# Patient Record
Sex: FEMALE | Race: WHITE | NOT HISPANIC OR LATINO | Employment: OTHER | URBAN - METROPOLITAN AREA
[De-identification: names, ages, dates, MRNs, and addresses within clinical notes are randomized per-mention and may not be internally consistent; named-entity substitution may affect disease eponyms.]

---

## 2017-05-18 ENCOUNTER — HISTORICAL (OUTPATIENT)
Dept: ADMINISTRATIVE | Facility: HOSPITAL | Age: 57
End: 2017-05-18

## 2017-05-25 ENCOUNTER — HISTORICAL (OUTPATIENT)
Dept: ADMINISTRATIVE | Facility: HOSPITAL | Age: 57
End: 2017-05-25

## 2017-05-25 LAB
CHOLEST SERPL-MCNC: 290 MG/DL (ref 0–200)
CHOLEST/HDLC SERPL: 5.9 {RATIO} (ref 0–4)
HDLC SERPL-MCNC: 49 MG/DL (ref 35–60)
LDLC SERPL CALC-MCNC: 190 MG/DL (ref 0–129)
TRIGL SERPL-MCNC: 256 MG/DL (ref 30–150)
TSH SERPL-ACNC: 6.12 MIU/ML (ref 0.36–3.74)
VLDLC SERPL CALC-MCNC: 51 MG/DL

## 2017-06-12 ENCOUNTER — HISTORICAL (OUTPATIENT)
Dept: RADIOLOGY | Facility: HOSPITAL | Age: 57
End: 2017-06-12

## 2017-06-19 ENCOUNTER — HISTORICAL (OUTPATIENT)
Dept: RADIOLOGY | Facility: HOSPITAL | Age: 57
End: 2017-06-19

## 2017-07-10 ENCOUNTER — HISTORICAL (OUTPATIENT)
Dept: ADMINISTRATIVE | Facility: HOSPITAL | Age: 57
End: 2017-07-10

## 2017-07-10 LAB — TSH SERPL-ACNC: 1.04 MIU/ML (ref 0.36–3.74)

## 2017-12-06 ENCOUNTER — HISTORICAL (OUTPATIENT)
Dept: ADMINISTRATIVE | Facility: HOSPITAL | Age: 57
End: 2017-12-06

## 2017-12-06 LAB
ABS NEUT (OLG): 1.07 X10(3)/MCL (ref 2.1–9.2)
ALBUMIN SERPL-MCNC: 4.1 GM/DL (ref 3.4–5)
ALBUMIN/GLOB SERPL: 1.5 {RATIO}
ALP SERPL-CCNC: 65 UNIT/L (ref 38–126)
ALT SERPL-CCNC: 39 UNIT/L (ref 12–78)
ANISOCYTOSIS BLD QL SMEAR: 1
APPEARANCE, UA: CLEAR
AST SERPL-CCNC: 16 UNIT/L (ref 15–37)
BACTERIA SPEC CULT: ABNORMAL /HPF
BILIRUB SERPL-MCNC: 1.1 MG/DL (ref 0.2–1)
BILIRUB UR QL STRIP: NEGATIVE
BILIRUBIN DIRECT+TOT PNL SERPL-MCNC: 0.2 MG/DL (ref 0–0.2)
BILIRUBIN DIRECT+TOT PNL SERPL-MCNC: 0.9 MG/DL (ref 0–0.8)
BUN SERPL-MCNC: 19 MG/DL (ref 7–18)
CALCIUM SERPL-MCNC: 8.8 MG/DL (ref 8.5–10.1)
CHLORIDE SERPL-SCNC: 106 MMOL/L (ref 98–107)
CHOLEST SERPL-MCNC: 201 MG/DL (ref 0–200)
CHOLEST/HDLC SERPL: 4.4 {RATIO} (ref 0–4)
CO2 SERPL-SCNC: 27 MMOL/L (ref 21–32)
COLOR UR: YELLOW
CREAT SERPL-MCNC: 0.68 MG/DL (ref 0.55–1.02)
DEPRECATED CALCIDIOL+CALCIFEROL SERPL-MC: 35.2 NG/ML (ref 30–80)
EOSINOPHIL NFR BLD MANUAL: 6 % (ref 0–8)
ERYTHROCYTE [DISTWIDTH] IN BLOOD BY AUTOMATED COUNT: 12.9 % (ref 11.5–17)
EST. AVERAGE GLUCOSE BLD GHB EST-MCNC: 105 MG/DL
GLOBULIN SER-MCNC: 2.8 GM/DL (ref 2.4–3.5)
GLUCOSE (UA): NEGATIVE
GLUCOSE SERPL-MCNC: 94 MG/DL (ref 74–106)
HBA1C MFR BLD: 5.3 % (ref 4.2–6.3)
HCT VFR BLD AUTO: 41.4 % (ref 37–47)
HDLC SERPL-MCNC: 46 MG/DL (ref 35–60)
HGB BLD-MCNC: 14.2 GM/DL (ref 12–16)
HGB UR QL STRIP: NEGATIVE
KETONES UR QL STRIP: ABNORMAL
LDLC SERPL CALC-MCNC: 136 MG/DL (ref 0–129)
LEUKOCYTE ESTERASE UR QL STRIP: ABNORMAL
LYMPHOCYTES NFR BLD MANUAL: 41 % (ref 13–40)
LYMPHOCYTES NFR BLD MANUAL: 6 %
MCH RBC QN AUTO: 32.1 PG (ref 27–31)
MCHC RBC AUTO-ENTMCNC: 34.3 GM/DL (ref 33–36)
MCV RBC AUTO: 93.7 FL (ref 80–94)
MICROCYTES BLD QL SMEAR: 1
MONOCYTES NFR BLD MANUAL: 9 % (ref 2–11)
NEUTROPHILS NFR BLD MANUAL: 38 % (ref 47–80)
NITRITE UR QL STRIP: NEGATIVE
PH UR STRIP: 6 [PH] (ref 5–9)
PLATELET # BLD AUTO: 144 X10(3)/MCL (ref 130–400)
PLATELET # BLD EST: NORMAL 10*3/UL
PMV BLD AUTO: 10.3 FL (ref 7.4–10.4)
POTASSIUM SERPL-SCNC: 3.8 MMOL/L (ref 3.5–5.1)
PROT SERPL-MCNC: 6.9 GM/DL (ref 6.4–8.2)
PROT UR QL STRIP: NEGATIVE
RBC # BLD AUTO: 4.42 X10(6)/MCL (ref 4.2–5.4)
RBC #/AREA URNS HPF: ABNORMAL /[HPF]
SODIUM SERPL-SCNC: 142 MMOL/L (ref 136–145)
SP GR UR STRIP: 1.02 (ref 1–1.03)
SQUAMOUS EPITHELIAL, UA: 6 /HPF (ref 0–4)
TRIGL SERPL-MCNC: 93 MG/DL (ref 30–150)
TSH SERPL-ACNC: 2.41 MIU/ML (ref 0.36–3.74)
UROBILINOGEN UR STRIP-ACNC: 1
VLDLC SERPL CALC-MCNC: 19 MG/DL
WBC # SPEC AUTO: 2.6 X10(3)/MCL (ref 4.5–11.5)
WBC #/AREA URNS HPF: ABNORMAL /[HPF]

## 2018-02-28 ENCOUNTER — HISTORICAL (OUTPATIENT)
Dept: ADMINISTRATIVE | Facility: HOSPITAL | Age: 58
End: 2018-02-28

## 2018-02-28 LAB
ABS NEUT (OLG): 1.66 X10(3)/MCL (ref 2.1–9.2)
ANISOCYTOSIS BLD QL SMEAR: 1
APPEARANCE, UA: ABNORMAL
BACTERIA SPEC CULT: ABNORMAL /HPF
BASOPHILS NFR BLD MANUAL: 1 % (ref 0–2)
BILIRUB UR QL STRIP: NEGATIVE
COLOR UR: YELLOW
EOSINOPHIL NFR BLD MANUAL: 1 % (ref 0–8)
ERYTHROCYTE [DISTWIDTH] IN BLOOD BY AUTOMATED COUNT: 13.4 % (ref 11.5–17)
GLUCOSE (UA): NEGATIVE
HCT VFR BLD AUTO: 39.7 % (ref 37–47)
HGB BLD-MCNC: 13.4 GM/DL (ref 12–16)
HGB UR QL STRIP: NEGATIVE
KETONES UR QL STRIP: ABNORMAL
LEUKOCYTE ESTERASE UR QL STRIP: ABNORMAL
LYMPHOCYTES NFR BLD MANUAL: 46 % (ref 13–40)
MCH RBC QN AUTO: 31.9 PG (ref 27–31)
MCHC RBC AUTO-ENTMCNC: 33.8 GM/DL (ref 33–36)
MCV RBC AUTO: 94.5 FL (ref 80–94)
MICROCYTES BLD QL SMEAR: 1
MONOCYTES NFR BLD MANUAL: 4 % (ref 2–11)
NEUTROPHILS NFR BLD MANUAL: 48 % (ref 47–80)
NITRITE UR QL STRIP: NEGATIVE
PH UR STRIP: 5.5 [PH] (ref 5–9)
PLATELET # BLD AUTO: 143 X10(3)/MCL (ref 130–400)
PLATELET # BLD EST: NORMAL 10*3/UL
PMV BLD AUTO: 10.6 FL (ref 7.4–10.4)
PROT UR QL STRIP: NEGATIVE
RBC # BLD AUTO: 4.2 X10(6)/MCL (ref 4.2–5.4)
RBC #/AREA URNS HPF: ABNORMAL /[HPF]
SP GR UR STRIP: 1.01 (ref 1–1.03)
SQUAMOUS EPITHELIAL, UA: ABNORMAL
UA WBC MAN: ABNORMAL /HPF
UROBILINOGEN UR STRIP-ACNC: 1
WBC # SPEC AUTO: 3.9 X10(3)/MCL (ref 4.5–11.5)

## 2018-03-08 ENCOUNTER — HISTORICAL (OUTPATIENT)
Dept: ADMINISTRATIVE | Facility: HOSPITAL | Age: 58
End: 2018-03-08

## 2018-03-08 LAB — VIT B12 SERPL-MCNC: 455 PG/ML (ref 193–986)

## 2018-05-30 ENCOUNTER — HISTORICAL (OUTPATIENT)
Dept: ADMINISTRATIVE | Facility: HOSPITAL | Age: 58
End: 2018-05-30

## 2018-05-30 LAB
ABS NEUT (OLG): 3.73 X10(3)/MCL (ref 2.1–9.2)
BASOPHILS # BLD AUTO: 0 X10(3)/MCL (ref 0–0.2)
BASOPHILS NFR BLD AUTO: 0 %
EOSINOPHIL # BLD AUTO: 0.1 X10(3)/MCL (ref 0–0.9)
EOSINOPHIL NFR BLD AUTO: 2 %
ERYTHROCYTE [DISTWIDTH] IN BLOOD BY AUTOMATED COUNT: 12.5 % (ref 11.5–17)
HCT VFR BLD AUTO: 38.9 % (ref 37–47)
HGB BLD-MCNC: 12.7 GM/DL (ref 12–16)
LYMPHOCYTES # BLD AUTO: 2 X10(3)/MCL (ref 0.6–4.6)
LYMPHOCYTES NFR BLD AUTO: 33 %
MCH RBC QN AUTO: 31.8 PG (ref 27–31)
MCHC RBC AUTO-ENTMCNC: 32.6 GM/DL (ref 33–36)
MCV RBC AUTO: 97.3 FL (ref 80–94)
MONOCYTES # BLD AUTO: 0.3 X10(3)/MCL (ref 0.1–1.3)
MONOCYTES NFR BLD AUTO: 5 %
NEUTROPHILS # BLD AUTO: 3.73 X10(3)/MCL (ref 2.1–9.2)
NEUTROPHILS NFR BLD AUTO: 60 %
PLATELET # BLD AUTO: 175 X10(3)/MCL (ref 130–400)
PMV BLD AUTO: 10.4 FL (ref 9.4–12.4)
RBC # BLD AUTO: 4 X10(6)/MCL (ref 4.2–5.4)
WBC # SPEC AUTO: 6.2 X10(3)/MCL (ref 4.5–11.5)

## 2018-06-06 ENCOUNTER — HISTORICAL (OUTPATIENT)
Dept: ADMINISTRATIVE | Facility: HOSPITAL | Age: 58
End: 2018-06-06

## 2018-06-06 LAB
ALBUMIN SERPL-MCNC: 3.5 GM/DL (ref 3.4–5)
ALBUMIN/GLOB SERPL: 1.2 RATIO (ref 1.1–2)
ALP SERPL-CCNC: 68 UNIT/L (ref 38–126)
ALT SERPL-CCNC: 35 UNIT/L (ref 12–78)
AST SERPL-CCNC: 18 UNIT/L (ref 15–37)
BILIRUB SERPL-MCNC: 0.6 MG/DL (ref 0.2–1)
BILIRUBIN DIRECT+TOT PNL SERPL-MCNC: 0.1 MG/DL (ref 0–0.5)
BILIRUBIN DIRECT+TOT PNL SERPL-MCNC: 0.5 MG/DL (ref 0–0.8)
BUN SERPL-MCNC: 20 MG/DL (ref 7–18)
CALCIUM SERPL-MCNC: 8.6 MG/DL (ref 8.5–10.1)
CHLORIDE SERPL-SCNC: 109 MMOL/L (ref 98–107)
CHOLEST SERPL-MCNC: 203 MG/DL (ref 0–200)
CHOLEST/HDLC SERPL: 3.9 {RATIO} (ref 0–4)
CO2 SERPL-SCNC: 28 MMOL/L (ref 21–32)
CREAT SERPL-MCNC: 0.67 MG/DL (ref 0.55–1.02)
DEPRECATED CALCIDIOL+CALCIFEROL SERPL-MC: 22 NG/ML (ref 30–80)
EST. AVERAGE GLUCOSE BLD GHB EST-MCNC: 105 MG/DL
GLOBULIN SER-MCNC: 2.8 GM/DL (ref 2.4–3.5)
GLUCOSE SERPL-MCNC: 103 MG/DL (ref 74–106)
HBA1C MFR BLD: 5.3 % (ref 4.2–6.3)
HDLC SERPL-MCNC: 52 MG/DL (ref 35–60)
LDLC SERPL CALC-MCNC: 133 MG/DL (ref 0–129)
POTASSIUM SERPL-SCNC: 4.6 MMOL/L (ref 3.5–5.1)
PROT SERPL-MCNC: 6.3 GM/DL (ref 6.4–8.2)
SODIUM SERPL-SCNC: 142 MMOL/L (ref 136–145)
TRIGL SERPL-MCNC: 88 MG/DL (ref 30–150)
TSH SERPL-ACNC: 2.28 MIU/L (ref 0.36–3.74)
VLDLC SERPL CALC-MCNC: 18 MG/DL

## 2018-12-03 ENCOUNTER — HISTORICAL (OUTPATIENT)
Dept: ADMINISTRATIVE | Facility: HOSPITAL | Age: 58
End: 2018-12-03

## 2019-01-25 ENCOUNTER — HISTORICAL (OUTPATIENT)
Dept: ADMINISTRATIVE | Facility: HOSPITAL | Age: 59
End: 2019-01-25

## 2019-01-25 LAB
ABS NEUT (OLG): 1.8 X10(3)/MCL (ref 2.1–9.2)
BASOPHILS # BLD AUTO: 0 X10(3)/MCL (ref 0–0.2)
BASOPHILS NFR BLD AUTO: 0 %
BUN SERPL-MCNC: 21 MG/DL (ref 7–18)
CALCIUM SERPL-MCNC: 9.4 MG/DL (ref 8.5–10.1)
CHLORIDE SERPL-SCNC: 108 MMOL/L (ref 98–107)
CHOLEST SERPL-MCNC: 251 MG/DL (ref 0–200)
CHOLEST/HDLC SERPL: 3.9 {RATIO} (ref 0–4)
CO2 SERPL-SCNC: 30 MMOL/L (ref 21–32)
CREAT SERPL-MCNC: 0.8 MG/DL (ref 0.55–1.02)
CREAT/UREA NIT SERPL: 26.2
EOSINOPHIL # BLD AUTO: 0.1 X10(3)/MCL (ref 0–0.9)
EOSINOPHIL NFR BLD AUTO: 2 %
ERYTHROCYTE [DISTWIDTH] IN BLOOD BY AUTOMATED COUNT: 12.8 % (ref 11.5–17)
GLUCOSE SERPL-MCNC: 106 MG/DL (ref 74–106)
HCT VFR BLD AUTO: 41.7 % (ref 37–47)
HDLC SERPL-MCNC: 64 MG/DL (ref 35–60)
HGB BLD-MCNC: 13.6 GM/DL (ref 12–16)
LDLC SERPL CALC-MCNC: 166 MG/DL (ref 0–129)
LYMPHOCYTES # BLD AUTO: 1.4 X10(3)/MCL (ref 0.6–4.6)
LYMPHOCYTES NFR BLD AUTO: 40 %
MCH RBC QN AUTO: 31.9 PG (ref 27–31)
MCHC RBC AUTO-ENTMCNC: 32.6 GM/DL (ref 33–36)
MCV RBC AUTO: 97.9 FL (ref 80–94)
MONOCYTES # BLD AUTO: 0.2 X10(3)/MCL (ref 0.1–1.3)
MONOCYTES NFR BLD AUTO: 7 %
NEUTROPHILS # BLD AUTO: 1.8 X10(3)/MCL (ref 2.1–9.2)
NEUTROPHILS NFR BLD AUTO: 50 %
PLATELET # BLD AUTO: 151 X10(3)/MCL (ref 130–400)
PMV BLD AUTO: 10.3 FL (ref 9.4–12.4)
POTASSIUM SERPL-SCNC: 4.5 MMOL/L (ref 3.5–5.1)
RBC # BLD AUTO: 4.26 X10(6)/MCL (ref 4.2–5.4)
SODIUM SERPL-SCNC: 143 MMOL/L (ref 136–145)
TRIGL SERPL-MCNC: 103 MG/DL (ref 30–150)
TSH SERPL-ACNC: 2.93 MIU/L (ref 0.36–3.74)
VLDLC SERPL CALC-MCNC: 21 MG/DL
WBC # SPEC AUTO: 3.6 X10(3)/MCL (ref 4.5–11.5)

## 2019-07-19 ENCOUNTER — HISTORICAL (OUTPATIENT)
Dept: ADMINISTRATIVE | Facility: HOSPITAL | Age: 59
End: 2019-07-19

## 2019-07-19 LAB
ABS NEUT (OLG): 1.71 X10(3)/MCL (ref 2.1–9.2)
ALBUMIN SERPL-MCNC: 3.8 GM/DL (ref 3.4–5)
ALBUMIN/GLOB SERPL: 1.3 {RATIO}
ALP SERPL-CCNC: 67 UNIT/L (ref 38–126)
ALT SERPL-CCNC: 40 UNIT/L (ref 12–78)
APPEARANCE, UA: CLEAR
AST SERPL-CCNC: 18 UNIT/L (ref 15–37)
BACTERIA SPEC CULT: ABNORMAL /HPF
BASOPHILS # BLD AUTO: 0 X10(3)/MCL (ref 0–0.2)
BASOPHILS NFR BLD AUTO: 0 %
BILIRUB SERPL-MCNC: 1 MG/DL (ref 0.2–1)
BILIRUB UR QL STRIP: NEGATIVE
BILIRUBIN DIRECT+TOT PNL SERPL-MCNC: 0.1 MG/DL (ref 0–0.2)
BILIRUBIN DIRECT+TOT PNL SERPL-MCNC: 0.9 MG/DL (ref 0–0.8)
BUN SERPL-MCNC: 16 MG/DL (ref 7–18)
CALCIUM SERPL-MCNC: 9.1 MG/DL (ref 8.5–10.1)
CHLORIDE SERPL-SCNC: 105 MMOL/L (ref 98–107)
CHOLEST SERPL-MCNC: 275 MG/DL (ref 0–200)
CHOLEST/HDLC SERPL: 5.1 {RATIO} (ref 0–4)
CO2 SERPL-SCNC: 26 MMOL/L (ref 21–32)
COLOR UR: YELLOW
CREAT SERPL-MCNC: 0.73 MG/DL (ref 0.55–1.02)
DEPRECATED CALCIDIOL+CALCIFEROL SERPL-MC: 27.09 NG/ML (ref 30–80)
EOSINOPHIL # BLD AUTO: 0.1 X10(3)/MCL (ref 0–0.9)
EOSINOPHIL NFR BLD AUTO: 3 %
ERYTHROCYTE [DISTWIDTH] IN BLOOD BY AUTOMATED COUNT: 12.5 % (ref 11.5–17)
EST. AVERAGE GLUCOSE BLD GHB EST-MCNC: 111 MG/DL
GLOBULIN SER-MCNC: 2.9 GM/DL (ref 2.4–3.5)
GLUCOSE (UA): NEGATIVE
GLUCOSE SERPL-MCNC: 105 MG/DL (ref 74–106)
HBA1C MFR BLD: 5.5 % (ref 4.2–6.3)
HCT VFR BLD AUTO: 42.4 % (ref 37–47)
HDLC SERPL-MCNC: 54 MG/DL (ref 35–60)
HGB BLD-MCNC: 13.7 GM/DL (ref 12–16)
HGB UR QL STRIP: NEGATIVE
KETONES UR QL STRIP: NEGATIVE
LDLC SERPL CALC-MCNC: 181 MG/DL (ref 0–129)
LEUKOCYTE ESTERASE UR QL STRIP: ABNORMAL
LYMPHOCYTES # BLD AUTO: 1.4 X10(3)/MCL (ref 0.6–4.6)
LYMPHOCYTES NFR BLD AUTO: 40 %
MCH RBC QN AUTO: 31 PG (ref 27–31)
MCHC RBC AUTO-ENTMCNC: 32.3 GM/DL (ref 33–36)
MCV RBC AUTO: 95.9 FL (ref 80–94)
MONOCYTES # BLD AUTO: 0.2 X10(3)/MCL (ref 0.1–1.3)
MONOCYTES NFR BLD AUTO: 7 %
MUCOUS THREADS URNS QL MICRO: SLIGHT
NEUTROPHILS # BLD AUTO: 1.71 X10(3)/MCL (ref 2.1–9.2)
NEUTROPHILS NFR BLD AUTO: 49 %
NITRITE UR QL STRIP: NEGATIVE
PH UR STRIP: 6.5 [PH] (ref 5–9)
PLATELET # BLD AUTO: 159 X10(3)/MCL (ref 130–400)
PMV BLD AUTO: 9.9 FL (ref 9.4–12.4)
POTASSIUM SERPL-SCNC: 3.6 MMOL/L (ref 3.5–5.1)
PROT SERPL-MCNC: 6.7 GM/DL (ref 6.4–8.2)
PROT UR QL STRIP: NEGATIVE
RBC # BLD AUTO: 4.42 X10(6)/MCL (ref 4.2–5.4)
RBC #/AREA URNS HPF: ABNORMAL /[HPF]
SODIUM SERPL-SCNC: 142 MMOL/L (ref 136–145)
SP GR UR STRIP: 1.02 (ref 1–1.03)
SQUAMOUS EPITHELIAL, UA: 9 /HPF (ref 0–4)
TRIGL SERPL-MCNC: 199 MG/DL (ref 30–150)
TSH SERPL-ACNC: 2.75 MIU/L (ref 0.36–3.74)
UROBILINOGEN UR STRIP-ACNC: 0.2
VLDLC SERPL CALC-MCNC: 40 MG/DL
WBC # SPEC AUTO: 3.5 X10(3)/MCL (ref 4.5–11.5)
WBC #/AREA URNS HPF: 28 /HPF (ref 0–3)

## 2019-07-21 LAB — FINAL CULTURE: NORMAL

## 2019-07-25 ENCOUNTER — HISTORICAL (OUTPATIENT)
Dept: LAB | Facility: HOSPITAL | Age: 59
End: 2019-07-25

## 2019-07-25 LAB
APPEARANCE, UA: CLEAR
BACTERIA SPEC CULT: NORMAL /HPF
BILIRUB UR QL STRIP: NEGATIVE
COLOR UR: YELLOW
GLUCOSE (UA): NEGATIVE
HGB UR QL STRIP: NEGATIVE
KETONES UR QL STRIP: NEGATIVE
LEUKOCYTE ESTERASE UR QL STRIP: NEGATIVE
NITRITE UR QL STRIP: NEGATIVE
PH UR STRIP: 7.5 [PH] (ref 5–9)
PROT UR QL STRIP: NEGATIVE
RBC #/AREA URNS HPF: NORMAL /[HPF]
SP GR UR STRIP: 1.01 (ref 1–1.03)
SQUAMOUS EPITHELIAL, UA: NORMAL
UROBILINOGEN UR STRIP-ACNC: 0.2
VIT B12 SERPL-MCNC: 454 PG/ML (ref 193–986)
WBC #/AREA URNS HPF: NORMAL /[HPF]

## 2019-08-20 LAB
INFLUENZA A ANTIGEN, POC: NEGATIVE
INFLUENZA B ANTIGEN, POC: NEGATIVE

## 2019-09-27 ENCOUNTER — HISTORICAL (OUTPATIENT)
Dept: ADMINISTRATIVE | Facility: HOSPITAL | Age: 59
End: 2019-09-27

## 2019-09-27 LAB
ABS NEUT (OLG): 1.82 X10(3)/MCL (ref 2.1–9.2)
BASOPHILS # BLD AUTO: 0 X10(3)/MCL (ref 0–0.2)
BASOPHILS NFR BLD AUTO: 1 %
EOSINOPHIL # BLD AUTO: 0.1 X10(3)/MCL (ref 0–0.9)
EOSINOPHIL NFR BLD AUTO: 2 %
ERYTHROCYTE [DISTWIDTH] IN BLOOD BY AUTOMATED COUNT: 13.1 % (ref 11.5–17)
FOLATE SERPL-MCNC: 13 NG/ML (ref 3.1–17.5)
HCT VFR BLD AUTO: 42.5 % (ref 37–47)
HGB BLD-MCNC: 14 GM/DL (ref 12–16)
LYMPHOCYTES # BLD AUTO: 1.4 X10(3)/MCL (ref 0.6–4.6)
LYMPHOCYTES NFR BLD AUTO: 38 %
MCH RBC QN AUTO: 31.7 PG (ref 27–31)
MCHC RBC AUTO-ENTMCNC: 32.9 GM/DL (ref 33–36)
MCV RBC AUTO: 96.4 FL (ref 80–94)
MONOCYTES # BLD AUTO: 0.3 X10(3)/MCL (ref 0.1–1.3)
MONOCYTES NFR BLD AUTO: 8 %
NEUTROPHILS # BLD AUTO: 1.82 X10(3)/MCL (ref 2.1–9.2)
NEUTROPHILS NFR BLD AUTO: 51 %
PLATELET # BLD AUTO: 177 X10(3)/MCL (ref 130–400)
PMV BLD AUTO: 9.8 FL (ref 9.4–12.4)
RBC # BLD AUTO: 4.41 X10(6)/MCL (ref 4.2–5.4)
WBC # SPEC AUTO: 3.6 X10(3)/MCL (ref 4.5–11.5)

## 2020-01-13 LAB
PAP RECOMMENDATION EXT: NORMAL
PAP SMEAR: NORMAL

## 2020-01-23 ENCOUNTER — HISTORICAL (OUTPATIENT)
Dept: LAB | Facility: HOSPITAL | Age: 60
End: 2020-01-23

## 2020-01-23 ENCOUNTER — HISTORICAL (OUTPATIENT)
Dept: ADMINISTRATIVE | Facility: HOSPITAL | Age: 60
End: 2020-01-23

## 2020-01-23 LAB
CHOLEST SERPL-MCNC: 273 MG/DL (ref 0–199)
CHOLEST/HDLC SERPL: 5 {RATIO}
HDLC SERPL-MCNC: 54 MG/DL
LDLC SERPL CALC-MCNC: 180 MG/DL (ref 0–129)
TRIGL SERPL-MCNC: 196 MG/DL (ref 0–149)
TSH SERPL-ACNC: 1.24 MIU/ML (ref 0.36–3.74)
VLDLC SERPL CALC-MCNC: 39 MG/DL

## 2020-02-26 ENCOUNTER — HISTORICAL (OUTPATIENT)
Dept: RADIOLOGY | Facility: HOSPITAL | Age: 60
End: 2020-02-26

## 2020-05-07 ENCOUNTER — HISTORICAL (OUTPATIENT)
Dept: ADMINISTRATIVE | Facility: HOSPITAL | Age: 60
End: 2020-05-07

## 2020-05-27 ENCOUNTER — HISTORICAL (OUTPATIENT)
Dept: LAB | Facility: HOSPITAL | Age: 60
End: 2020-05-27

## 2020-05-27 LAB
BUN SERPL-MCNC: 22.6 MG/DL (ref 7–18)
CALCIUM SERPL-MCNC: 9.4 MG/DL (ref 8.5–10.1)
CHLORIDE SERPL-SCNC: 106 MMOL/L (ref 98–107)
CHOLEST SERPL-MCNC: 276 MG/DL (ref 0–200)
CHOLEST/HDLC SERPL: 5.6 {RATIO} (ref 0–4)
CO2 SERPL-SCNC: 29.5 MMOL/L (ref 21–32)
CREAT SERPL-MCNC: 0.84 MG/DL (ref 0.6–1.3)
CREAT/UREA NIT SERPL: 27
GLUCOSE SERPL-MCNC: 111 MG/DL (ref 74–106)
HDLC SERPL-MCNC: 49 MG/DL (ref 40–60)
LDLC SERPL CALC-MCNC: 186 MG/DL (ref 0–129)
POTASSIUM SERPL-SCNC: 4.5 MMOL/L (ref 3.5–5.1)
SODIUM SERPL-SCNC: 143 MMOL/L (ref 136–145)
TRIGL SERPL-MCNC: 203 MG/DL
VLDLC SERPL CALC-MCNC: 41 MG/DL

## 2020-06-18 ENCOUNTER — HISTORICAL (OUTPATIENT)
Dept: ADMINISTRATIVE | Facility: HOSPITAL | Age: 60
End: 2020-06-18

## 2020-07-10 ENCOUNTER — HISTORICAL (OUTPATIENT)
Dept: LAB | Facility: HOSPITAL | Age: 60
End: 2020-07-10

## 2020-07-10 LAB
ALBUMIN SERPL-MCNC: 4.4 GM/DL (ref 3.4–5)
ALP SERPL-CCNC: 78 UNIT/L (ref 40–150)
ALT SERPL-CCNC: 28 UNIT/L (ref 0–55)
AST SERPL-CCNC: 22 UNIT/L (ref 5–34)
BILIRUB SERPL-MCNC: 1 MG/DL
BILIRUBIN DIRECT+TOT PNL SERPL-MCNC: 0.2 MG/DL (ref 0–0.5)
BILIRUBIN DIRECT+TOT PNL SERPL-MCNC: 0.8 MG/DL (ref 0–0.8)
CHOLEST SERPL-MCNC: 244 MG/DL (ref 0–200)
CHOLEST/HDLC SERPL: 4.9 {RATIO} (ref 0–4)
HDLC SERPL-MCNC: 50 MG/DL (ref 40–60)
LDLC SERPL CALC-MCNC: 159 MG/DL (ref 0–129)
LIVER PROFILE INTERP: NORMAL
PROT SERPL-MCNC: 7.3 GM/DL (ref 5.8–7.6)
TRIGL SERPL-MCNC: 174 MG/DL
VLDLC SERPL CALC-MCNC: 35 MG/DL

## 2020-09-08 ENCOUNTER — HISTORICAL (OUTPATIENT)
Dept: RADIOLOGY | Facility: HOSPITAL | Age: 60
End: 2020-09-08

## 2020-11-12 ENCOUNTER — HISTORICAL (OUTPATIENT)
Dept: ADMINISTRATIVE | Facility: HOSPITAL | Age: 60
End: 2020-11-12

## 2020-11-12 LAB
ABS NEUT (OLG): 1.79 X10(3)/MCL (ref 2.1–9.2)
ALBUMIN SERPL-MCNC: 4 GM/DL (ref 3.4–4.8)
ALBUMIN/GLOB SERPL: 1.6 RATIO (ref 1.1–2)
ALP SERPL-CCNC: 76 UNIT/L (ref 40–150)
ALT SERPL-CCNC: 21 UNIT/L (ref 0–55)
APPEARANCE, UA: ABNORMAL
AST SERPL-CCNC: 17 UNIT/L (ref 5–34)
BACTERIA SPEC CULT: ABNORMAL /HPF
BASOPHILS # BLD AUTO: 0 X10(3)/MCL (ref 0–0.2)
BASOPHILS NFR BLD AUTO: 1 %
BILIRUB SERPL-MCNC: 0.8 MG/DL
BILIRUB UR QL STRIP: NEGATIVE
BILIRUBIN DIRECT+TOT PNL SERPL-MCNC: 0.3 MG/DL (ref 0–0.5)
BILIRUBIN DIRECT+TOT PNL SERPL-MCNC: 0.5 MG/DL (ref 0–0.8)
BUN SERPL-MCNC: 15.6 MG/DL (ref 9.8–20.1)
CALCIUM SERPL-MCNC: 8.8 MG/DL (ref 8.4–10.2)
CHLORIDE SERPL-SCNC: 107 MMOL/L (ref 98–107)
CHOLEST SERPL-MCNC: 207 MG/DL
CHOLEST/HDLC SERPL: 5 {RATIO} (ref 0–5)
CO2 SERPL-SCNC: 27 MMOL/L (ref 23–31)
COLOR UR: YELLOW
CREAT SERPL-MCNC: 0.79 MG/DL (ref 0.55–1.02)
DEPRECATED CALCIDIOL+CALCIFEROL SERPL-MC: 32.4 NG/ML (ref 30–80)
EOSINOPHIL # BLD AUTO: 0.1 X10(3)/MCL (ref 0–0.9)
EOSINOPHIL NFR BLD AUTO: 3 %
ERYTHROCYTE [DISTWIDTH] IN BLOOD BY AUTOMATED COUNT: 12.7 % (ref 11.5–17)
EST. AVERAGE GLUCOSE BLD GHB EST-MCNC: 108.3 MG/DL
GLOBULIN SER-MCNC: 2.5 GM/DL (ref 2.4–3.5)
GLUCOSE (UA): NEGATIVE
GLUCOSE SERPL-MCNC: 106 MG/DL (ref 82–115)
HBA1C MFR BLD: 5.4 %
HCT VFR BLD AUTO: 42.4 % (ref 37–47)
HDLC SERPL-MCNC: 40 MG/DL (ref 35–60)
HGB BLD-MCNC: 14.2 GM/DL (ref 12–16)
HGB UR QL STRIP: NEGATIVE
KETONES UR QL STRIP: NEGATIVE
LDLC SERPL CALC-MCNC: 131 MG/DL (ref 50–140)
LEUKOCYTE ESTERASE UR QL STRIP: NEGATIVE
LYMPHOCYTES # BLD AUTO: 1.4 X10(3)/MCL (ref 0.6–4.6)
LYMPHOCYTES NFR BLD AUTO: 39 %
MCH RBC QN AUTO: 31.9 PG (ref 27–31)
MCHC RBC AUTO-ENTMCNC: 33.5 GM/DL (ref 33–36)
MCV RBC AUTO: 95.3 FL (ref 80–94)
MONOCYTES # BLD AUTO: 0.2 X10(3)/MCL (ref 0.1–1.3)
MONOCYTES NFR BLD AUTO: 6 %
NEUTROPHILS # BLD AUTO: 1.79 X10(3)/MCL (ref 2.1–9.2)
NEUTROPHILS NFR BLD AUTO: 50 %
NITRITE UR QL STRIP: NEGATIVE
PH UR STRIP: 5.5 [PH] (ref 5–9)
PLATELET # BLD AUTO: 160 X10(3)/MCL (ref 130–400)
PMV BLD AUTO: 10.3 FL (ref 9.4–12.4)
POTASSIUM SERPL-SCNC: 4.2 MMOL/L (ref 3.5–5.1)
PROT SERPL-MCNC: 6.5 GM/DL (ref 5.8–7.6)
PROT UR QL STRIP: NEGATIVE
RBC # BLD AUTO: 4.45 X10(6)/MCL (ref 4.2–5.4)
RBC #/AREA URNS HPF: ABNORMAL /[HPF]
SODIUM SERPL-SCNC: 143 MMOL/L (ref 136–145)
SP GR UR STRIP: 1.02 (ref 1–1.03)
SQUAMOUS EPITHELIAL, UA: 16 /HPF (ref 0–4)
TRIGL SERPL-MCNC: 182 MG/DL (ref 37–140)
TSH SERPL-ACNC: 1.66 UIU/ML (ref 0.35–4.94)
UROBILINOGEN UR STRIP-ACNC: 0.2
VLDLC SERPL CALC-MCNC: 36 MG/DL
WBC # SPEC AUTO: 3.6 X10(3)/MCL (ref 4.5–11.5)
WBC #/AREA URNS HPF: ABNORMAL /[HPF]

## 2020-12-01 ENCOUNTER — HISTORICAL (OUTPATIENT)
Dept: ADMINISTRATIVE | Facility: HOSPITAL | Age: 60
End: 2020-12-01

## 2020-12-01 LAB
CRP SERPL HS-MCNC: 0.2 MG/DL
ERYTHROCYTE [SEDIMENTATION RATE] IN BLOOD: 16 MM/HR (ref 0–20)

## 2021-02-22 ENCOUNTER — HISTORICAL (OUTPATIENT)
Dept: RADIOLOGY | Facility: HOSPITAL | Age: 61
End: 2021-02-22

## 2021-04-14 ENCOUNTER — HISTORICAL (OUTPATIENT)
Dept: ADMINISTRATIVE | Facility: HOSPITAL | Age: 61
End: 2021-04-14

## 2021-04-14 LAB
ALBUMIN SERPL-MCNC: 4.1 GM/DL (ref 3.4–4.8)
ALBUMIN/GLOB SERPL: 1.7 RATIO (ref 1.1–2)
ALP SERPL-CCNC: 71 UNIT/L (ref 40–150)
ALT SERPL-CCNC: 32 UNIT/L (ref 0–55)
AST SERPL-CCNC: 20 UNIT/L (ref 5–34)
BILIRUB SERPL-MCNC: 0.8 MG/DL
BILIRUBIN DIRECT+TOT PNL SERPL-MCNC: 0.3 MG/DL (ref 0–0.5)
BILIRUBIN DIRECT+TOT PNL SERPL-MCNC: 0.5 MG/DL (ref 0–0.8)
BUN SERPL-MCNC: 13.3 MG/DL (ref 9.8–20.1)
CALCIUM SERPL-MCNC: 9.4 MG/DL (ref 8.4–10.2)
CHLORIDE SERPL-SCNC: 107 MMOL/L (ref 98–107)
CO2 SERPL-SCNC: 27 MMOL/L (ref 23–31)
CREAT SERPL-MCNC: 0.73 MG/DL (ref 0.55–1.02)
ERYTHROCYTE [DISTWIDTH] IN BLOOD BY AUTOMATED COUNT: 13 % (ref 11.5–17)
GLOBULIN SER-MCNC: 2.4 GM/DL (ref 2.4–3.5)
GLUCOSE SERPL-MCNC: 116 MG/DL (ref 82–115)
HCT VFR BLD AUTO: 42.6 % (ref 37–47)
HGB BLD-MCNC: 14.2 GM/DL (ref 12–16)
MCH RBC QN AUTO: 32.8 PG (ref 27–31)
MCHC RBC AUTO-ENTMCNC: 33.3 GM/DL (ref 33–36)
MCV RBC AUTO: 98.4 FL (ref 80–94)
PLATELET # BLD AUTO: 178 X10(3)/MCL (ref 130–400)
PMV BLD AUTO: 9.9 FL (ref 9.4–12.4)
POTASSIUM SERPL-SCNC: 4.1 MMOL/L (ref 3.5–5.1)
PROT SERPL-MCNC: 6.5 GM/DL (ref 5.8–7.6)
RBC # BLD AUTO: 4.33 X10(6)/MCL (ref 4.2–5.4)
SODIUM SERPL-SCNC: 143 MMOL/L (ref 136–145)
WBC # SPEC AUTO: 3 X10(3)/MCL (ref 4.5–11.5)

## 2021-04-30 ENCOUNTER — HISTORICAL (OUTPATIENT)
Dept: RADIOLOGY | Facility: HOSPITAL | Age: 61
End: 2021-04-30

## 2021-05-14 ENCOUNTER — HISTORICAL (OUTPATIENT)
Dept: RADIATION THERAPY | Facility: HOSPITAL | Age: 61
End: 2021-05-14

## 2021-06-02 ENCOUNTER — HISTORICAL (OUTPATIENT)
Dept: ADMINISTRATIVE | Facility: HOSPITAL | Age: 61
End: 2021-06-02

## 2021-06-02 LAB
ABS NEUT (OLG): 1.78 X10(3)/MCL (ref 2.1–9.2)
ALBUMIN SERPL-MCNC: 4.4 GM/DL (ref 3.4–4.8)
ALBUMIN/GLOB SERPL: 1.9 RATIO (ref 1.1–2)
ALP SERPL-CCNC: 64 UNIT/L (ref 40–150)
ALT SERPL-CCNC: 21 UNIT/L (ref 0–55)
AST SERPL-CCNC: 19 UNIT/L (ref 5–34)
BASOPHILS # BLD AUTO: 0 X10(3)/MCL (ref 0–0.2)
BASOPHILS NFR BLD AUTO: 0.2 %
BILIRUB SERPL-MCNC: 0.9 MG/DL
BILIRUBIN DIRECT+TOT PNL SERPL-MCNC: 0.4 MG/DL (ref 0–0.5)
BILIRUBIN DIRECT+TOT PNL SERPL-MCNC: 0.5 MG/DL (ref 0–0.8)
BUN SERPL-MCNC: 15.4 MG/DL (ref 9.8–20.1)
CALCIUM SERPL-MCNC: 9.7 MG/DL (ref 8.4–10.2)
CHLORIDE SERPL-SCNC: 105 MMOL/L (ref 98–107)
CO2 SERPL-SCNC: 28 MMOL/L (ref 23–31)
CREAT SERPL-MCNC: 0.78 MG/DL (ref 0.55–1.02)
DEPRECATED CALCIDIOL+CALCIFEROL SERPL-MC: 35.3 NG/ML (ref 30–80)
EOSINOPHIL # BLD AUTO: 0.1 X10(3)/MCL (ref 0–0.9)
EOSINOPHIL NFR BLD AUTO: 2 %
ERYTHROCYTE [DISTWIDTH] IN BLOOD BY AUTOMATED COUNT: 13 % (ref 11.5–17)
GLOBULIN SER-MCNC: 2.3 GM/DL (ref 2.4–3.5)
GLUCOSE SERPL-MCNC: 103 MG/DL (ref 82–115)
HCT VFR BLD AUTO: 40.9 % (ref 37–47)
HGB BLD-MCNC: 13.9 GM/DL (ref 12–16)
LYMPHOCYTES # BLD AUTO: 1.9 X10(3)/MCL (ref 0.6–4.6)
LYMPHOCYTES NFR BLD AUTO: 46.1 %
MCH RBC QN AUTO: 32.2 PG (ref 27–31)
MCHC RBC AUTO-ENTMCNC: 34 GM/DL (ref 33–36)
MCV RBC AUTO: 94.7 FL (ref 80–94)
MONOCYTES # BLD AUTO: 0.3 X10(3)/MCL (ref 0.1–1.3)
MONOCYTES NFR BLD AUTO: 7.8 %
NEUTROPHILS # BLD AUTO: 1.8 X10(3)/MCL (ref 2.1–9.2)
NEUTROPHILS NFR BLD AUTO: 43.7 %
PLATELET # BLD AUTO: 144 X10(3)/MCL (ref 130–400)
PMV BLD AUTO: 9.5 FL (ref 9.4–12.4)
POTASSIUM SERPL-SCNC: 4.3 MMOL/L (ref 3.5–5.1)
PROT SERPL-MCNC: 6.7 GM/DL (ref 5.8–7.6)
RBC # BLD AUTO: 4.32 X10(6)/MCL (ref 4.2–5.4)
SODIUM SERPL-SCNC: 142 MMOL/L (ref 136–145)
WBC # SPEC AUTO: 4.1 X10(3)/MCL (ref 4.5–11.5)

## 2021-06-16 ENCOUNTER — HISTORICAL (OUTPATIENT)
Dept: RADIOLOGY | Facility: HOSPITAL | Age: 61
End: 2021-06-16

## 2021-06-16 LAB — BMD RECOMMENDATION EXT: NORMAL

## 2021-06-21 ENCOUNTER — HISTORICAL (OUTPATIENT)
Dept: RADIATION THERAPY | Facility: HOSPITAL | Age: 61
End: 2021-06-21

## 2021-06-25 ENCOUNTER — HISTORICAL (OUTPATIENT)
Dept: RADIATION THERAPY | Facility: HOSPITAL | Age: 61
End: 2021-06-25

## 2021-06-28 ENCOUNTER — HISTORICAL (OUTPATIENT)
Dept: RADIATION THERAPY | Facility: HOSPITAL | Age: 61
End: 2021-06-28

## 2021-06-29 ENCOUNTER — HISTORICAL (OUTPATIENT)
Dept: RADIATION THERAPY | Facility: HOSPITAL | Age: 61
End: 2021-06-29

## 2021-06-30 ENCOUNTER — HISTORICAL (OUTPATIENT)
Dept: RADIATION THERAPY | Facility: HOSPITAL | Age: 61
End: 2021-06-30

## 2021-07-01 ENCOUNTER — HISTORICAL (OUTPATIENT)
Dept: RADIATION THERAPY | Facility: HOSPITAL | Age: 61
End: 2021-07-01

## 2021-07-02 ENCOUNTER — HISTORICAL (OUTPATIENT)
Dept: RADIATION THERAPY | Facility: HOSPITAL | Age: 61
End: 2021-07-02

## 2021-07-06 ENCOUNTER — HISTORICAL (OUTPATIENT)
Dept: RADIATION THERAPY | Facility: HOSPITAL | Age: 61
End: 2021-07-06

## 2021-07-07 ENCOUNTER — HISTORICAL (OUTPATIENT)
Dept: RADIATION THERAPY | Facility: HOSPITAL | Age: 61
End: 2021-07-07

## 2021-07-08 ENCOUNTER — HISTORICAL (OUTPATIENT)
Dept: RADIATION THERAPY | Facility: HOSPITAL | Age: 61
End: 2021-07-08

## 2021-07-09 ENCOUNTER — HISTORICAL (OUTPATIENT)
Dept: RADIATION THERAPY | Facility: HOSPITAL | Age: 61
End: 2021-07-09

## 2021-07-09 ENCOUNTER — HISTORICAL (OUTPATIENT)
Dept: HEMATOLOGY/ONCOLOGY | Facility: CLINIC | Age: 61
End: 2021-07-09

## 2021-07-09 LAB
ABS NEUT (OLG): 1.46 X10(3)/MCL (ref 2.1–9.2)
ALBUMIN SERPL-MCNC: 3.8 GM/DL (ref 3.4–4.8)
ALBUMIN/GLOB SERPL: 1.4 RATIO (ref 1.1–2)
ALP SERPL-CCNC: 70 UNIT/L (ref 40–150)
ALT SERPL-CCNC: 36 UNIT/L (ref 0–55)
AST SERPL-CCNC: 27 UNIT/L (ref 5–34)
BASOPHILS # BLD AUTO: 0 X10(3)/MCL (ref 0–0.2)
BASOPHILS NFR BLD AUTO: 0.3 %
BILIRUB SERPL-MCNC: 0.9 MG/DL
BILIRUBIN DIRECT+TOT PNL SERPL-MCNC: 0.3 MG/DL (ref 0–0.5)
BILIRUBIN DIRECT+TOT PNL SERPL-MCNC: 0.6 MG/DL (ref 0–0.8)
BUN SERPL-MCNC: 15.8 MG/DL (ref 9.8–20.1)
CALCIUM SERPL-MCNC: 9.3 MG/DL (ref 8.4–10.2)
CHLORIDE SERPL-SCNC: 107 MMOL/L (ref 98–107)
CO2 SERPL-SCNC: 25 MMOL/L (ref 23–31)
CREAT SERPL-MCNC: 0.78 MG/DL (ref 0.55–1.02)
EOSINOPHIL # BLD AUTO: 0.1 X10(3)/MCL (ref 0–0.9)
EOSINOPHIL NFR BLD AUTO: 3.5 %
ERYTHROCYTE [DISTWIDTH] IN BLOOD BY AUTOMATED COUNT: 13 % (ref 11.5–17)
GLOBULIN SER-MCNC: 2.8 GM/DL (ref 2.4–3.5)
GLUCOSE SERPL-MCNC: 119 MG/DL (ref 82–115)
HCT VFR BLD AUTO: 41.3 % (ref 37–47)
HGB BLD-MCNC: 13.9 GM/DL (ref 12–16)
LYMPHOCYTES # BLD AUTO: 1.1 X10(3)/MCL (ref 0.6–4.6)
LYMPHOCYTES NFR BLD AUTO: 37 %
MCH RBC QN AUTO: 31.7 PG (ref 27–31)
MCHC RBC AUTO-ENTMCNC: 33.7 GM/DL (ref 33–36)
MCV RBC AUTO: 94.3 FL (ref 80–94)
MONOCYTES # BLD AUTO: 0.2 X10(3)/MCL (ref 0.1–1.3)
MONOCYTES NFR BLD AUTO: 8.3 %
NEUTROPHILS # BLD AUTO: 1.5 X10(3)/MCL (ref 2.1–9.2)
NEUTROPHILS NFR BLD AUTO: 50.6 %
PLATELET # BLD AUTO: 139 X10(3)/MCL (ref 130–400)
PMV BLD AUTO: 9.6 FL (ref 9.4–12.4)
POTASSIUM SERPL-SCNC: 4.5 MMOL/L (ref 3.5–5.1)
PROT SERPL-MCNC: 6.6 GM/DL (ref 5.8–7.6)
RBC # BLD AUTO: 4.38 X10(6)/MCL (ref 4.2–5.4)
SODIUM SERPL-SCNC: 144 MMOL/L (ref 136–145)
WBC # SPEC AUTO: 2.9 X10(3)/MCL (ref 4.5–11.5)

## 2021-07-12 ENCOUNTER — HISTORICAL (OUTPATIENT)
Dept: RADIATION THERAPY | Facility: HOSPITAL | Age: 61
End: 2021-07-12

## 2021-07-13 ENCOUNTER — HISTORICAL (OUTPATIENT)
Dept: RADIATION THERAPY | Facility: HOSPITAL | Age: 61
End: 2021-07-13

## 2021-07-14 ENCOUNTER — HISTORICAL (OUTPATIENT)
Dept: RADIATION THERAPY | Facility: HOSPITAL | Age: 61
End: 2021-07-14

## 2021-07-15 ENCOUNTER — HISTORICAL (OUTPATIENT)
Dept: RADIATION THERAPY | Facility: HOSPITAL | Age: 61
End: 2021-07-15

## 2021-07-16 ENCOUNTER — HISTORICAL (OUTPATIENT)
Dept: RADIATION THERAPY | Facility: HOSPITAL | Age: 61
End: 2021-07-16

## 2021-07-19 ENCOUNTER — HISTORICAL (OUTPATIENT)
Dept: RADIATION THERAPY | Facility: HOSPITAL | Age: 61
End: 2021-07-19

## 2021-07-20 ENCOUNTER — HISTORICAL (OUTPATIENT)
Dept: RADIATION THERAPY | Facility: HOSPITAL | Age: 61
End: 2021-07-20

## 2021-07-21 ENCOUNTER — HISTORICAL (OUTPATIENT)
Dept: RADIATION THERAPY | Facility: HOSPITAL | Age: 61
End: 2021-07-21

## 2021-07-22 ENCOUNTER — HISTORICAL (OUTPATIENT)
Dept: RADIATION THERAPY | Facility: HOSPITAL | Age: 61
End: 2021-07-22

## 2021-07-23 ENCOUNTER — HISTORICAL (OUTPATIENT)
Dept: HEMATOLOGY/ONCOLOGY | Facility: CLINIC | Age: 61
End: 2021-07-23

## 2021-07-23 ENCOUNTER — HISTORICAL (OUTPATIENT)
Dept: RADIATION THERAPY | Facility: HOSPITAL | Age: 61
End: 2021-07-23

## 2021-07-26 ENCOUNTER — HISTORICAL (OUTPATIENT)
Dept: RADIATION THERAPY | Facility: HOSPITAL | Age: 61
End: 2021-07-26

## 2021-10-05 ENCOUNTER — HISTORICAL (OUTPATIENT)
Dept: HEMATOLOGY/ONCOLOGY | Facility: CLINIC | Age: 61
End: 2021-10-05

## 2021-10-05 LAB
ABS NEUT (OLG): 2.08 X10(3)/MCL (ref 2.1–9.2)
ALBUMIN SERPL-MCNC: 4.1 GM/DL (ref 3.4–4.8)
ALBUMIN/GLOB SERPL: 1.7 RATIO (ref 1.1–2)
ALP SERPL-CCNC: 65 UNIT/L (ref 40–150)
ALT SERPL-CCNC: 26 UNIT/L (ref 0–55)
AST SERPL-CCNC: 20 UNIT/L (ref 5–34)
BASOPHILS # BLD AUTO: 0 X10(3)/MCL (ref 0–0.2)
BASOPHILS NFR BLD AUTO: 0.3 %
BILIRUB SERPL-MCNC: 0.8 MG/DL
BILIRUBIN DIRECT+TOT PNL SERPL-MCNC: 0.3 MG/DL (ref 0–0.5)
BILIRUBIN DIRECT+TOT PNL SERPL-MCNC: 0.5 MG/DL (ref 0–0.8)
BUN SERPL-MCNC: 17.7 MG/DL (ref 9.8–20.1)
CALCIUM SERPL-MCNC: 9.5 MG/DL (ref 8.4–10.2)
CHLORIDE SERPL-SCNC: 107 MMOL/L (ref 98–107)
CO2 SERPL-SCNC: 26 MMOL/L (ref 23–31)
CREAT SERPL-MCNC: 0.79 MG/DL (ref 0.55–1.02)
DEPRECATED CALCIDIOL+CALCIFEROL SERPL-MC: 44.5 NG/ML (ref 30–80)
EOSINOPHIL # BLD AUTO: 0.1 X10(3)/MCL (ref 0–0.9)
EOSINOPHIL NFR BLD AUTO: 3.4 %
ERYTHROCYTE [DISTWIDTH] IN BLOOD BY AUTOMATED COUNT: 12.8 % (ref 11.5–17)
FOLATE SERPL-MCNC: 9.6 NG/ML (ref 7–31.4)
GLOBULIN SER-MCNC: 2.4 GM/DL (ref 2.4–3.5)
GLUCOSE SERPL-MCNC: 109 MG/DL (ref 82–115)
HCT VFR BLD AUTO: 40.8 % (ref 37–47)
HGB BLD-MCNC: 13.5 GM/DL (ref 12–16)
LYMPHOCYTES # BLD AUTO: 1.4 X10(3)/MCL (ref 0.6–4.6)
LYMPHOCYTES NFR BLD AUTO: 35.8 %
MCH RBC QN AUTO: 32.1 PG (ref 27–31)
MCHC RBC AUTO-ENTMCNC: 33.1 GM/DL (ref 33–36)
MCV RBC AUTO: 96.9 FL (ref 80–94)
MONOCYTES # BLD AUTO: 0.3 X10(3)/MCL (ref 0.1–1.3)
MONOCYTES NFR BLD AUTO: 7 %
NEUTROPHILS # BLD AUTO: 2.1 X10(3)/MCL (ref 2.1–9.2)
NEUTROPHILS NFR BLD AUTO: 53.5 %
PLATELET # BLD AUTO: 136 X10(3)/MCL (ref 130–400)
PMV BLD AUTO: 9.7 FL (ref 9.4–12.4)
POTASSIUM SERPL-SCNC: 4 MMOL/L (ref 3.5–5.1)
PROT SERPL-MCNC: 6.5 GM/DL (ref 5.8–7.6)
RBC # BLD AUTO: 4.21 X10(6)/MCL (ref 4.2–5.4)
SODIUM SERPL-SCNC: 143 MMOL/L (ref 136–145)
VIT B12 SERPL-MCNC: 372 PG/ML (ref 213–816)
WBC # SPEC AUTO: 3.9 X10(3)/MCL (ref 4.5–11.5)

## 2021-10-26 ENCOUNTER — HISTORICAL (OUTPATIENT)
Dept: RADIATION THERAPY | Facility: HOSPITAL | Age: 61
End: 2021-10-26

## 2021-11-13 ENCOUNTER — HISTORICAL (OUTPATIENT)
Dept: LAB | Facility: HOSPITAL | Age: 61
End: 2021-11-13

## 2021-11-13 LAB
ABS NEUT (OLG): 1.51 X10(3)/MCL (ref 2.1–9.2)
ALBUMIN SERPL-MCNC: 4.1 GM/DL (ref 3.4–4.8)
ALBUMIN/GLOB SERPL: 1.5 RATIO (ref 1.1–2)
ALP SERPL-CCNC: 67 UNIT/L (ref 40–150)
ALT SERPL-CCNC: 25 UNIT/L (ref 0–55)
APPEARANCE, UA: CLEAR
AST SERPL-CCNC: 20 UNIT/L (ref 5–34)
BACTERIA SPEC CULT: ABNORMAL
BILIRUB SERPL-MCNC: 1 MG/DL
BILIRUB UR QL STRIP: NEGATIVE
BILIRUBIN DIRECT+TOT PNL SERPL-MCNC: 0.3 MG/DL (ref 0–0.5)
BILIRUBIN DIRECT+TOT PNL SERPL-MCNC: 0.7 MG/DL (ref 0–0.8)
BUN SERPL-MCNC: 14.8 MG/DL (ref 9.8–20.1)
CALCIUM SERPL-MCNC: 9.6 MG/DL (ref 8.7–10.5)
CHLORIDE SERPL-SCNC: 107 MMOL/L (ref 98–107)
CHOLEST SERPL-MCNC: 143 MG/DL
CHOLEST/HDLC SERPL: 3 {RATIO} (ref 0–5)
CO2 SERPL-SCNC: 27 MMOL/L (ref 23–31)
COLOR UR: YELLOW
CREAT SERPL-MCNC: 0.74 MG/DL (ref 0.55–1.02)
DEPRECATED CALCIDIOL+CALCIFEROL SERPL-MC: 52.5 NG/ML (ref 30–80)
EOSINOPHIL NFR BLD MANUAL: 2 % (ref 0–8)
ERYTHROCYTE [DISTWIDTH] IN BLOOD BY AUTOMATED COUNT: 12.6 % (ref 11.5–17)
EST. AVERAGE GLUCOSE BLD GHB EST-MCNC: 105.4 MG/DL
GLOBULIN SER-MCNC: 2.8 GM/DL (ref 2.4–3.5)
GLUCOSE (UA): NEGATIVE
GLUCOSE SERPL-MCNC: 108 MG/DL (ref 82–115)
HBA1C MFR BLD: 5.3 %
HCT VFR BLD AUTO: 40.7 % (ref 37–47)
HDLC SERPL-MCNC: 43 MG/DL (ref 35–60)
HGB BLD-MCNC: 13.8 GM/DL (ref 12–16)
HGB UR QL STRIP: ABNORMAL
KETONES UR QL STRIP: NEGATIVE
LDLC SERPL CALC-MCNC: 77 MG/DL (ref 50–140)
LEUKOCYTE ESTERASE UR QL STRIP: ABNORMAL
LYMPHOCYTES NFR BLD MANUAL: 41 % (ref 13–40)
MCH RBC QN AUTO: 31.9 PG (ref 27–31)
MCHC RBC AUTO-ENTMCNC: 33.9 GM/DL (ref 33–36)
MCV RBC AUTO: 94 FL (ref 80–94)
MONOCYTES NFR BLD MANUAL: 7 % (ref 2–11)
NEUTROPHILS NFR BLD MANUAL: 49 % (ref 47–80)
NEUTS BAND NFR BLD MANUAL: 1 % (ref 0–11)
NITRITE UR QL STRIP: NEGATIVE
PH UR STRIP: 7.5 [PH] (ref 5–9)
PLATELET # BLD AUTO: 148 X10(3)/MCL (ref 130–400)
PLATELET # BLD EST: ADEQUATE 10*3/UL
PMV BLD AUTO: 10 FL (ref 9.4–12.4)
POTASSIUM SERPL-SCNC: 4 MMOL/L (ref 3.5–5.1)
PROT SERPL-MCNC: 6.9 GM/DL (ref 5.8–7.6)
PROT UR QL STRIP: NEGATIVE
RBC # BLD AUTO: 4.33 X10(6)/MCL (ref 4.2–5.4)
RBC #/AREA URNS HPF: ABNORMAL /[HPF]
RBC MORPH BLD: NORMAL
SODIUM SERPL-SCNC: 142 MMOL/L (ref 136–145)
SP GR UR STRIP: 1.02 (ref 1–1.03)
SQUAMOUS EPITHELIAL, UA: ABNORMAL
TRIGL SERPL-MCNC: 113 MG/DL (ref 37–140)
TSH SERPL-ACNC: 0.67 UIU/ML (ref 0.35–4.94)
UROBILINOGEN UR STRIP-ACNC: 1
VLDLC SERPL CALC-MCNC: 23 MG/DL
WBC # SPEC AUTO: 2.9 X10(3)/MCL (ref 4.5–11.5)
WBC #/AREA URNS HPF: ABNORMAL /HPF

## 2021-11-22 LAB — CRC RECOMMENDATION EXT: NORMAL

## 2022-01-20 ENCOUNTER — HISTORICAL (OUTPATIENT)
Dept: ADMINISTRATIVE | Facility: HOSPITAL | Age: 62
End: 2022-01-20

## 2022-01-20 LAB
(HCYS)2 SERPL-MCNC: 7.26 UMOL/L (ref 5.08–15.39)
ABS NEUT (OLG): 1.92 X10(3)/MCL (ref 2.1–9.2)
BASOPHILS # BLD AUTO: 0 X10(3)/MCL (ref 0–0.2)
BASOPHILS NFR BLD AUTO: 0.3 %
EOSINOPHIL # BLD AUTO: 0.1 X10(3)/MCL (ref 0–0.9)
EOSINOPHIL NFR BLD AUTO: 2 %
ERYTHROCYTE [DISTWIDTH] IN BLOOD BY AUTOMATED COUNT: 12.7 % (ref 11.5–17)
HCT VFR BLD AUTO: 40.6 % (ref 37–47)
HGB BLD-MCNC: 13.7 GM/DL (ref 12–16)
LYMPHOCYTES # BLD AUTO: 1.3 X10(3)/MCL (ref 0.6–4.6)
LYMPHOCYTES NFR BLD AUTO: 36.6 %
MCH RBC QN AUTO: 32.7 PG (ref 27–31)
MCHC RBC AUTO-ENTMCNC: 33.7 GM/DL (ref 33–36)
MCV RBC AUTO: 96.9 FL (ref 80–94)
MONOCYTES # BLD AUTO: 0.2 X10(3)/MCL (ref 0.1–1.3)
MONOCYTES NFR BLD AUTO: 6.8 %
NEUTROPHILS # BLD AUTO: 1.9 X10(3)/MCL (ref 2.1–9.2)
NEUTROPHILS NFR BLD AUTO: 54 %
PLATELET # BLD AUTO: 162 X10(3)/MCL (ref 130–400)
PMV BLD AUTO: 9.9 FL (ref 9.4–12.4)
RBC # BLD AUTO: 4.19 X10(6)/MCL (ref 4.2–5.4)
VIT B12 SERPL-MCNC: 424 PG/ML (ref 213–816)
WBC # SPEC AUTO: 3.6 X10(3)/MCL (ref 4.5–11.5)

## 2022-02-23 ENCOUNTER — HISTORICAL (OUTPATIENT)
Dept: ADMINISTRATIVE | Facility: HOSPITAL | Age: 62
End: 2022-02-23

## 2022-02-24 LAB — SARS-COV-2 RNA RESP QL NAA+PROBE: NOT DETECTED

## 2022-04-11 ENCOUNTER — HISTORICAL (OUTPATIENT)
Dept: ADMINISTRATIVE | Facility: HOSPITAL | Age: 62
End: 2022-04-11
Payer: COMMERCIAL

## 2022-04-27 VITALS
OXYGEN SATURATION: 98 % | HEIGHT: 66 IN | SYSTOLIC BLOOD PRESSURE: 112 MMHG | DIASTOLIC BLOOD PRESSURE: 76 MMHG | BODY MASS INDEX: 32.59 KG/M2 | WEIGHT: 202.81 LBS

## 2022-05-11 ENCOUNTER — TELEPHONE (OUTPATIENT)
Dept: INTERNAL MEDICINE | Facility: CLINIC | Age: 62
End: 2022-05-11
Payer: COMMERCIAL

## 2022-05-11 NOTE — TELEPHONE ENCOUNTER
----- Message from Juan Daniel Freire MA sent at 5/11/2022  8:23 AM CDT -----  Regarding: PV 5/18/22 @ 11:20 Dr. Casiano  1. Are there any outstanding tasks in the patient's chart? Yes, fasting labs    2. Is there any documentation in the chart? No    3.Has patient been seen in an ER, Urgent care clinic, or been admitted since last visit?  If yes, When, where, and why    4. Has patient seen any other healthcare providers since last visit?  If yes, when, where, and why    5. Has patient had any bloodwork or XR done since last visit?    6. Is patient signed up for patient portal?

## 2022-05-12 NOTE — TELEPHONE ENCOUNTER
----- Message from Dena Washington sent at 5/12/2022  8:58 AM CDT -----  Regarding: Blood work  Patient is having blood work for Dr. Landis and wants to know if this will suffice for her apt next Wednesday. Call back is [phone]

## 2022-05-12 NOTE — TELEPHONE ENCOUNTER
Spoke with patient and advised labs from Dr. Casiano and Dr. Orozco are gonna be two different sets of labs being ordered. Patient verbalized understanding

## 2022-05-13 DIAGNOSIS — E03.9 HYPOTHYROIDISM, UNSPECIFIED TYPE: ICD-10-CM

## 2022-05-13 DIAGNOSIS — E78.5 HYPERLIPIDEMIA, UNSPECIFIED HYPERLIPIDEMIA TYPE: Primary | ICD-10-CM

## 2022-05-14 ENCOUNTER — LAB VISIT (OUTPATIENT)
Dept: LAB | Facility: HOSPITAL | Age: 62
End: 2022-05-14
Attending: INTERNAL MEDICINE
Payer: COMMERCIAL

## 2022-05-14 DIAGNOSIS — E78.5 HYPERLIPIDEMIA, UNSPECIFIED HYPERLIPIDEMIA TYPE: ICD-10-CM

## 2022-05-14 DIAGNOSIS — E03.9 HYPOTHYROIDISM, UNSPECIFIED TYPE: ICD-10-CM

## 2022-05-14 LAB
ALBUMIN SERPL-MCNC: 3.9 GM/DL (ref 3.4–4.8)
ALP SERPL-CCNC: 79 UNIT/L (ref 40–150)
ALT SERPL-CCNC: 32 UNIT/L (ref 0–55)
AST SERPL-CCNC: 30 UNIT/L (ref 5–34)
BILIRUBIN DIRECT+TOT PNL SERPL-MCNC: 0.3 MG/DL (ref 0–0.5)
BILIRUBIN DIRECT+TOT PNL SERPL-MCNC: 0.4 MG/DL (ref 0–0.8)
BILIRUBIN DIRECT+TOT PNL SERPL-MCNC: 0.7 MG/DL
CHOLEST SERPL-MCNC: 163 MG/DL
CHOLEST/HDLC SERPL: 4 {RATIO} (ref 0–5)
HDLC SERPL-MCNC: 46 MG/DL (ref 35–60)
LDLC SERPL CALC-MCNC: 87 MG/DL (ref 50–140)
PROT SERPL-MCNC: 7.2 GM/DL (ref 5.8–7.6)
T4 FREE SERPL-MCNC: 0.92 NG/DL (ref 0.7–1.48)
TRIGL SERPL-MCNC: 150 MG/DL (ref 37–140)
TSH SERPL-ACNC: 1.62 UIU/ML (ref 0.35–4.94)
VLDLC SERPL CALC-MCNC: 30 MG/DL

## 2022-05-14 PROCEDURE — 36415 COLL VENOUS BLD VENIPUNCTURE: CPT

## 2022-05-14 PROCEDURE — 80061 LIPID PANEL: CPT

## 2022-05-14 PROCEDURE — 80076 HEPATIC FUNCTION PANEL: CPT

## 2022-05-14 PROCEDURE — 84443 ASSAY THYROID STIM HORMONE: CPT

## 2022-05-14 PROCEDURE — 84439 ASSAY OF FREE THYROXINE: CPT

## 2022-05-17 ENCOUNTER — PATIENT MESSAGE (OUTPATIENT)
Dept: ORTHOPEDICS | Facility: CLINIC | Age: 62
End: 2022-05-17
Payer: COMMERCIAL

## 2022-05-18 ENCOUNTER — OFFICE VISIT (OUTPATIENT)
Dept: INTERNAL MEDICINE | Facility: CLINIC | Age: 62
End: 2022-05-18
Payer: COMMERCIAL

## 2022-05-18 VITALS
HEART RATE: 75 BPM | TEMPERATURE: 97 F | HEIGHT: 64 IN | OXYGEN SATURATION: 98 % | SYSTOLIC BLOOD PRESSURE: 134 MMHG | RESPIRATION RATE: 14 BRPM | WEIGHT: 211 LBS | DIASTOLIC BLOOD PRESSURE: 86 MMHG | BODY MASS INDEX: 36.02 KG/M2

## 2022-05-18 DIAGNOSIS — Z71.3 WEIGHT LOSS COUNSELING, ENCOUNTER FOR: Primary | ICD-10-CM

## 2022-05-18 DIAGNOSIS — E66.01 MORBID (SEVERE) OBESITY DUE TO EXCESS CALORIES: ICD-10-CM

## 2022-05-18 DIAGNOSIS — C50.919 MALIGNANT NEOPLASM OF FEMALE BREAST, UNSPECIFIED ESTROGEN RECEPTOR STATUS, UNSPECIFIED LATERALITY, UNSPECIFIED SITE OF BREAST: ICD-10-CM

## 2022-05-18 DIAGNOSIS — K59.00 CONSTIPATION, UNSPECIFIED CONSTIPATION TYPE: ICD-10-CM

## 2022-05-18 DIAGNOSIS — E03.9 HYPOTHYROIDISM, UNSPECIFIED TYPE: ICD-10-CM

## 2022-05-18 PROCEDURE — 1159F MED LIST DOCD IN RCRD: CPT | Mod: CPTII,,, | Performed by: INTERNAL MEDICINE

## 2022-05-18 PROCEDURE — 3079F DIAST BP 80-89 MM HG: CPT | Mod: CPTII,,, | Performed by: INTERNAL MEDICINE

## 2022-05-18 PROCEDURE — 3075F PR MOST RECENT SYSTOLIC BLOOD PRESS GE 130-139MM HG: ICD-10-PCS | Mod: CPTII,,, | Performed by: INTERNAL MEDICINE

## 2022-05-18 PROCEDURE — 1159F PR MEDICATION LIST DOCUMENTED IN MEDICAL RECORD: ICD-10-PCS | Mod: CPTII,,, | Performed by: INTERNAL MEDICINE

## 2022-05-18 PROCEDURE — 3075F SYST BP GE 130 - 139MM HG: CPT | Mod: CPTII,,, | Performed by: INTERNAL MEDICINE

## 2022-05-18 PROCEDURE — 3008F BODY MASS INDEX DOCD: CPT | Mod: CPTII,,, | Performed by: INTERNAL MEDICINE

## 2022-05-18 PROCEDURE — 3079F PR MOST RECENT DIASTOLIC BLOOD PRESSURE 80-89 MM HG: ICD-10-PCS | Mod: CPTII,,, | Performed by: INTERNAL MEDICINE

## 2022-05-18 PROCEDURE — 99214 OFFICE O/P EST MOD 30 MIN: CPT | Mod: ,,, | Performed by: INTERNAL MEDICINE

## 2022-05-18 PROCEDURE — 3008F PR BODY MASS INDEX (BMI) DOCUMENTED: ICD-10-PCS | Mod: CPTII,,, | Performed by: INTERNAL MEDICINE

## 2022-05-18 PROCEDURE — 99214 PR OFFICE/OUTPT VISIT, EST, LEVL IV, 30-39 MIN: ICD-10-PCS | Mod: ,,, | Performed by: INTERNAL MEDICINE

## 2022-05-18 RX ORDER — LEVOTHYROXINE SODIUM 88 UG/1
88 TABLET ORAL
COMMUNITY
Start: 2021-09-08 | End: 2022-06-23 | Stop reason: SDUPTHER

## 2022-05-18 RX ORDER — PHENTERMINE AND TOPIRAMATE 3.75; 23 MG/1; MG/1
1 CAPSULE, EXTENDED RELEASE ORAL DAILY
Qty: 14 CAPSULE | Refills: 0 | Status: SHIPPED | OUTPATIENT
Start: 2022-05-18 | End: 2022-06-01

## 2022-05-18 RX ORDER — HYDROCORTISONE 25 MG/G
CREAM TOPICAL
COMMUNITY
Start: 2021-06-02

## 2022-05-18 RX ORDER — IBUPROFEN AND FAMOTIDINE 26.6; 8 MG/1; MG/1
TABLET ORAL
COMMUNITY
Start: 2021-11-24 | End: 2023-08-03

## 2022-05-18 RX ORDER — EPINEPHRINE 0.22MG
100 AEROSOL WITH ADAPTER (ML) INHALATION
COMMUNITY

## 2022-05-18 RX ORDER — LETROZOLE 2.5 MG/1
2.5 TABLET, FILM COATED ORAL
COMMUNITY
Start: 2022-01-20 | End: 2023-01-09

## 2022-05-18 RX ORDER — LACTULOSE 10 G/15ML
10 SOLUTION ORAL; RECTAL
COMMUNITY
Start: 2021-11-18 | End: 2023-01-26

## 2022-05-18 RX ORDER — ROSUVASTATIN CALCIUM 20 MG/1
20 TABLET, COATED ORAL
COMMUNITY
Start: 2021-06-02

## 2022-05-18 RX ORDER — NAPROXEN SODIUM 220 MG/1
1 TABLET ORAL DAILY
COMMUNITY
Start: 2021-10-14 | End: 2023-01-26

## 2022-05-18 RX ORDER — AZELASTINE 1 MG/ML
SPRAY, METERED NASAL
COMMUNITY
Start: 2022-04-29

## 2022-05-18 RX ORDER — PHENTERMINE AND TOPIRAMATE 7.5; 46 MG/1; MG/1
1 CAPSULE, EXTENDED RELEASE ORAL DAILY
Qty: 30 CAPSULE | Refills: 0 | Status: SHIPPED | OUTPATIENT
Start: 2022-05-18 | End: 2022-06-17

## 2022-05-18 RX ORDER — FLUTICASONE PROPIONATE 50 MCG
1 SPRAY, SUSPENSION (ML) NASAL
COMMUNITY
End: 2022-07-25

## 2022-05-18 NOTE — PROGRESS NOTES
Subjective:      Patient ID: Letha Campbell is a 62 y.o. female.    Chief Complaint: Hyperlipidemia (6 month f/u) and Thyroid Problem (6 month f/u)      HPI:    61-year-old obese  female here for 6 month revisit  She had left knee surgery 2020 with Dr. Frank Marks, recent right knee pain initiated MRI of macerated and extruded meniscus as well as stage IV chondromalacia goes back to see him on Wednesday  Bear; last scope was in 11/2021 with multiple polys  [facility]; Stage IA infiltrating ductal carcinoma of the left breast ; on Femara  Weight down 20lbs  COVID vaccinated; needs flu vaccine  Right knee buckling she was seen by her orthopedist who recommends knee replacement on the right.  I would like to see her 20-25 lb lighter before that happens we discussed weight loss management today and she is open to pharmaceuticals.  Remainder of chronic medical conditions appear to be stable.        Problem List Items Addressed This Visit        Oncology    Malignant neoplasm of female breast       Endocrine    Morbid (severe) obesity due to excess calories    Weight loss counseling, encounter for - Primary    Hypothyroidism       GI    Constipation              Past Medical History:  Past Medical History:   Diagnosis Date    Carcinoma of upper-inner quadrant of left female breast     GERD (gastroesophageal reflux disease)     High cholesterol     Hypothyroidism, unspecified     Obesity, unspecified      Past Surgical History:   Procedure Laterality Date    BREAST BIOPSY  03/23/2021    COLONOSCOPY  07/01/2019     Review of patient's allergies indicates:  No Known Allergies  Current Outpatient Medications on File Prior to Visit   Medication Sig Dispense Refill    azelastine (ASTELIN) 137 mcg (0.1 %) nasal spray       calcium carbonate/vitamin D3 (CALCIUM WITH VITAMIN D ORAL)   0 Refill(s)      coenzyme Q10 100 mg capsule Take 100 mg by mouth.      hydrocortisone 2.5 % cream Apply  topically.      ibuprofen-famotidine (DUEXIS) 800-26.6 mg Tab Take by mouth.      letrozole (FEMARA) 2.5 mg Tab Take 2.5 mg by mouth.      levothyroxine (SYNTHROID) 88 MCG tablet Take 88 mcg by mouth.      multivitamin-minerals-lutein Tab Take 1 tablet by mouth Daily.      omega 3-dha-epa-fish oil 1,200 (144-216) mg Cap Take 1 capsule by mouth Daily.      rosuvastatin (CRESTOR) 20 MG tablet Take 20 mg by mouth.      fluticasone propionate (FLONASE) 50 mcg/actuation nasal spray 1 spray by Nasal route.      lactulose (CHRONULAC) 10 gram/15 mL solution Take 10 g by mouth.       No current facility-administered medications on file prior to visit.     Social History     Socioeconomic History    Marital status:    Tobacco Use    Smoking status: Never Smoker    Smokeless tobacco: Never Used   Substance and Sexual Activity    Alcohol use: Yes     Comment: Occassionally    Drug use: Never    Sexual activity: Not Currently     Family History   Problem Relation Age of Onset    Hyperlipidemia Mother     Arthritis Mother     Hypertension Father     Diabetes Father     Arthritis Father     Prostate cancer Father            Review of Systems  Constitutional: No fever,  no fatigue, no chills, no night sweats, + weight gain, no weight loss, no changes in appetite.   Eye: No redness, no acute vision loss, no blurred vision, no double vision, no eye pain  ENMT: No sore throat, no nasal drainage, no nose bleeds,  no headache, no ear pain, no ear drainage, no acute hearing loss  Respiratory: No cough, no sputum production, no shortness of breath, no hemoptysis, no wheezing.  Cardiovascular: No chest pain, no chest tightness, no MEDRANO, no PND, no orthopnea, no swelling, no palpitations.  Gastrointestinal: No abdominal pain, no nausea, no vomiting, no diarrhea, no constipation, no difficulty swallowing, no change in bowel habits, no rectal bleeding  Genitourinary: no urgency, no frequency, no burning or pain when  "urinating, no blood in urine, no incontinence  Heme/Lymph: No easy bruising and/or bleeding, no swollen or painful glands.  Endocrine: No polyuria, no polydipsia, no polyphagia, no heat or cold intolerance.  Musculoskeletal: No muscle pain, no muscle weakness, no joint pain, no red or swollen joints.  Integumentary: No rash, no pruritis, no hair or nail changes.  Neurologic: No dizziness, no fainting, no tremors, no tingling and/ or numbness.  Psychiatric: No anxiety, no depression, no memory loss  All Other ROS: Negative with exception of what is documented in the history of present illness     Objective:   /86 (BP Location: Left arm, Patient Position: Sitting, BP Method: Medium (Manual))   Pulse 75   Temp 97.3 °F (36.3 °C) (Temporal)   Resp 14   Ht 5' 4" (1.626 m)   Wt 95.7 kg (210 lb 15.7 oz)   SpO2 98%   BMI 36.21 kg/m²     Physical Exam  General : Alert and oriented, No acute distress, well, developed, well nourished, afebrile , Obese  Eye : PERRLA. EOMI. Normal conjunctiva without injection. Sclerae are nonicteric. No pallor.  HEENT : Normocephalic. Neck supple. Normal hearing. Oral mucosa is moist.  Neurologic : Alert and oriented. No focal deficits.   Psychiatric : Cooperative, Appropriate mood & affect. Normal judgment.          Assessment:     1. Weight loss counseling, encounter for    2. Malignant neoplasm of female breast, unspecified estrogen receptor status, unspecified laterality, unspecified site of breast    3. Constipation, unspecified constipation type    4. Hypothyroidism, unspecified type    5. Morbid (severe) obesity due to excess calories          Plan:       I am having GABRIELA Campbell start on linaCLOtide, QSYMIA, and QSYMIA. I am also having her maintain her calcium carbonate/vitamin D3 (CALCIUM WITH VITAMIN D ORAL), omega 3-dha-epa-fish oil, multivitamin-minerals-lutein, azelastine, fluticasone propionate, hydrocortisone, ibuprofen-famotidine, lactulose, letrozole, " levothyroxine, rosuvastatin, and coenzyme Q10.      Problem List Items Addressed This Visit        Oncology    Malignant neoplasm of female breast       Endocrine    Morbid (severe) obesity due to excess calories    Weight loss counseling, encounter for - Primary    Hypothyroidism       GI    Constipation            Letha was seen today for hyperlipidemia and thyroid problem.    Diagnoses and all orders for this visit:    Weight loss counseling, encounter for    Malignant neoplasm of female breast, unspecified estrogen receptor status, unspecified laterality, unspecified site of breast    Constipation, unspecified constipation type    Hypothyroidism, unspecified type    Morbid (severe) obesity due to excess calories    Other orders  -     linaCLOtide (LINZESS) 145 mcg Cap capsule; Take 1 capsule (145 mcg total) by mouth before breakfast. PRN constipation  -     phentermine-topiramate (QSYMIA) 3.75-23 mg CM24; Take 1 capsule by mouth once daily at 6am. for 14 days  -     phentermine-topiramate (QSYMIA) 7.5-46 mg CM24; Take 1 capsule by mouth once daily at 6am.            Medications Ordered This Encounter   Medications    linaCLOtide (LINZESS) 145 mcg Cap capsule     Sig: Take 1 capsule (145 mcg total) by mouth before breakfast. PRN constipation     Dispense:  90 capsule     Refill:  4    phentermine-topiramate (QSYMIA) 3.75-23 mg CM24     Sig: Take 1 capsule by mouth once daily at 6am. for 14 days     Dispense:  14 capsule     Refill:  0    phentermine-topiramate (QSYMIA) 7.5-46 mg CM24     Sig: Take 1 capsule by mouth once daily at 6am.     Dispense:  30 capsule     Refill:  0     [unfilled]  No orders of the defined types were placed in this encounter.      Medication List with Changes/Refills   New Medications    LINACLOTIDE (LINZESS) 145 MCG CAP CAPSULE    Take 1 capsule (145 mcg total) by mouth before breakfast. PRN constipation    PHENTERMINE-TOPIRAMATE (QSYMIA) 3.75-23 MG CM24    Take 1 capsule by mouth  once daily at 6am. for 14 days    PHENTERMINE-TOPIRAMATE (QSYMIA) 7.5-46 MG CM24    Take 1 capsule by mouth once daily at 6am.   Current Medications    AZELASTINE (ASTELIN) 137 MCG (0.1 %) NASAL SPRAY        CALCIUM CARBONATE/VITAMIN D3 (CALCIUM WITH VITAMIN D ORAL)      0 Refill(s)    COENZYME Q10 100 MG CAPSULE    Take 100 mg by mouth.    FLUTICASONE PROPIONATE (FLONASE) 50 MCG/ACTUATION NASAL SPRAY    1 spray by Nasal route.    HYDROCORTISONE 2.5 % CREAM    Apply topically.    IBUPROFEN-FAMOTIDINE (DUEXIS) 800-26.6 MG TAB    Take by mouth.    LACTULOSE (CHRONULAC) 10 GRAM/15 ML SOLUTION    Take 10 g by mouth.    LETROZOLE (FEMARA) 2.5 MG TAB    Take 2.5 mg by mouth.    LEVOTHYROXINE (SYNTHROID) 88 MCG TABLET    Take 88 mcg by mouth.    MULTIVITAMIN-MINERALS-LUTEIN TAB    Take 1 tablet by mouth Daily.    OMEGA 3-DHA-EPA-FISH OIL 1,200 (144-216) MG CAP    Take 1 capsule by mouth Daily.    ROSUVASTATIN (CRESTOR) 20 MG TABLET    Take 20 mg by mouth.      Medication List with Changes/Refills   New Medications    LINACLOTIDE (LINZESS) 145 MCG CAP CAPSULE    Take 1 capsule (145 mcg total) by mouth before breakfast. PRN constipation       Start Date: 5/18/2022 End Date: --    PHENTERMINE-TOPIRAMATE (QSYMIA) 3.75-23 MG CM24    Take 1 capsule by mouth once daily at 6am. for 14 days       Start Date: 5/18/2022 End Date: 6/1/2022    PHENTERMINE-TOPIRAMATE (QSYMIA) 7.5-46 MG CM24    Take 1 capsule by mouth once daily at 6am.       Start Date: 5/18/2022 End Date: 6/17/2022   Current Medications    AZELASTINE (ASTELIN) 137 MCG (0.1 %) NASAL SPRAY           Start Date: 4/29/2022 End Date: --    CALCIUM CARBONATE/VITAMIN D3 (CALCIUM WITH VITAMIN D ORAL)      0 Refill(s)       Start Date: 10/14/2021End Date: --    COENZYME Q10 100 MG CAPSULE    Take 100 mg by mouth.       Start Date: --        End Date: --    FLUTICASONE PROPIONATE (FLONASE) 50 MCG/ACTUATION NASAL SPRAY    1 spray by Nasal route.       Start Date: --        End  Date: --    HYDROCORTISONE 2.5 % CREAM    Apply topically.       Start Date: 6/2/2021  End Date: --    IBUPROFEN-FAMOTIDINE (DUEXIS) 800-26.6 MG TAB    Take by mouth.       Start Date: 11/24/2021End Date: --    LACTULOSE (CHRONULAC) 10 GRAM/15 ML SOLUTION    Take 10 g by mouth.       Start Date: 11/18/2021End Date: --    LETROZOLE (FEMARA) 2.5 MG TAB    Take 2.5 mg by mouth.       Start Date: 1/20/2022 End Date: --    LEVOTHYROXINE (SYNTHROID) 88 MCG TABLET    Take 88 mcg by mouth.       Start Date: 9/8/2021  End Date: --    MULTIVITAMIN-MINERALS-LUTEIN TAB    Take 1 tablet by mouth Daily.       Start Date: 10/14/2021End Date: --    OMEGA 3-DHA-EPA-FISH OIL 1,200 (144-216) MG CAP    Take 1 capsule by mouth Daily.       Start Date: 10/14/2021End Date: --    ROSUVASTATIN (CRESTOR) 20 MG TABLET    Take 20 mg by mouth.       Start Date: 6/2/2021  End Date: --        A trial of Qsymia, Rx sent to pharmacy, I will see her back in 3 months for revisit.    Follow up in about 3 months (around 8/18/2022).

## 2022-06-22 ENCOUNTER — PATIENT MESSAGE (OUTPATIENT)
Dept: INTERNAL MEDICINE | Facility: CLINIC | Age: 62
End: 2022-06-22
Payer: COMMERCIAL

## 2022-06-23 ENCOUNTER — TELEPHONE (OUTPATIENT)
Dept: INTERNAL MEDICINE | Facility: CLINIC | Age: 62
End: 2022-06-23
Payer: COMMERCIAL

## 2022-06-23 DIAGNOSIS — E05.90 HYPERTHYROIDISM: Primary | ICD-10-CM

## 2022-06-23 RX ORDER — LEVOTHYROXINE SODIUM 88 UG/1
88 TABLET ORAL
Qty: 90 TABLET | Refills: 1 | Status: SHIPPED | OUTPATIENT
Start: 2022-06-23 | End: 2022-12-22 | Stop reason: SDUPTHER

## 2022-06-23 NOTE — TELEPHONE ENCOUNTER
----- Message from Gagan Holder sent at 6/23/2022  1:03 PM CDT -----  Levothyroxine 88 mcg ( super 1 maría brown )

## 2022-07-08 DIAGNOSIS — C50.212 CARCINOMA OF UPPER-INNER QUADRANT OF LEFT BREAST IN FEMALE, ESTROGEN RECEPTOR POSITIVE: Primary | ICD-10-CM

## 2022-07-08 DIAGNOSIS — Z17.0 CARCINOMA OF UPPER-INNER QUADRANT OF LEFT BREAST IN FEMALE, ESTROGEN RECEPTOR POSITIVE: Primary | ICD-10-CM

## 2022-07-10 ENCOUNTER — PATIENT MESSAGE (OUTPATIENT)
Dept: INTERNAL MEDICINE | Facility: CLINIC | Age: 62
End: 2022-07-10
Payer: COMMERCIAL

## 2022-07-11 ENCOUNTER — PATIENT MESSAGE (OUTPATIENT)
Dept: INTERNAL MEDICINE | Facility: CLINIC | Age: 62
End: 2022-07-11
Payer: COMMERCIAL

## 2022-07-11 RX ORDER — PHENTERMINE AND TOPIRAMATE 11.25; 69 MG/1; MG/1
1 CAPSULE, EXTENDED RELEASE ORAL DAILY
Qty: 30 CAPSULE | Refills: 0 | Status: SHIPPED | OUTPATIENT
Start: 2022-07-11 | End: 2022-08-10

## 2022-07-15 ENCOUNTER — LAB VISIT (OUTPATIENT)
Dept: LAB | Facility: HOSPITAL | Age: 62
End: 2022-07-15
Attending: INTERNAL MEDICINE
Payer: COMMERCIAL

## 2022-07-15 DIAGNOSIS — C50.212 CARCINOMA OF UPPER-INNER QUADRANT OF LEFT BREAST IN FEMALE, ESTROGEN RECEPTOR POSITIVE: ICD-10-CM

## 2022-07-15 DIAGNOSIS — Z17.0 CARCINOMA OF UPPER-INNER QUADRANT OF LEFT BREAST IN FEMALE, ESTROGEN RECEPTOR POSITIVE: ICD-10-CM

## 2022-07-15 PROBLEM — M85.80 OSTEOPENIA: Status: ACTIVE | Noted: 2022-07-15

## 2022-07-15 PROBLEM — E53.8 LOW SERUM VITAMIN B12: Status: ACTIVE | Noted: 2022-07-15

## 2022-07-15 LAB
ALBUMIN SERPL-MCNC: 4.2 GM/DL (ref 3.4–4.8)
ALBUMIN/GLOB SERPL: 1.5 RATIO (ref 1.1–2)
ALP SERPL-CCNC: 73 UNIT/L (ref 40–150)
ALT SERPL-CCNC: 22 UNIT/L (ref 0–55)
AST SERPL-CCNC: 19 UNIT/L (ref 5–34)
BASOPHILS # BLD AUTO: 0.02 X10(3)/MCL (ref 0–0.2)
BASOPHILS NFR BLD AUTO: 0.4 %
BILIRUBIN DIRECT+TOT PNL SERPL-MCNC: 0.8 MG/DL
BUN SERPL-MCNC: 17.2 MG/DL (ref 9.8–20.1)
CALCIUM SERPL-MCNC: 9.5 MG/DL (ref 8.4–10.2)
CHLORIDE SERPL-SCNC: 109 MMOL/L (ref 98–107)
CO2 SERPL-SCNC: 25 MMOL/L (ref 23–31)
CREAT SERPL-MCNC: 0.75 MG/DL (ref 0.55–1.02)
EOSINOPHIL # BLD AUTO: 0.07 X10(3)/MCL (ref 0–0.9)
EOSINOPHIL NFR BLD AUTO: 1.5 %
ERYTHROCYTE [DISTWIDTH] IN BLOOD BY AUTOMATED COUNT: 13 % (ref 11.5–17)
GLOBULIN SER-MCNC: 2.8 GM/DL (ref 2.4–3.5)
GLUCOSE SERPL-MCNC: 86 MG/DL (ref 82–115)
HCT VFR BLD AUTO: 39.8 % (ref 37–47)
HGB BLD-MCNC: 13.5 GM/DL (ref 12–16)
IMM GRANULOCYTES # BLD AUTO: 0 X10(3)/MCL (ref 0–0.04)
IMM GRANULOCYTES NFR BLD AUTO: 0 %
LYMPHOCYTES # BLD AUTO: 1.57 X10(3)/MCL (ref 0.6–4.6)
LYMPHOCYTES NFR BLD AUTO: 34.6 %
MCH RBC QN AUTO: 32.1 PG (ref 27–31)
MCHC RBC AUTO-ENTMCNC: 33.9 MG/DL (ref 33–36)
MCV RBC AUTO: 94.8 FL (ref 80–94)
MONOCYTES # BLD AUTO: 0.33 X10(3)/MCL (ref 0.1–1.3)
MONOCYTES NFR BLD AUTO: 7.3 %
NEUTROPHILS # BLD AUTO: 2.6 X10(3)/MCL (ref 2.1–9.2)
NEUTROPHILS NFR BLD AUTO: 56.2 %
PLATELET # BLD AUTO: 140 X10(3)/MCL (ref 130–400)
PMV BLD AUTO: 9.4 FL (ref 7.4–10.4)
POTASSIUM SERPL-SCNC: 4.5 MMOL/L (ref 3.5–5.1)
PROT SERPL-MCNC: 7 GM/DL (ref 5.8–7.6)
RBC # BLD AUTO: 4.2 X10(6)/MCL (ref 4.2–5.4)
SODIUM SERPL-SCNC: 141 MMOL/L (ref 136–145)
WBC # SPEC AUTO: 4.5 X10(3)/MCL (ref 4.5–11.5)

## 2022-07-15 PROCEDURE — 80053 COMPREHEN METABOLIC PANEL: CPT

## 2022-07-15 PROCEDURE — 36415 COLL VENOUS BLD VENIPUNCTURE: CPT

## 2022-07-15 PROCEDURE — 85025 COMPLETE CBC W/AUTO DIFF WBC: CPT

## 2022-07-15 NOTE — ASSESSMENT & PLAN NOTE
· Continue Femara x 5 years  Repeat B/L Mg due in 4/2023- Ordered by PCP  OK for Knee Replacement Surgery if needed--hold Femara for 4-6 weeks if surgery

## 2022-07-25 ENCOUNTER — OFFICE VISIT (OUTPATIENT)
Dept: HEMATOLOGY/ONCOLOGY | Facility: CLINIC | Age: 62
End: 2022-07-25
Payer: COMMERCIAL

## 2022-07-25 VITALS
OXYGEN SATURATION: 96 % | WEIGHT: 208.13 LBS | SYSTOLIC BLOOD PRESSURE: 137 MMHG | DIASTOLIC BLOOD PRESSURE: 90 MMHG | TEMPERATURE: 98 F | HEART RATE: 87 BPM | HEIGHT: 64 IN | BODY MASS INDEX: 35.53 KG/M2

## 2022-07-25 DIAGNOSIS — E53.8 LOW SERUM VITAMIN B12: ICD-10-CM

## 2022-07-25 DIAGNOSIS — C50.912 MALIGNANT NEOPLASM OF LEFT BREAST IN FEMALE, ESTROGEN RECEPTOR POSITIVE, UNSPECIFIED SITE OF BREAST: ICD-10-CM

## 2022-07-25 DIAGNOSIS — Z17.0 MALIGNANT NEOPLASM OF LEFT BREAST IN FEMALE, ESTROGEN RECEPTOR POSITIVE, UNSPECIFIED SITE OF BREAST: ICD-10-CM

## 2022-07-25 DIAGNOSIS — M85.80 OSTEOPENIA, UNSPECIFIED LOCATION: ICD-10-CM

## 2022-07-25 PROCEDURE — 3008F BODY MASS INDEX DOCD: CPT | Mod: CPTII,S$GLB,, | Performed by: NURSE PRACTITIONER

## 2022-07-25 PROCEDURE — 3075F SYST BP GE 130 - 139MM HG: CPT | Mod: CPTII,S$GLB,, | Performed by: NURSE PRACTITIONER

## 2022-07-25 PROCEDURE — 3080F PR MOST RECENT DIASTOLIC BLOOD PRESSURE >= 90 MM HG: ICD-10-PCS | Mod: CPTII,S$GLB,, | Performed by: NURSE PRACTITIONER

## 2022-07-25 PROCEDURE — 3075F PR MOST RECENT SYSTOLIC BLOOD PRESS GE 130-139MM HG: ICD-10-PCS | Mod: CPTII,S$GLB,, | Performed by: NURSE PRACTITIONER

## 2022-07-25 PROCEDURE — 3080F DIAST BP >= 90 MM HG: CPT | Mod: CPTII,S$GLB,, | Performed by: NURSE PRACTITIONER

## 2022-07-25 PROCEDURE — 1159F MED LIST DOCD IN RCRD: CPT | Mod: CPTII,S$GLB,, | Performed by: NURSE PRACTITIONER

## 2022-07-25 PROCEDURE — 99999 PR PBB SHADOW E&M-EST. PATIENT-LVL IV: ICD-10-PCS | Mod: PBBFAC,,, | Performed by: NURSE PRACTITIONER

## 2022-07-25 PROCEDURE — 1160F PR REVIEW ALL MEDS BY PRESCRIBER/CLIN PHARMACIST DOCUMENTED: ICD-10-PCS | Mod: CPTII,S$GLB,, | Performed by: NURSE PRACTITIONER

## 2022-07-25 PROCEDURE — 99213 OFFICE O/P EST LOW 20 MIN: CPT | Mod: S$GLB,,, | Performed by: NURSE PRACTITIONER

## 2022-07-25 PROCEDURE — 1159F PR MEDICATION LIST DOCUMENTED IN MEDICAL RECORD: ICD-10-PCS | Mod: CPTII,S$GLB,, | Performed by: NURSE PRACTITIONER

## 2022-07-25 PROCEDURE — 99213 PR OFFICE/OUTPT VISIT, EST, LEVL III, 20-29 MIN: ICD-10-PCS | Mod: S$GLB,,, | Performed by: NURSE PRACTITIONER

## 2022-07-25 PROCEDURE — 3008F PR BODY MASS INDEX (BMI) DOCUMENTED: ICD-10-PCS | Mod: CPTII,S$GLB,, | Performed by: NURSE PRACTITIONER

## 2022-07-25 PROCEDURE — 1160F RVW MEDS BY RX/DR IN RCRD: CPT | Mod: CPTII,S$GLB,, | Performed by: NURSE PRACTITIONER

## 2022-07-25 PROCEDURE — 99999 PR PBB SHADOW E&M-EST. PATIENT-LVL IV: CPT | Mod: PBBFAC,,, | Performed by: NURSE PRACTITIONER

## 2022-07-25 NOTE — PROGRESS NOTES
Heme/Onc Progress Note    PATIENT: Letha Campbell  MRN: [id number]  DATE: 7/25/2022  Chief Complaint: Follow-up (6 month follow up)    Current Treatment: Femara    Oncology History   Malignant neoplasm of female breast   3/23/2021 Cancer Staged    Staging form: Breast, AJCC 8th Edition  - Pathologic stage from 3/23/2021: Stage IA (pT1a, pN0, cM0, G2, ER+, MT+, HER2-)     5/6/2021 Surgery    Left breast lumpectomy sentinel lymph node biopsy  Pathology: Well differentiated tumor grade 1, tumor size 5 mm, all margins negative, DCIS, deep margin reresection negative, 1 sentinel lymph node negative for malignancy, pathological staging T1 a N0 M0  Dr. Chairez      - 7/2021 Radiation Therapy    Completed XRT     6/2/2021 -  Hormone Therapy    Femara         07/25/2022  6m follow up. On femara. Her last mammogram was in 4/2022 of Bl breast with no evidence of malignancy. She was seen by Dr. Chairez in April and had a CBE. She continues to have right knee pain for OA. She is wearing a brace. She was recently started on a diet program with Dr. Casiano. She has lost 5 lbs in a month.       Past Medical History:   Diagnosis Date    Carcinoma of upper-inner quadrant of left female breast     GERD (gastroesophageal reflux disease)     High cholesterol     Hypothyroidism, unspecified     Obesity, unspecified     S/P radiation therapy         Current Outpatient Medications:     azelastine (ASTELIN) 137 mcg (0.1 %) nasal spray, , Disp: , Rfl:     calcium carbonate/vitamin D3 (CALCIUM WITH VITAMIN D ORAL),  0 Refill(s), Disp: , Rfl:     coenzyme Q10 100 mg capsule, Take 100 mg by mouth., Disp: , Rfl:     hydrocortisone 2.5 % cream, Apply topically., Disp: , Rfl:     ibuprofen-famotidine (DUEXIS) 800-26.6 mg Tab, Take by mouth., Disp: , Rfl:     letrozole (FEMARA) 2.5 mg Tab, Take 2.5 mg by mouth., Disp: , Rfl:     levothyroxine (SYNTHROID) 88 MCG tablet, Take 1 tablet (88 mcg total) by mouth before breakfast.,  Disp: 90 tablet, Rfl: 1    linaCLOtide (LINZESS) 145 mcg Cap capsule, Take 1 capsule (145 mcg total) by mouth before breakfast. PRN constipation, Disp: 90 capsule, Rfl: 4    multivitamin-minerals-lutein Tab, Take 1 tablet by mouth Daily., Disp: , Rfl:     phentermine-topiramate (QSYMIA) 11.25-69 mg CM24, Take 1 capsule by mouth once daily., Disp: 30 capsule, Rfl: 0    rosuvastatin (CRESTOR) 20 MG tablet, Take 20 mg by mouth., Disp: , Rfl:     lactulose (CHRONULAC) 10 gram/15 mL solution, Take 10 g by mouth., Disp: , Rfl:     omega 3-dha-epa-fish oil 1,200 (144-216) mg Cap, Take 1 capsule by mouth Daily., Disp: , Rfl:      Review of Systems:   Pertinent positives and negatives included in the HPI. Otherwise a complete review of systems is negative.      Objective:     Vitals:    07/25/22 1111   BP: (!) 137/90   Pulse: 87   Temp: 97.8 °F (36.6 °C)         Physical Exam  Constitutional:       Appearance: Normal appearance.   HENT:      Head: Normocephalic and atraumatic.      Nose: Nose normal.      Mouth/Throat:      Mouth: Mucous membranes are moist.   Eyes:      Extraocular Movements: Extraocular movements intact.      Conjunctiva/sclera: Conjunctivae normal.      Pupils: Pupils are equal, round, and reactive to light.   Cardiovascular:      Rate and Rhythm: Normal rate and regular rhythm.      Pulses: Normal pulses.   Pulmonary:      Effort: Pulmonary effort is normal.      Breath sounds: Normal breath sounds.   Chest:      Comments: Recent CBE done by Dr. Chairez in 4/2022  Abdominal:      General: Bowel sounds are normal.      Palpations: Abdomen is soft.   Musculoskeletal:      Cervical back: Normal range of motion and neck supple.   Neurological:      General: No focal deficit present.      Mental Status: She is alert and oriented to person, place, and time. Mental status is at baseline.   Psychiatric:         Mood and Affect: Mood normal.         Behavior: Behavior normal.         ECOG SCORE    0 -  Fully active-able to carry on all pre-disease performance without restriction        Assessment and Plan     Problem List Items Addressed This Visit        Oncology    Malignant neoplasm of female breast    Current Assessment & Plan     · Continue Femara x 5 years  Repeat B/L Mg due in 4/2023- Ordered by PCP  OK for Knee Replacement Surgery if needed--hold Femara for 4-6 weeks if surgery              Endocrine    Low serum vitamin B12       Orthopedic    Osteopenia    Current Assessment & Plan     Repeat Bone Density 6/2023                   Follow up in about 6 months (around 1/25/2023).      Answers for HPI/ROS submitted by the patient on 7/18/2022  appetite change : No  unexpected weight change: No  mouth sores: No  visual disturbance: No  cough: No  shortness of breath: No  chest pain: No  abdominal pain: No  diarrhea: No  frequency: No  back pain: No  rash: No  headaches: No  adenopathy: No  nervous/ anxious: No

## 2022-08-23 ENCOUNTER — DOCUMENTATION ONLY (OUTPATIENT)
Dept: ADMINISTRATIVE | Facility: HOSPITAL | Age: 62
End: 2022-08-23
Payer: COMMERCIAL

## 2022-08-23 ENCOUNTER — OFFICE VISIT (OUTPATIENT)
Dept: INTERNAL MEDICINE | Facility: CLINIC | Age: 62
End: 2022-08-23
Payer: COMMERCIAL

## 2022-08-23 VITALS
SYSTOLIC BLOOD PRESSURE: 122 MMHG | OXYGEN SATURATION: 99 % | HEIGHT: 64 IN | TEMPERATURE: 97 F | RESPIRATION RATE: 16 BRPM | BODY MASS INDEX: 34.59 KG/M2 | DIASTOLIC BLOOD PRESSURE: 78 MMHG | WEIGHT: 202.63 LBS | HEART RATE: 77 BPM

## 2022-08-23 DIAGNOSIS — E03.9 HYPOTHYROIDISM, UNSPECIFIED TYPE: Primary | ICD-10-CM

## 2022-08-23 DIAGNOSIS — K59.00 CONSTIPATION, UNSPECIFIED CONSTIPATION TYPE: ICD-10-CM

## 2022-08-23 DIAGNOSIS — M17.11 OSTEOARTHRITIS OF RIGHT KNEE, UNSPECIFIED OSTEOARTHRITIS TYPE: ICD-10-CM

## 2022-08-23 DIAGNOSIS — E66.01 MORBID (SEVERE) OBESITY DUE TO EXCESS CALORIES: ICD-10-CM

## 2022-08-23 DIAGNOSIS — E53.8 LOW SERUM VITAMIN B12: ICD-10-CM

## 2022-08-23 DIAGNOSIS — Z17.0 MALIGNANT NEOPLASM OF LEFT BREAST IN FEMALE, ESTROGEN RECEPTOR POSITIVE, UNSPECIFIED SITE OF BREAST: ICD-10-CM

## 2022-08-23 DIAGNOSIS — Z71.3 WEIGHT LOSS COUNSELING, ENCOUNTER FOR: ICD-10-CM

## 2022-08-23 DIAGNOSIS — C50.912 MALIGNANT NEOPLASM OF LEFT BREAST IN FEMALE, ESTROGEN RECEPTOR POSITIVE, UNSPECIFIED SITE OF BREAST: ICD-10-CM

## 2022-08-23 PROCEDURE — 1160F PR REVIEW ALL MEDS BY PRESCRIBER/CLIN PHARMACIST DOCUMENTED: ICD-10-PCS | Mod: CPTII,,, | Performed by: INTERNAL MEDICINE

## 2022-08-23 PROCEDURE — 1159F MED LIST DOCD IN RCRD: CPT | Mod: CPTII,,, | Performed by: INTERNAL MEDICINE

## 2022-08-23 PROCEDURE — 3074F SYST BP LT 130 MM HG: CPT | Mod: CPTII,,, | Performed by: INTERNAL MEDICINE

## 2022-08-23 PROCEDURE — 1159F PR MEDICATION LIST DOCUMENTED IN MEDICAL RECORD: ICD-10-PCS | Mod: CPTII,,, | Performed by: INTERNAL MEDICINE

## 2022-08-23 PROCEDURE — 3078F DIAST BP <80 MM HG: CPT | Mod: CPTII,,, | Performed by: INTERNAL MEDICINE

## 2022-08-23 PROCEDURE — 3074F PR MOST RECENT SYSTOLIC BLOOD PRESSURE < 130 MM HG: ICD-10-PCS | Mod: CPTII,,, | Performed by: INTERNAL MEDICINE

## 2022-08-23 PROCEDURE — 3008F PR BODY MASS INDEX (BMI) DOCUMENTED: ICD-10-PCS | Mod: CPTII,,, | Performed by: INTERNAL MEDICINE

## 2022-08-23 PROCEDURE — 99213 OFFICE O/P EST LOW 20 MIN: CPT | Mod: ,,, | Performed by: INTERNAL MEDICINE

## 2022-08-23 PROCEDURE — 99213 PR OFFICE/OUTPT VISIT, EST, LEVL III, 20-29 MIN: ICD-10-PCS | Mod: ,,, | Performed by: INTERNAL MEDICINE

## 2022-08-23 PROCEDURE — 3008F BODY MASS INDEX DOCD: CPT | Mod: CPTII,,, | Performed by: INTERNAL MEDICINE

## 2022-08-23 PROCEDURE — 1160F RVW MEDS BY RX/DR IN RCRD: CPT | Mod: CPTII,,, | Performed by: INTERNAL MEDICINE

## 2022-08-23 PROCEDURE — 3078F PR MOST RECENT DIASTOLIC BLOOD PRESSURE < 80 MM HG: ICD-10-PCS | Mod: CPTII,,, | Performed by: INTERNAL MEDICINE

## 2022-08-23 RX ORDER — PHENTERMINE AND TOPIRAMATE 11.25; 69 MG/1; MG/1
1 CAPSULE, EXTENDED RELEASE ORAL DAILY
Qty: 30 CAPSULE | Refills: 0 | Status: SHIPPED | OUTPATIENT
Start: 2022-08-23 | End: 2022-09-02

## 2022-08-23 NOTE — ASSESSMENT & PLAN NOTE
Stable   Stage 6: Additional Anesthesia Type: 1% lidocaine with 1:100,000 epinephrine and a 1:10 solution of 8.4% sodium bicarbonate

## 2022-08-23 NOTE — ASSESSMENT & PLAN NOTE
Super important for her joints that we do help her lose some weight especially as she is needing to pursue upcoming right knee replacement., continue Qsymia, patient advised to increase level of activity

## 2022-08-23 NOTE — PROGRESS NOTES
Subjective:      Patient ID: Letha Campbell is a 62 y.o. female.    Chief Complaint:  Obesity, right knee pain    HPI:    62-year-old obese  female here for 3 month revisit  She had left knee surgery 2020 with Dr. Frank Marks, is in need of right knee replacement but being delayed because her mother's well at current.  [facility]; last scope was in 11/2021 with multiple polys  [facility]; Stage IA infiltrating ductal carcinoma of the left breast ; on Femara  Weight down 20lbs  COVID vaccinated; needs flu vaccine  Right knee buckling she was seen by her orthopedist who recommends knee replacement on the right.  I would like to see her 20-25 lb lighter before that happens we discussed weight loss management today and she is open to pharmaceuticals.  Remainder of chronic medical conditions appear to be stable.  Trial of Qysmia started 3 months ago; down 6 lbs since last visit  Intake she states is minimal  However she is not very active because of her right knee pain.  She is taking care of her mother who is having hip back problems.  Strongly advised patient to increase activity.  Follow up with orthopedist.        Problem List Items Addressed This Visit        Oncology    Malignant neoplasm of female breast       Endocrine    Morbid (severe) obesity due to excess calories    Current Assessment & Plan     Continue Qsymia at current dose  Strongly advised exercise           Weight loss counseling, encounter for    Current Assessment & Plan     See above           Hypothyroidism - Primary    Current Assessment & Plan     Stable           Low serum vitamin B12    Current Assessment & Plan     Stable              GI    Constipation    Current Assessment & Plan     Stable              Orthopedic    Osteoarthritis of right knee    Current Assessment & Plan     Super important for her joints that we do help her lose some weight especially as she is needing to pursue upcoming right knee replacement.,  continue Qsymia, patient advised to increase level of activity                     Past Medical History:  Past Medical History:   Diagnosis Date    Carcinoma of upper-inner quadrant of left female breast     GERD (gastroesophageal reflux disease)     High cholesterol     Hypothyroidism, unspecified     Obesity, unspecified     S/P radiation therapy      Past Surgical History:   Procedure Laterality Date    BLADDER AUGMENTATION      BREAST BIOPSY  03/23/2021    BREAST LUMPECTOMY      COLONOSCOPY  07/01/2019    JOINT REPLACEMENT  2/2020    LIPOMA RESECTION      TONSILLECTOMY  1961    TONSILLECTOMY AND ADENOIDECTOMY      TOTAL KN[facility]ROPLASTY [facility]     [facility]    TUBAL LIGATION       Review of patient's allergies indicates:  No Known Allergies  Current Outpatient Medications on File Prior to Visit   Medication Sig Dispense Refill    azelastine (ASTELIN) 137 mcg (0.1 %) nasal spray       calcium carbonate/vitamin D3 (CALCIUM WITH VITAMIN D ORAL)   0 Refill(s)      coenzyme Q10 100 mg capsule Take 100 mg by mouth.      hydrocortisone 2.5 % cream Apply topically.      ibuprofen-famotidine (DUEXIS) 800-26.6 mg Tab Take by mouth.      lactulose (CHRONULAC) 10 gram/15 mL solution Take 10 g by mouth.      letrozole (FEMARA) 2.5 mg Tab Take 2.5 mg by mouth.      levothyroxine (SYNTHROID) 88 MCG tablet Take 1 tablet (88 mcg total) by mouth before breakfast. 90 tablet 1    linaCLOtide (LINZESS) 145 mcg Cap capsule Take 1 capsule (145 mcg total) by mouth before breakfast. PRN constipation 90 capsule 4    multivitamin-minerals-lutein Tab Take 1 tablet by mouth Daily.      omega 3-dha-epa-fish oil 1,200 (144-216) mg Cap Take 1 capsule by mouth Daily.      rosuvastatin (CRESTOR) 20 MG tablet Take 20 mg by mouth.       No current facility-administered medications on file prior to visit.     Social History     Socioeconomic History    Marital status:    Tobacco Use    Smoking status: Never  Smoker    Smokeless tobacco: Never Used   Substance and Sexual Activity    Alcohol use: Yes     Comment: Occassionally    Drug use: Never    Sexual activity: Not Currently     Family History   Problem Relation Age of Onset    Hyperlipidemia Mother     Arthritis Mother     Diabetes Mother     Hypertension Father     Diabetes Father     Arthritis Father     Prostate cancer Father     Cancer Father         Prostate Cancer           Review of Systems  Constitutional: No fever,  no fatigue, no chills, no night sweats, no weight gain, no weight loss, no changes in appetite.   Eye: No redness, no acute vision loss, no blurred vision, no double vision, no eye pain  ENMT: No sore throat, no nasal drainage, no nose bleeds,  no headache, no ear pain, no ear drainage, no acute hearing loss  Respiratory: No cough, no sputum production, no shortness of breath, no hemoptysis, no wheezing.  Cardiovascular: No chest pain, no chest tightness, no MEDRANO, no PND, no orthopnea, no swelling, no palpitations.  Gastrointestinal: No abdominal pain, no nausea, no vomiting, no diarrhea, no constipation, no difficulty swallowing, no change in bowel habits, no rectal bleeding  Genitourinary: no urgency, no frequency, no burning or pain when urinating, no blood in urine, no incontinence  Heme/Lymph: No easy bruising and/or bleeding, no swollen or painful glands.  Endocrine: No polyuria, no polydipsia, no polyphagia, no heat or cold intolerance.  Musculoskeletal: No muscle pain, no muscle weakness, + joint pain, no red or swollen joints.  Integumentary: No rash, no pruritis, no hair or nail changes.  Neurologic: No dizziness, no fainting, no tremors, no tingling and/ or numbness.  Psychiatric: No anxiety, no depression, no memory loss  All Other ROS: Negative with exception of what is documented in the history of present illness     Objective:   /78 (BP Location: Right arm, Patient Position: Sitting, BP Method: Medium (Manual))    "Pulse 77   Temp 97.3 °F (36.3 °C) (Temporal)   Resp 16   Ht 5' 4" (1.626 m)   Wt 91.9 kg (202 lb 9.6 oz)   SpO2 99%   BMI 34.78 kg/m²     Physical Exam  General : Alert and oriented, No acute distress, well, developed, well nourished, afebrile   Eye : PERRLA. EOMI.   HEENT : Normocephalic. Neck supple.   Psychiatric : Cooperative, Appropriate mood & affect. Normal judgment.          Assessment:     1. Hypothyroidism, unspecified type    2. Morbid (severe) obesity due to excess calories    3. Weight loss counseling, encounter for    4. Low serum vitamin B12    5. Malignant neoplasm of left breast in female, estrogen receptor positive, unspecified site of breast    6. Osteoarthritis of right knee, unspecified osteoarthritis type    7. Constipation, unspecified constipation type                  Plan:       I am having LETHA Campbell start on QSYMIA. I am also having her maintain her calcium carbonate/vitamin D3 (CALCIUM WITH VITAMIN D ORAL), omega 3-dha-epa-fish oil, multivitamin-minerals-lutein, azelastine, hydrocortisone, ibuprofen-famotidine, lactulose, letrozole, rosuvastatin, coenzyme Q10, linaCLOtide, and levothyroxine.      Problem List Items Addressed This Visit        Oncology    Malignant neoplasm of female breast       Endocrine    Morbid (severe) obesity due to excess calories     Continue Qsymia at current dose  Strongly advised exercise           Weight loss counseling, encounter for     See above           Hypothyroidism - Primary     Stable           Low serum vitamin B12     Stable              GI    Constipation     Stable              Orthopedic    Osteoarthritis of right knee     Super important for her joints that we do help her lose some weight especially as she is needing to pursue upcoming right knee replacement., continue Qsymia, patient advised to increase level of activity                   eLtha was seen today for weight loss.    Diagnoses and all orders for this " visit:    Hypothyroidism, unspecified type    Morbid (severe) obesity due to excess calories    Weight loss counseling, encounter for    Low serum vitamin B12    Malignant neoplasm of left breast in female, estrogen receptor positive, unspecified site of breast    Osteoarthritis of right knee, unspecified osteoarthritis type    Constipation, unspecified constipation type    Other orders  -     phentermine-topiramate (QSYMIA) 11.25-69 mg CM24; Take 1 tablet by mouth once daily. for 10 days            Medications Ordered This Encounter   Medications    phentermine-topiramate (QSYMIA) 11.25-69 mg CM24     Sig: Take 1 tablet by mouth once daily. for 10 days     Dispense:  30 capsule     Refill:  0     [unfilled]  No orders of the defined types were placed in this encounter.      Medication List with Changes/Refills   New Medications    PHENTERMINE-TOPIRAMATE (QSYMIA) 11.25-69 MG CM24    Take 1 tablet by mouth once daily. for 10 days   Current Medications    AZELASTINE (ASTELIN) 137 MCG (0.1 %) NASAL SPRAY        CALCIUM CARBONATE/VITAMIN D3 (CALCIUM WITH VITAMIN D ORAL)      0 Refill(s)    COENZYME Q10 100 MG CAPSULE    Take 100 mg by mouth.    HYDROCORTISONE 2.5 % CREAM    Apply topically.    IBUPROFEN-FAMOTIDINE (DUEXIS) 800-26.6 MG TAB    Take by mouth.    LACTULOSE (CHRONULAC) 10 GRAM/15 ML SOLUTION    Take 10 g by mouth.    LETROZOLE (FEMARA) 2.5 MG TAB    Take 2.5 mg by mouth.    LEVOTHYROXINE (SYNTHROID) 88 MCG TABLET    Take 1 tablet (88 mcg total) by mouth before breakfast.    LINACLOTIDE (LINZESS) 145 MCG CAP CAPSULE    Take 1 capsule (145 mcg total) by mouth before breakfast. PRN constipation    MULTIVITAMIN-MINERALS-LUTEIN TAB    Take 1 tablet by mouth Daily.    OMEGA 3-DHA-EPA-FISH OIL 1,200 (144-216) MG CAP    Take 1 capsule by mouth Daily.    ROSUVASTATIN (CRESTOR) 20 MG TABLET    Take 20 mg by mouth.      Medication List with Changes/Refills   New Medications    PHENTERMINE-TOPIRAMATE (QSYMIA) 11.25-69  MG CM24    Take 1 tablet by mouth once daily. for 10 days       Start Date: 8/23/2022 End Date: 9/2/2022   Current Medications    AZELASTINE (ASTELIN) 137 MCG (0.1 %) NASAL SPRAY           Start Date: 4/29/2022 End Date: --    CALCIUM CARBONATE/VITAMIN D3 (CALCIUM WITH VITAMIN D ORAL)      0 Refill(s)       Start Date: 10/14/2021End Date: --    COENZYME Q10 100 MG CAPSULE    Take 100 mg by mouth.       Start Date: --        End Date: --    HYDROCORTISONE 2.5 % CREAM    Apply topically.       Start Date: 6/2/2021  End Date: --    IBUPROFEN-FAMOTIDINE (DUEXIS) 800-26.6 MG TAB    Take by mouth.       Start Date: 11/24/2021End Date: --    LACTULOSE (CHRONULAC) 10 GRAM/15 ML SOLUTION    Take 10 g by mouth.       Start Date: 11/18/2021End Date: --    LETROZOLE (FEMARA) 2.5 MG TAB    Take 2.5 mg by mouth.       Start Date: 1/20/2022 End Date: --    LEVOTHYROXINE (SYNTHROID) 88 MCG TABLET    Take 1 tablet (88 mcg total) by mouth before breakfast.       Start Date: 6/23/2022 End Date: --    LINACLOTIDE (LINZESS) 145 MCG CAP CAPSULE    Take 1 capsule (145 mcg total) by mouth before breakfast. PRN constipation       Start Date: 5/18/2022 End Date: --    MULTIVITAMIN-MINERALS-LUTEIN TAB    Take 1 tablet by mouth Daily.       Start Date: 10/14/2021End Date: --    OMEGA 3-DHA-EPA-FISH OIL 1,200 (144-216) MG CAP    Take 1 capsule by mouth Daily.       Start Date: 10/14/2021End Date: --    ROSUVASTATIN (CRESTOR) 20 MG TABLET    Take 20 mg by mouth.       Start Date: 6/2/2021  End Date: --            Follow up in about 3 months (around 11/23/2022) for weight loss revisit.

## 2022-09-07 ENCOUNTER — OFFICE VISIT (OUTPATIENT)
Dept: ORTHOPEDICS | Facility: CLINIC | Age: 62
End: 2022-09-07
Payer: COMMERCIAL

## 2022-09-07 VITALS — HEIGHT: 64 IN | BODY MASS INDEX: 34.49 KG/M2 | WEIGHT: 202 LBS

## 2022-09-07 DIAGNOSIS — M17.11 LOCALIZED OSTEOARTHRITIS OF RIGHT KNEE: ICD-10-CM

## 2022-09-07 DIAGNOSIS — M25.561 RIGHT KNEE PAIN, UNSPECIFIED CHRONICITY: Primary | ICD-10-CM

## 2022-09-07 PROCEDURE — 1160F PR REVIEW ALL MEDS BY PRESCRIBER/CLIN PHARMACIST DOCUMENTED: ICD-10-PCS | Mod: CPTII,,, | Performed by: ORTHOPAEDIC SURGERY

## 2022-09-07 PROCEDURE — 1159F MED LIST DOCD IN RCRD: CPT | Mod: CPTII,,, | Performed by: ORTHOPAEDIC SURGERY

## 2022-09-07 PROCEDURE — 1160F RVW MEDS BY RX/DR IN RCRD: CPT | Mod: CPTII,,, | Performed by: ORTHOPAEDIC SURGERY

## 2022-09-07 PROCEDURE — 99213 PR OFFICE/OUTPT VISIT, EST, LEVL III, 20-29 MIN: ICD-10-PCS | Mod: ,,, | Performed by: ORTHOPAEDIC SURGERY

## 2022-09-07 PROCEDURE — 99213 OFFICE O/P EST LOW 20 MIN: CPT | Mod: ,,, | Performed by: ORTHOPAEDIC SURGERY

## 2022-09-07 PROCEDURE — 3008F BODY MASS INDEX DOCD: CPT | Mod: CPTII,,, | Performed by: ORTHOPAEDIC SURGERY

## 2022-09-07 PROCEDURE — 1159F PR MEDICATION LIST DOCUMENTED IN MEDICAL RECORD: ICD-10-PCS | Mod: CPTII,,, | Performed by: ORTHOPAEDIC SURGERY

## 2022-09-07 PROCEDURE — 3008F PR BODY MASS INDEX (BMI) DOCUMENTED: ICD-10-PCS | Mod: CPTII,,, | Performed by: ORTHOPAEDIC SURGERY

## 2022-09-07 NOTE — PROGRESS NOTES
Subjective:    CC: Pain of the Right Knee and Pain (R knee pain - pt states that her knee has been hurting pretty bad for about a month - she is wearin a hinged knee brace - td)       HPI:  Patient returns today repeat exam.  She has been using offloading brace.  She continues to have pain in the inside part of her right knee.  She is not interested in taking medications.  She has not done well with injections in the past.  She has also had a previous left total knee arthroplasty that bothers her at times.    ROS: Refer to HPI for pertinent ROS. All other 12 point systems negative.    Objective:    Physical Exam:  Right lower extremity compartment soft and warm.  Skin is intact.  There is no signs symptoms of DVT infection.  She does have a slight varus deformity positive patellar grind negative apprehension.  Her motion is 0-100 degrees she is stable to stressing she walks a hesitant gait, neurovascular intact distally.    Images:  X-rays three views right knee demonstrates tricompartmental osteoarthritis worse in the medial and patellofemoral compartments. Images Reviewed and discussed with patient.    Assessment:  1. Right knee pain, unspecified chronicity  - X-Ray Knee 3 View Right; Future    2. Localized osteoarthritis of right knee        Plan:  At this time we discussed her physical exam and x-ray findings.  We have discussed additional conservative treatment as well as surgical intervention.  We have discussed low-impact activities.  Patient will call or return sooner if there is any new problems or difficulties.  We have discussed a total knee arthroplasty.    Follow UP: No follow-ups on file.

## 2022-09-08 ENCOUNTER — PATIENT MESSAGE (OUTPATIENT)
Dept: ORTHOPEDICS | Facility: CLINIC | Age: 62
End: 2022-09-08
Payer: COMMERCIAL

## 2022-09-22 ENCOUNTER — HISTORICAL (OUTPATIENT)
Dept: ADMINISTRATIVE | Facility: HOSPITAL | Age: 62
End: 2022-09-22
Payer: COMMERCIAL

## 2022-10-17 ENCOUNTER — CLINICAL SUPPORT (OUTPATIENT)
Dept: INTERNAL MEDICINE | Facility: CLINIC | Age: 62
End: 2022-10-17
Payer: COMMERCIAL

## 2022-10-17 DIAGNOSIS — Z23 NEED FOR VACCINATION: Primary | ICD-10-CM

## 2022-10-17 PROCEDURE — 90686 IIV4 VACC NO PRSV 0.5 ML IM: CPT | Mod: ,,, | Performed by: INTERNAL MEDICINE

## 2022-10-17 PROCEDURE — 90471 FLU VACCINE (QUAD) GREATER THAN OR EQUAL TO 3YO PRESERVATIVE FREE IM: ICD-10-PCS | Mod: ,,, | Performed by: INTERNAL MEDICINE

## 2022-10-17 PROCEDURE — 90471 IMMUNIZATION ADMIN: CPT | Mod: ,,, | Performed by: INTERNAL MEDICINE

## 2022-10-17 PROCEDURE — 90686 FLU VACCINE (QUAD) GREATER THAN OR EQUAL TO 3YO PRESERVATIVE FREE IM: ICD-10-PCS | Mod: ,,, | Performed by: INTERNAL MEDICINE

## 2022-10-17 NOTE — PROGRESS NOTES
Pt came into office today for Flu vaccine.   Pt tolerated immunization well.   Flu vaccine was given in left Deltoid.

## 2022-11-22 ENCOUNTER — PATIENT MESSAGE (OUTPATIENT)
Dept: INTERNAL MEDICINE | Facility: CLINIC | Age: 62
End: 2022-11-22
Payer: COMMERCIAL

## 2022-11-22 ENCOUNTER — PATIENT MESSAGE (OUTPATIENT)
Dept: ORTHOPEDICS | Facility: CLINIC | Age: 62
End: 2022-11-22
Payer: COMMERCIAL

## 2022-11-25 ENCOUNTER — TELEPHONE (OUTPATIENT)
Dept: HEPATOLOGY | Facility: HOSPITAL | Age: 62
End: 2022-11-25
Payer: COMMERCIAL

## 2022-11-25 NOTE — TELEPHONE ENCOUNTER
Patient called.  Has respiratory tract symptoms and headache.  Took a COVID test yesterday and today and both were positive.  She is 62 in his obese and has high cholesterol.  I recommend she take Paxlovid x5 days.  Prescription sent to her pharmacy.  She will hold her statin drug for 1 week.   222

## 2022-11-28 ENCOUNTER — TELEPHONE (OUTPATIENT)
Dept: INTERNAL MEDICINE | Facility: CLINIC | Age: 62
End: 2022-11-28
Payer: COMMERCIAL

## 2022-11-28 NOTE — TELEPHONE ENCOUNTER
Spoke to pt and she stated that she did speak with Dr. Cassidy and she did get her paxlovid. She stated that she is feeling a lot better still has a cough, feels a little clammy, and is sweating (no fever).

## 2022-11-28 NOTE — TELEPHONE ENCOUNTER
----- Message from Rhys Woodard sent at 11/25/2022 11:29 AM CST -----  .Type:  Needs Medical Advice    Who Called: Letha  Symptoms (please be specific):  Tested positive for covid both yesterday and today with home test  How long has patient had these symptoms: She was aware that a doctor would be on call from her conversation she had with Dr. Casiano's office on Wednesday when she spoke with them.    Pharmacy name and phone #:    Would the patient rather a call back or a response via [facility]?   Best Call Back Number: [phone]  Additional Information: I have sent message to Dr. Cassidy on call for Dr. Casiano's patient. I paged him at 11;25 am on Friday, 11/25/2022.

## 2022-12-22 ENCOUNTER — TELEPHONE (OUTPATIENT)
Dept: INTERNAL MEDICINE | Facility: CLINIC | Age: 62
End: 2022-12-22
Payer: COMMERCIAL

## 2022-12-22 DIAGNOSIS — E05.90 HYPERTHYROIDISM: ICD-10-CM

## 2022-12-22 RX ORDER — LEVOTHYROXINE SODIUM 88 UG/1
88 TABLET ORAL
Qty: 90 TABLET | Refills: 3 | Status: SHIPPED | OUTPATIENT
Start: 2022-12-22

## 2022-12-22 NOTE — TELEPHONE ENCOUNTER
----- Message from Shruthi Carballo sent at 12/22/2022  3:43 PM CST -----  Pt requesting a refill on levothyroxine (SYNTHROID) 88 MCG tablet  Super  - Mike Guerrero

## 2023-01-07 ENCOUNTER — DOCUMENTATION ONLY (OUTPATIENT)
Dept: FAMILY MEDICINE | Facility: CLINIC | Age: 63
End: 2023-01-07
Payer: COMMERCIAL

## 2023-01-08 DIAGNOSIS — Z17.0 MALIGNANT NEOPLASM OF LEFT BREAST IN FEMALE, ESTROGEN RECEPTOR POSITIVE, UNSPECIFIED SITE OF BREAST: Primary | ICD-10-CM

## 2023-01-08 DIAGNOSIS — C50.912 MALIGNANT NEOPLASM OF LEFT BREAST IN FEMALE, ESTROGEN RECEPTOR POSITIVE, UNSPECIFIED SITE OF BREAST: Primary | ICD-10-CM

## 2023-01-09 ENCOUNTER — PATIENT MESSAGE (OUTPATIENT)
Dept: ORTHOPEDICS | Facility: CLINIC | Age: 63
End: 2023-01-09
Payer: COMMERCIAL

## 2023-01-09 DIAGNOSIS — Z96.652 HISTORY OF TOTAL KNEE REPLACEMENT, LEFT: Primary | ICD-10-CM

## 2023-01-09 RX ORDER — LETROZOLE 2.5 MG/1
TABLET, FILM COATED ORAL
Qty: 90 TABLET | Refills: 2 | Status: SHIPPED | OUTPATIENT
Start: 2023-01-09 | End: 2023-01-09 | Stop reason: SDUPTHER

## 2023-01-09 RX ORDER — PENICILLIN V POTASSIUM 500 MG/1
TABLET, FILM COATED ORAL
Qty: 4 TABLET | Refills: 0 | Status: SHIPPED | OUTPATIENT
Start: 2023-01-09 | End: 2023-03-30

## 2023-01-09 RX ORDER — LETROZOLE 2.5 MG/1
2.5 TABLET, FILM COATED ORAL DAILY
Qty: 30 TABLET | Refills: 1 | Status: SHIPPED | OUTPATIENT
Start: 2023-01-09 | End: 2023-01-26 | Stop reason: SDUPTHER

## 2023-01-19 DIAGNOSIS — C50.912 MALIGNANT NEOPLASM OF LEFT BREAST IN FEMALE, ESTROGEN RECEPTOR POSITIVE, UNSPECIFIED SITE OF BREAST: Primary | ICD-10-CM

## 2023-01-19 DIAGNOSIS — Z17.0 MALIGNANT NEOPLASM OF LEFT BREAST IN FEMALE, ESTROGEN RECEPTOR POSITIVE, UNSPECIFIED SITE OF BREAST: Primary | ICD-10-CM

## 2023-01-24 NOTE — ASSESSMENT & PLAN NOTE
· Continue Femara x 5 years  Repeat B/L Mg due in 4/2023- Ordered by PCP  Return to clinic in 6 months with CBC, CMP, vitamin-D level   Continue letrozole     Appointment with Nivia

## 2023-01-24 NOTE — PROGRESS NOTES
Heme/Onc Progress Note    PATIENT: Letha Campbell  MRN: [id number]  DATE: 1/26/2023  Chief Complaint: 6 month follow up, Question about what happens after 5 years taking femara, and Hot Flashes    Current Treatment: Femara    Oncology History   Malignant neoplasm of female breast   3/23/2021 Cancer Staged    Staging form: Breast, AJCC 8th Edition  - Pathologic stage from 3/23/2021: Stage IA (pT1a, pN0, cM0, G2, ER+, OH+, HER2-)       5/6/2021 Surgery    Left breast lumpectomy sentinel lymph node biopsy  Pathology: Well differentiated tumor grade 1, tumor size 5 mm, all margins negative, DCIS, deep margin reresection negative, 1 sentinel lymph node negative for malignancy, pathological staging T1 a N0 M0  Dr. Chairez      - 7/2021 Radiation Therapy    Completed XRT     6/2/2021 -  Hormone Therapy    Femara         01/26/2023  6m follow up. On femara. Her last mammogram was in 4/2022 of Bl breast with no evidence of malignancy.       Past Medical History:   Diagnosis Date    Carcinoma of upper-inner quadrant of left female breast     GERD (gastroesophageal reflux disease)     High cholesterol     Hypothyroidism, unspecified     Obesity, unspecified     Personal history of colonic polyps     S/P radiation therapy         Current Outpatient Medications:     azelastine (ASTELIN) 137 mcg (0.1 %) nasal spray, , Disp: , Rfl:     calcium carbonate/vitamin D3 (CALCIUM WITH VITAMIN D ORAL),  0 Refill(s), Disp: , Rfl:     coenzyme Q10 100 mg capsule, Take 100 mg by mouth., Disp: , Rfl:     hydrocortisone 2.5 % cream, Apply topically., Disp: , Rfl:     ibuprofen-famotidine (DUEXIS) 800-26.6 mg Tab, Take by mouth., Disp: , Rfl:     levothyroxine (SYNTHROID) 88 MCG tablet, Take 1 tablet (88 mcg total) by mouth before breakfast., Disp: 90 tablet, Rfl: 3    linaCLOtide (LINZESS) 145 mcg Cap capsule, Take 1 capsule (145 mcg total) by mouth before breakfast. PRN constipation, Disp: 90 capsule, Rfl: 4     multivitamin-minerals-lutein Tab, Take 1 tablet by mouth Daily., Disp: , Rfl:     rosuvastatin (CRESTOR) 20 MG tablet, Take 20 mg by mouth., Disp: , Rfl:     letrozole (FEMARA) 2.5 mg Tab, Take 1 tablet (2.5 mg total) by mouth once daily., Disp: 90 tablet, Rfl: 1    penicillin v potassium (VEETID) 500 MG tablet, Take 4 tablets one hour prior to procedure. (Patient not taking: Reported on 1/26/2023), Disp: 4 tablet, Rfl: 0     Review of Systems:   Review of Systems   Constitutional:  Negative for appetite change and unexpected weight change.   HENT:  Negative for mouth sores.    Eyes:  Negative for visual disturbance.   Respiratory:  Negative for cough and shortness of breath.    Cardiovascular:  Negative for chest pain.   Gastrointestinal:  Negative for abdominal pain and diarrhea.   Genitourinary:  Negative for frequency.   Musculoskeletal:  Negative for back pain.   Skin:  Negative for rash.   Neurological:  Negative for headaches.   Hematological:  Negative for adenopathy.   Psychiatric/Behavioral:  The patient is not nervous/anxious.         Objective:     Vitals:    01/26/23 1125   BP: 121/82   Pulse: (!) 59   Temp: 97.8 °F (36.6 °C)         Physical Exam  Constitutional:       Appearance: Normal appearance.   HENT:      Head: Normocephalic and atraumatic.      Nose: Nose normal.      Mouth/Throat:      Mouth: Mucous membranes are moist.   Eyes:      Extraocular Movements: Extraocular movements intact.      Conjunctiva/sclera: Conjunctivae normal.      Pupils: Pupils are equal, round, and reactive to light.   Cardiovascular:      Rate and Rhythm: Normal rate and regular rhythm.      Pulses: Normal pulses.   Pulmonary:      Effort: Pulmonary effort is normal.      Breath sounds: Normal breath sounds.   Chest:   Breasts:     Left: No bleeding.      Comments: Palpable masses or abnormalities postsurgical change  Abdominal:      General: Bowel sounds are normal.      Palpations: Abdomen is soft.   Musculoskeletal:       Cervical back: Normal range of motion and neck supple.   Neurological:      General: No focal deficit present.      Mental Status: She is alert and oriented to person, place, and time. Mental status is at baseline.   Psychiatric:         Mood and Affect: Mood normal.         Behavior: Behavior normal.       ECOG SCORE    0 - Fully active-able to carry on all pre-disease performance without restriction        Lab Review:none today      Assessment and Plan     Problem List Items Addressed This Visit          Oncology    Malignant neoplasm of female breast - Primary    Current Assessment & Plan     Continue Femara x 5 years  Repeat B/L Mg due in 4/2023- Ordered by PCP  Return to clinic in 6 months with CBC, CMP, vitamin-D level   Continue letrozole     Appointment with Nivia         Relevant Medications    letrozole (FEMARA) 2.5 mg Tab       Orthopedic    Osteopenia    Current Assessment & Plan     Repeat Bone Density 6/2023  Continue calcium vitamin-D            Relevant Orders    DXA Bone Density Spine And Hip     Other Visit Diagnoses       Long term (current) use of aromatase inhibitors        Relevant Orders    DXA Bone Density Spine And Hip          She will discuss with Dr. Spears and her gyn her mammogram orders        Follow up in about 6 months (around 7/26/2023) for with Nivia ( bone density results), and if she is not had a CBC CMP vitamin-D level  drawn that day.      Dale Cain MD

## 2023-01-26 ENCOUNTER — OFFICE VISIT (OUTPATIENT)
Dept: INTERNAL MEDICINE | Facility: CLINIC | Age: 63
End: 2023-01-26
Payer: COMMERCIAL

## 2023-01-26 ENCOUNTER — OFFICE VISIT (OUTPATIENT)
Dept: HEMATOLOGY/ONCOLOGY | Facility: CLINIC | Age: 63
End: 2023-01-26
Payer: COMMERCIAL

## 2023-01-26 VITALS
TEMPERATURE: 98 F | DIASTOLIC BLOOD PRESSURE: 82 MMHG | BODY MASS INDEX: 34.74 KG/M2 | OXYGEN SATURATION: 97 % | SYSTOLIC BLOOD PRESSURE: 121 MMHG | HEART RATE: 59 BPM | WEIGHT: 203.5 LBS | HEIGHT: 64 IN

## 2023-01-26 VITALS
HEIGHT: 64 IN | WEIGHT: 202 LBS | HEART RATE: 96 BPM | OXYGEN SATURATION: 98 % | RESPIRATION RATE: 16 BRPM | BODY MASS INDEX: 34.49 KG/M2 | SYSTOLIC BLOOD PRESSURE: 130 MMHG | TEMPERATURE: 97 F | DIASTOLIC BLOOD PRESSURE: 78 MMHG

## 2023-01-26 DIAGNOSIS — Z79.811 LONG TERM (CURRENT) USE OF AROMATASE INHIBITORS: ICD-10-CM

## 2023-01-26 DIAGNOSIS — M17.12 ARTHRITIS OF LEFT KNEE: ICD-10-CM

## 2023-01-26 DIAGNOSIS — C50.912 MALIGNANT NEOPLASM OF LEFT BREAST IN FEMALE, ESTROGEN RECEPTOR POSITIVE, UNSPECIFIED SITE OF BREAST: Primary | ICD-10-CM

## 2023-01-26 DIAGNOSIS — Z17.0 MALIGNANT NEOPLASM OF LEFT BREAST IN FEMALE, ESTROGEN RECEPTOR POSITIVE, UNSPECIFIED SITE OF BREAST: Primary | ICD-10-CM

## 2023-01-26 DIAGNOSIS — M85.80 OSTEOPENIA, UNSPECIFIED LOCATION: ICD-10-CM

## 2023-01-26 DIAGNOSIS — R30.0 DYSURIA: Primary | ICD-10-CM

## 2023-01-26 PROCEDURE — 3008F PR BODY MASS INDEX (BMI) DOCUMENTED: ICD-10-PCS | Mod: CPTII,,, | Performed by: INTERNAL MEDICINE

## 2023-01-26 PROCEDURE — 3078F PR MOST RECENT DIASTOLIC BLOOD PRESSURE < 80 MM HG: ICD-10-PCS | Mod: CPTII,,, | Performed by: INTERNAL MEDICINE

## 2023-01-26 PROCEDURE — 3074F PR MOST RECENT SYSTOLIC BLOOD PRESSURE < 130 MM HG: ICD-10-PCS | Mod: CPTII,S$GLB,, | Performed by: INTERNAL MEDICINE

## 2023-01-26 PROCEDURE — 3075F PR MOST RECENT SYSTOLIC BLOOD PRESS GE 130-139MM HG: ICD-10-PCS | Mod: CPTII,,, | Performed by: INTERNAL MEDICINE

## 2023-01-26 PROCEDURE — 3078F DIAST BP <80 MM HG: CPT | Mod: CPTII,,, | Performed by: INTERNAL MEDICINE

## 2023-01-26 PROCEDURE — 1160F RVW MEDS BY RX/DR IN RCRD: CPT | Mod: CPTII,,, | Performed by: INTERNAL MEDICINE

## 2023-01-26 PROCEDURE — 3008F BODY MASS INDEX DOCD: CPT | Mod: CPTII,,, | Performed by: INTERNAL MEDICINE

## 2023-01-26 PROCEDURE — 99214 PR OFFICE/OUTPT VISIT, EST, LEVL IV, 30-39 MIN: ICD-10-PCS | Mod: S$GLB,,, | Performed by: INTERNAL MEDICINE

## 2023-01-26 PROCEDURE — 1159F PR MEDICATION LIST DOCUMENTED IN MEDICAL RECORD: ICD-10-PCS | Mod: CPTII,,, | Performed by: INTERNAL MEDICINE

## 2023-01-26 PROCEDURE — 1159F MED LIST DOCD IN RCRD: CPT | Mod: CPTII,,, | Performed by: INTERNAL MEDICINE

## 2023-01-26 PROCEDURE — 3074F SYST BP LT 130 MM HG: CPT | Mod: CPTII,S$GLB,, | Performed by: INTERNAL MEDICINE

## 2023-01-26 PROCEDURE — 99999 PR PBB SHADOW E&M-EST. PATIENT-LVL IV: ICD-10-PCS | Mod: PBBFAC,,, | Performed by: INTERNAL MEDICINE

## 2023-01-26 PROCEDURE — 99213 OFFICE O/P EST LOW 20 MIN: CPT | Mod: ,,, | Performed by: INTERNAL MEDICINE

## 2023-01-26 PROCEDURE — 1160F RVW MEDS BY RX/DR IN RCRD: CPT | Mod: CPTII,S$GLB,, | Performed by: INTERNAL MEDICINE

## 2023-01-26 PROCEDURE — 1159F PR MEDICATION LIST DOCUMENTED IN MEDICAL RECORD: ICD-10-PCS | Mod: CPTII,S$GLB,, | Performed by: INTERNAL MEDICINE

## 2023-01-26 PROCEDURE — 99999 PR PBB SHADOW E&M-EST. PATIENT-LVL IV: CPT | Mod: PBBFAC,,, | Performed by: INTERNAL MEDICINE

## 2023-01-26 PROCEDURE — 1160F PR REVIEW ALL MEDS BY PRESCRIBER/CLIN PHARMACIST DOCUMENTED: ICD-10-PCS | Mod: CPTII,,, | Performed by: INTERNAL MEDICINE

## 2023-01-26 PROCEDURE — 1160F PR REVIEW ALL MEDS BY PRESCRIBER/CLIN PHARMACIST DOCUMENTED: ICD-10-PCS | Mod: CPTII,S$GLB,, | Performed by: INTERNAL MEDICINE

## 2023-01-26 PROCEDURE — 3008F BODY MASS INDEX DOCD: CPT | Mod: CPTII,S$GLB,, | Performed by: INTERNAL MEDICINE

## 2023-01-26 PROCEDURE — 3075F SYST BP GE 130 - 139MM HG: CPT | Mod: CPTII,,, | Performed by: INTERNAL MEDICINE

## 2023-01-26 PROCEDURE — 99213 PR OFFICE/OUTPT VISIT, EST, LEVL III, 20-29 MIN: ICD-10-PCS | Mod: ,,, | Performed by: INTERNAL MEDICINE

## 2023-01-26 PROCEDURE — 3008F PR BODY MASS INDEX (BMI) DOCUMENTED: ICD-10-PCS | Mod: CPTII,S$GLB,, | Performed by: INTERNAL MEDICINE

## 2023-01-26 PROCEDURE — 1159F MED LIST DOCD IN RCRD: CPT | Mod: CPTII,S$GLB,, | Performed by: INTERNAL MEDICINE

## 2023-01-26 PROCEDURE — 99214 OFFICE O/P EST MOD 30 MIN: CPT | Mod: S$GLB,,, | Performed by: INTERNAL MEDICINE

## 2023-01-26 PROCEDURE — 3079F PR MOST RECENT DIASTOLIC BLOOD PRESSURE 80-89 MM HG: ICD-10-PCS | Mod: CPTII,S$GLB,, | Performed by: INTERNAL MEDICINE

## 2023-01-26 PROCEDURE — 3079F DIAST BP 80-89 MM HG: CPT | Mod: CPTII,S$GLB,, | Performed by: INTERNAL MEDICINE

## 2023-01-26 RX ORDER — LETROZOLE 2.5 MG/1
2.5 TABLET, FILM COATED ORAL DAILY
Qty: 90 TABLET | Refills: 1 | Status: SHIPPED | OUTPATIENT
Start: 2023-01-26 | End: 2023-02-15

## 2023-01-26 RX ORDER — PHENTERMINE HYDROCHLORIDE 37.5 MG/1
37.5 TABLET ORAL
Qty: 30 TABLET | Refills: 0 | Status: SHIPPED | OUTPATIENT
Start: 2023-01-26 | End: 2023-02-25

## 2023-01-26 NOTE — PROGRESS NOTES
Subjective:      Patient ID: Letha Campbell is a 62 y.o. female.    Chief Complaint: Weight Loss      HPI:  She had left knee surgery 2020 with Dr. Frank Marks, recent right knee pain initiated MRI of macerated and extruded meniscus as well as stage IV chondromalacia goes back to see him on Wednesday  [facility]; last scope was in 11/2021 with multiple polys  [facility]; Stage IA infiltrating ductal carcinoma of the left breast ; on Femara  Weight down 20lbs  COVID vaccinated; needs flu vaccine  Patient in need of knee replacement however advised by surgeon that she needed to lose 25-30 lb.  Her weight today is 202 at her last visit she was 203.  Her highest weight is in July of last year at 208.  She does report some foul-smelling urine today.        Past Medical History:  Past Medical History:   Diagnosis Date    Carcinoma of upper-inner quadrant of left female breast     GERD (gastroesophageal reflux disease)     High cholesterol     Hypothyroidism, unspecified     Obesity, unspecified     Personal history of colonic polyps     S/P radiation therapy      Past Surgical History:   Procedure Laterality Date    BLADDER AUGMENTATION      BREAST BIOPSY  03/23/2021    BREAST LUMPECTOMY      COLONOSCOPY  07/01/2019    COLONOSCOPY  11/22/2021    JOINT REPLACEMENT  2/2020    LIPOMA RESECTION      TONSILLECTOMY  1961    TONSILLECTOMY AND ADENOIDECTOMY      TOTAL KNEE ARTHROPLASTY Left     JUDE KELBY    TUBAL LIGATION       Review of patient's allergies indicates:  No Known Allergies  Current Outpatient Medications on File Prior to Visit   Medication Sig Dispense Refill    azelastine (ASTELIN) 137 mcg (0.1 %) nasal spray       calcium carbonate/vitamin D3 (CALCIUM WITH VITAMIN D ORAL)   0 Refill(s)      coenzyme Q10 100 mg capsule Take 100 mg by mouth.      hydrocortisone 2.5 % cream Apply topically.      ibuprofen-famotidine (DUEXIS) 800-26.6 mg Tab Take by mouth.      letrozole (FEMARA) 2.5 mg Tab Take 1 tablet  "(2.5 mg total) by mouth once daily. 90 tablet 1    levothyroxine (SYNTHROID) 88 MCG tablet Take 1 tablet (88 mcg total) by mouth before breakfast. 90 tablet 3    linaCLOtide (LINZESS) 145 mcg Cap capsule Take 1 capsule (145 mcg total) by mouth before breakfast. PRN constipation 90 capsule 4    multivitamin-minerals-lutein Tab Take 1 tablet by mouth Daily.      rosuvastatin (CRESTOR) 20 MG tablet Take 20 mg by mouth.      penicillin v potassium (VEETID) 500 MG tablet Take 4 tablets one hour prior to procedure. (Patient not taking: Reported on 1/26/2023) 4 tablet 0    [DISCONTINUED] lactulose (CHRONULAC) 10 gram/15 mL solution Take 10 g by mouth.      [DISCONTINUED] letrozole (FEMARA) 2.5 mg Tab Take 1 tablet (2.5 mg total) by mouth once daily. 30 tablet 1    [DISCONTINUED] omega 3-dha-epa-fish oil 1,200 (144-216) mg Cap Take 1 capsule by mouth Daily.       No current facility-administered medications on file prior to visit.     Social History     Socioeconomic History    Marital status:    Tobacco Use    Smoking status: Never    Smokeless tobacco: Never   Substance and Sexual Activity    Alcohol use: Yes     Comment: Occassionally    Drug use: Never    Sexual activity: Not Currently     Family History   Problem Relation Age of Onset    Hyperlipidemia Mother     Arthritis Mother     Diabetes Mother     Hypertension Father     Diabetes Father     Arthritis Father     Prostate cancer Father     Cancer Father         Prostate Cancer       Review of Systems  A comprehensive review of systems was performed and was negative with exception of what is documented above.     Objective:   /78 (BP Location: Left arm, Patient Position: Sitting, BP Method: Medium (Manual))   Pulse 96   Temp 97.3 °F (36.3 °C) (Temporal)   Resp 16   Ht 5' 4" (1.626 m)   Wt 91.6 kg (202 lb)   SpO2 98%   BMI 34.67 kg/m²   Physical Exam  General : Alert and oriented, No acute distress, afebrile.  Eye : PERRLA. EOMI. Normal " conjunctiva, Sclerae are nonicteric.   Integumentary : Warm, moist, intact.  Neurologic : Alert, Oriented  Psychiatric : Cooperative, Appropriate mood & affect.   Assessment/ Plan:   1. Dysuria  Assessment & Plan:  Patient reports foul-smelling urine for the past several months, will recheck urinalysis today.      2. Arthritis of left knee  Assessment & Plan:  Patient with chronic left knee osteoarthritis, needing knee replacement, still needs to lose upwards of 25-30 lb.  She tried Qsymia last year and lost 6 lb.  We are going to put her back on Adipex she is just recently started logging her food in [employer].  We are going to see her back in 4 weeks.      Other orders  -     phentermine (ADIPEX-P) 37.5 mg tablet; Take 1 tablet (37.5 mg total) by mouth before breakfast.  Dispense: 30 tablet; Refill: 0             Follow up in about 4 weeks (around 2/23/2023).

## 2023-01-26 NOTE — ASSESSMENT & PLAN NOTE
Patient with chronic left knee osteoarthritis, needing knee replacement, still needs to lose upwards of 25-30 lb.  She tried Qsymia last year and lost 6 lb.  We are going to put her back on Adipex she is just recently started logging her food in [employer].  We are going to see her back in 4 weeks.

## 2023-01-26 NOTE — PROGRESS NOTES
Patient ID: [id number]     Chief Complaint: Weight Loss      HPI:     Letha Campbell is a 62 y.o. female here today for a Medicare Wellness.   She had left knee surgery 2020 with Dr. Frank Marks, recent right knee pain initiated MRI of macerated and extruded meniscus as well as stage IV chondromalacia goes back to see him on Wednesday  [facility]; last scope was in 11/2021 with multiple polys  [facility]; Stage IA infiltrating ductal carcinoma of the left breast ; on Femara  Weight down 20lbs  COVID vaccinated; needs flu vaccine  Right knee buckling she was seen by her orthopedist who recommends knee replacement on the right.  I would like to see her 20-25 lb lighter before that happens we discussed weight loss management today and she is open to pharmaceuticals.  Remainder of chronic medical conditions appear to be stable.    Opioid Screening: Patient medication list reviewed, patient {IS / IS NOT:09762} taking prescription opioids. Patient {IS / IS NOT:07655} using additional opioids than prescribed. Patient {IS / IS NOT:57061} at low risk of substance abuse based on this opioid use history.       ----------------------------  Carcinoma of upper-inner quadrant of left female breast  GERD (gastroesophageal reflux disease)  High cholesterol  Hypothyroidism, unspecified  Obesity, unspecified  Personal history of colonic polyps  S/P radiation therapy     Past Surgical History:   Procedure Laterality Date    BLADDER AUGMENTATION      BREAST BIOPSY  03/23/2021    BREAST LUMPECTOMY      COLONOSCOPY  07/01/2019    COLONOSCOPY  11/22/2021    JOINT REPLACEMENT  2/2020    LIPOMA RESECTION      TONSILLECTOMY  1961    TONSILLECTOMY AND ADENOIDECTOMY      TOTAL KNEE ARTHROPLASTY Left     JUDE KELBY    TUBAL LIGATION         Review of patient's allergies indicates:  No Known Allergies    Outpatient Medications Marked as Taking for the 1/26/23 encounter (Office Visit) with Jasper Hagan MD   Medication Sig  "Dispense Refill    azelastine (ASTELIN) 137 mcg (0.1 %) nasal spray       calcium carbonate/vitamin D3 (CALCIUM WITH VITAMIN D ORAL)   0 Refill(s)      coenzyme Q10 100 mg capsule Take 100 mg by mouth.      hydrocortisone 2.5 % cream Apply topically.      ibuprofen-famotidine (DUEXIS) 800-26.6 mg Tab Take by mouth.      letrozole (FEMARA) 2.5 mg Tab Take 1 tablet (2.5 mg total) by mouth once daily. 90 tablet 1    levothyroxine (SYNTHROID) 88 MCG tablet Take 1 tablet (88 mcg total) by mouth before breakfast. 90 tablet 3    linaCLOtide (LINZESS) 145 mcg Cap capsule Take 1 capsule (145 mcg total) by mouth before breakfast. PRN constipation 90 capsule 4    multivitamin-minerals-lutein Tab Take 1 tablet by mouth Daily.      rosuvastatin (CRESTOR) 20 MG tablet Take 20 mg by mouth.         Social History     Socioeconomic History    Marital status:    Tobacco Use    Smoking status: Never    Smokeless tobacco: Never   Substance and Sexual Activity    Alcohol use: Yes     Comment: Occassionally    Drug use: Never    Sexual activity: Not Currently        Family History   Problem Relation Age of Onset    Hyperlipidemia Mother     Arthritis Mother     Diabetes Mother     Hypertension Father     Diabetes Father     Arthritis Father     Prostate cancer Father     Cancer Father         Prostate Cancer        Patient Care Team:  Jasper Hagan MD as PCP - General (Internal Medicine)  Turner Bear MD as Consulting Physician (Gastroenterology)       Subjective:     ROS      Patient Reported Health Risk Assessment       Objective:     /78 (BP Location: Left arm, Patient Position: Sitting, BP Method: Medium (Manual))   Pulse 96   Temp 97.3 °F (36.3 °C) (Temporal)   Resp 16   Ht 5' 4" (1.626 m)   Wt 91.6 kg (202 lb)   SpO2 98%   BMI 34.67 kg/m²     Physical Exam      No flowsheet data found.  Fall Risk Assessment - Outpatient 1/26/2023 1/26/2023 9/7/2022 8/23/2022 7/25/2022 5/18/2022   Mobility Status " Ambulatory Ambulatory Ambulatory Ambulatory Ambulatory Ambulatory   Number of falls 0 0 0 0 0 0   Identified as fall risk 0 0 0 0 0 0              Assessment/Plan:     {There are no diagnoses linked to this encounter. (Refresh or delete this SmartLink)}       Medicare Annual Wellness and Personalized Prevention Plan:   Fall Risk + Home Safety + Hearing Impairment + Depression Screen + Opioid and Substance Abuse Screening + Cognitive Impairment Screen + Health Risk Assessment all reviewed.     Health Maintenance Topics with due status: Not Due       Topic Last Completion Date    Hemoglobin A1c (Diabetic Prevention Screening) 11/13/2021    Colorectal Cancer Screening 11/22/2021    Cervical Cancer Screening 01/24/2022    Mammogram 04/12/2022    Lipid Panel 05/14/2022      The patient's Health Maintenance was reviewed and the following appears to be due at this time:   Health Maintenance Due   Topic Date Due    Hepatitis C Screening  Never done    HIV Screening  Never done    TETANUS VACCINE  Never done    Shingles Vaccine (1 of 2) Never done    COVID-19 Vaccine (3 - Booster for Norma series) 01/10/2022       Advance Care Planning   I attest to discussing Advance Care Planning with patient and/or family member.  Education was provided including the importance of the Health Care Power of , Advance Directives, and/or [place] documentation.  The patient expressed understanding to the importance of this information and discussion.         No follow-ups on file. In addition to their scheduled follow up, the patient has also been instructed to follow up on as needed basis.

## 2023-01-27 RX ORDER — NITROFURANTOIN 25; 75 MG/1; MG/1
100 CAPSULE ORAL 2 TIMES DAILY
Qty: 10 CAPSULE | Refills: 0 | Status: SHIPPED | OUTPATIENT
Start: 2023-01-27 | End: 2023-03-30

## 2023-02-06 ENCOUNTER — OFFICE VISIT (OUTPATIENT)
Dept: ORTHOPEDICS | Facility: CLINIC | Age: 63
End: 2023-02-06
Payer: COMMERCIAL

## 2023-02-06 VITALS
DIASTOLIC BLOOD PRESSURE: 88 MMHG | HEIGHT: 64 IN | HEART RATE: 73 BPM | WEIGHT: 202.19 LBS | BODY MASS INDEX: 34.52 KG/M2 | SYSTOLIC BLOOD PRESSURE: 118 MMHG

## 2023-02-06 DIAGNOSIS — M79.89 RIGHT LEG SWELLING: ICD-10-CM

## 2023-02-06 DIAGNOSIS — M17.11 PRIMARY OSTEOARTHRITIS OF RIGHT KNEE: Primary | ICD-10-CM

## 2023-02-06 DIAGNOSIS — M79.661 RIGHT CALF PAIN: ICD-10-CM

## 2023-02-06 DIAGNOSIS — M79.604 RIGHT LEG PAIN: ICD-10-CM

## 2023-02-06 PROCEDURE — 3008F PR BODY MASS INDEX (BMI) DOCUMENTED: ICD-10-PCS | Mod: CPTII,,,

## 2023-02-06 PROCEDURE — 99213 PR OFFICE/OUTPT VISIT, EST, LEVL III, 20-29 MIN: ICD-10-PCS | Mod: 25,,,

## 2023-02-06 PROCEDURE — 20610 LARGE JOINT ASPIRATION/INJECTION: R KNEE: ICD-10-PCS | Mod: RT,,,

## 2023-02-06 PROCEDURE — 1159F PR MEDICATION LIST DOCUMENTED IN MEDICAL RECORD: ICD-10-PCS | Mod: CPTII,,,

## 2023-02-06 PROCEDURE — 3074F PR MOST RECENT SYSTOLIC BLOOD PRESSURE < 130 MM HG: ICD-10-PCS | Mod: CPTII,,,

## 2023-02-06 PROCEDURE — 3074F SYST BP LT 130 MM HG: CPT | Mod: CPTII,,,

## 2023-02-06 PROCEDURE — 3079F PR MOST RECENT DIASTOLIC BLOOD PRESSURE 80-89 MM HG: ICD-10-PCS | Mod: CPTII,,,

## 2023-02-06 PROCEDURE — 3008F BODY MASS INDEX DOCD: CPT | Mod: CPTII,,,

## 2023-02-06 PROCEDURE — 20610 DRAIN/INJ JOINT/BURSA W/O US: CPT | Mod: RT,,,

## 2023-02-06 PROCEDURE — 99213 OFFICE O/P EST LOW 20 MIN: CPT | Mod: 25,,,

## 2023-02-06 PROCEDURE — 1159F MED LIST DOCD IN RCRD: CPT | Mod: CPTII,,,

## 2023-02-06 PROCEDURE — 3079F DIAST BP 80-89 MM HG: CPT | Mod: CPTII,,,

## 2023-02-06 RX ORDER — AMOXICILLIN AND CLAVULANATE POTASSIUM 500; 125 MG/1; MG/1
1 TABLET, FILM COATED ORAL
COMMUNITY
End: 2023-03-30

## 2023-02-06 RX ORDER — BETAMETHASONE SODIUM PHOSPHATE AND BETAMETHASONE ACETATE 3; 3 MG/ML; MG/ML
12 INJECTION, SUSPENSION INTRA-ARTICULAR; INTRALESIONAL; INTRAMUSCULAR; SOFT TISSUE
Status: DISCONTINUED | OUTPATIENT
Start: 2023-02-06 | End: 2023-02-06 | Stop reason: HOSPADM

## 2023-02-06 RX ORDER — LIDOCAINE HYDROCHLORIDE 20 MG/ML
3 INJECTION, SOLUTION INFILTRATION; PERINEURAL
Status: DISCONTINUED | OUTPATIENT
Start: 2023-02-06 | End: 2023-02-06 | Stop reason: HOSPADM

## 2023-02-06 RX ADMIN — LIDOCAINE HYDROCHLORIDE 3 MG: 20 INJECTION, SOLUTION INFILTRATION; PERINEURAL at 09:02

## 2023-02-06 RX ADMIN — BETAMETHASONE SODIUM PHOSPHATE AND BETAMETHASONE ACETATE 12 MG: 3; 3 INJECTION, SUSPENSION INTRA-ARTICULAR; INTRALESIONAL; INTRAMUSCULAR; SOFT TISSUE at 09:02

## 2023-02-06 NOTE — PROGRESS NOTES
Subjective:    CC: Pain of the Right Knee and Knee Pain (Rt knee pain, pain started last night nothing brought on pain,pt states knee locked up couldnt straighten or stand on leg,pt states samething happened January of 2022. pt ambulating with crutches also wearing knee brace, braces helps some. pt took ibuprofen for pain last night.)       HPI:  Patient presents to clinic for repeat evaluation of right knee.  She does have a longstanding history of OA.  Previous left TKA.  Patient states she was doing well until last night when she was lying in bed and suddenly started having difficulty straightening and weight-bearing on leg.  She denies any falls, trauma, inciting events.  She states this is previous previously happened in January and resolved on its own.  Wearing unloading brace today in clinic.  Taking ibuprofen 800 as needed.  She denies any swelling or popping.  Utilizing crutches in clinic today but states her pain is overall improving since last night.  She states most of her pain is at the proximal posterior calf, denies any history of DVT, denies being on any blood thinners.    ROS: Refer to HPI for pertinent ROS. All other 12 point systems negative.    Objective:    Vitals:    02/06/23 1015   BP: 118/88   Pulse: 73        Physical Exam:  The patient is well-nourished, well-developed and in no apparent distress, pleasant and cooperative. Examination of the right lower extremity compartments are soft and warm. Skin is intact. There are no signs or symptoms of DVT or infection. There is mild joint effusion. There is no erythema. Tender to palpation along the proximal calf as well as posterior knee ,right knee range of motion is 5-105. The knee is stable to exam with varus and valgus stressing. Negative anterior and posterior drawer. Negative Lachman´s.  Questionable Pepe's test.  Positive Homans.  Patella grind is positive, Negative for apprehension. Neurovascularly intact distally.    Images:  Previous  Images Reviewed and discussed with patient.    Assessment:  1. Primary osteoarthritis of right knee  - Large Joint Aspiration/Injection: R knee  - LIDOcaine HCL 20 mg/ml (2%) injection 3 mg  - betamethasone acetate-betamethasone sodium phosphate injection 12 mg    2. Right calf pain    3. Right leg pain  - US Lower Extremity Veins Right; Future  - US Lower Extremity Veins Right    4. Right leg swelling  - US Lower Extremity Veins Right; Future  - US Lower Extremity Veins Right       Plan:  Physical exam and previous imaging findings discussed with patient.  She tolerated a right knee steroid injection in clinic today.  She is also going to receive a right lower extremity DVT ultrasound to rule out a blood clot.  Continue OTC anti-inflammatories as needed with appropriate precautions and low-impact activities.  I will call patient with results of ultrasound.  Otherwise I would like to see the patient back in 3 months to assess her progress.  She states that she would not like to proceed with surgery at this time.    Follow up: Follow up in about 3 months (around 5/6/2023).    Large Joint Aspiration/Injection: R knee    Date/Time: 2/6/2023 9:30 AM  Performed by: Maira Castaneda PA-C  Authorized by: Maira Castaneda PA-C     Consent Done?:  Yes (Verbal)  Indications:  Pain  Site marked: the procedure site was marked    Prep: patient was prepped and draped in usual sterile fashion    Approach:  Anterolateral  Location:  Knee  Site:  R knee  Medications:  12 mg betamethasone acetate-betamethasone sodium phosphate 6 mg/mL; 3 mg LIDOcaine HCL 20 mg/ml (2%) 20 mg/mL (2 %)  Patient tolerance:  Patient tolerated the procedure well with no immediate complications

## 2023-02-06 NOTE — PROCEDURES
Large Joint Aspiration/Injection: R knee    Date/Time: 2/6/2023 9:30 AM  Performed by: Maira Castaneda PA-C  Authorized by: Maira Castaneda PA-C     Consent Done?:  Yes (Verbal)  Indications:  Pain  Site marked: the procedure site was marked    Prep: patient was prepped and draped in usual sterile fashion    Approach:  Anterolateral  Location:  Knee  Site:  R knee  Medications:  12 mg betamethasone acetate-betamethasone sodium phosphate 6 mg/mL; 3 mg LIDOcaine HCL 20 mg/ml (2%) 20 mg/mL (2 %)  Patient tolerance:  Patient tolerated the procedure well with no immediate complications

## 2023-02-16 ENCOUNTER — CLINICAL SUPPORT (OUTPATIENT)
Dept: INTERNAL MEDICINE | Facility: CLINIC | Age: 63
End: 2023-02-16
Payer: COMMERCIAL

## 2023-02-16 ENCOUNTER — TELEPHONE (OUTPATIENT)
Dept: INTERNAL MEDICINE | Facility: CLINIC | Age: 63
End: 2023-02-16

## 2023-02-16 DIAGNOSIS — J06.9 UPPER RESPIRATORY TRACT INFECTION, UNSPECIFIED TYPE: ICD-10-CM

## 2023-02-16 DIAGNOSIS — J06.9 UPPER RESPIRATORY TRACT INFECTION, UNSPECIFIED TYPE: Primary | ICD-10-CM

## 2023-02-16 DIAGNOSIS — U07.1 COVID-19 VIRUS DETECTED: ICD-10-CM

## 2023-02-16 LAB
FLUAV AG UPPER RESP QL IA.RAPID: NOT DETECTED
FLUBV AG UPPER RESP QL IA.RAPID: NOT DETECTED
SARS-COV-2 RNA RESP QL NAA+PROBE: DETECTED

## 2023-02-16 PROCEDURE — 0240U COVID/FLU A&B PCR: CPT | Performed by: INTERNAL MEDICINE

## 2023-02-16 NOTE — TELEPHONE ENCOUNTER
Spoke to pt and she stated that she is having cough, congestion, sore throat, and headache for 2 days. She has not tested herself for COVID. She would like to know what she should do because she is supposed to have an dental procedure tomorrow.

## 2023-02-16 NOTE — TELEPHONE ENCOUNTER
"Per MARKEL Callahan, "Recommend swabbing for flu + covid. Will likely need to delay procedure until illness resolves. OK to place order."  "

## 2023-02-16 NOTE — TELEPHONE ENCOUNTER
Spoke to pt. Pt Verbally confirmed understanding.  Pt stated that she is going to come here for a COVID/Flu swab.

## 2023-02-16 NOTE — TELEPHONE ENCOUNTER
----- Message from Shawanda Hollingsworth sent at 2/16/2023  8:22 AM CST -----  Regarding: advice  Type:  Needs Medical Advice    Who Called: pt  Symptoms (please be specific): headache, congestion, cough, sore throat   How long has patient had these symptoms:  2 day  Pharmacy name and phone #:  [facility] [place]  Would the patient rather a call back or a response via [facility]? C/b  Best Call Back Number: [phone]  Additional Information: pt is having oral dentistry done tomorrow and has been on amoxicillin for about 5 days.

## 2023-02-16 NOTE — TELEPHONE ENCOUNTER
----- Message from Jasper Hagan MD sent at 2/16/2023  4:04 PM CST -----  COVID test is positive, Rx Paxlovid sent to pharmacy please do not take statin medication while taking this medication.  Today will be her day 1 she can come out of quarantine on day 6 as long as she is asymptomatic  ----- Message -----  From: Background User Lab  Sent: 2/16/2023   3:36 PM CST  To: Jasper Hagan MD

## 2023-03-30 ENCOUNTER — OFFICE VISIT (OUTPATIENT)
Dept: INTERNAL MEDICINE | Facility: CLINIC | Age: 63
End: 2023-03-30
Payer: COMMERCIAL

## 2023-03-30 DIAGNOSIS — E66.01 MORBID (SEVERE) OBESITY DUE TO EXCESS CALORIES: ICD-10-CM

## 2023-03-30 DIAGNOSIS — Z71.3 WEIGHT LOSS COUNSELING, ENCOUNTER FOR: ICD-10-CM

## 2023-03-30 DIAGNOSIS — R30.0 DYSURIA: Primary | ICD-10-CM

## 2023-03-30 PROCEDURE — 1160F PR REVIEW ALL MEDS BY PRESCRIBER/CLIN PHARMACIST DOCUMENTED: ICD-10-PCS | Mod: CPTII,,, | Performed by: INTERNAL MEDICINE

## 2023-03-30 PROCEDURE — 3288F PR FALLS RISK ASSESSMENT DOCUMENTED: ICD-10-PCS | Mod: CPTII,,, | Performed by: INTERNAL MEDICINE

## 2023-03-30 PROCEDURE — 99213 PR OFFICE/OUTPT VISIT, EST, LEVL III, 20-29 MIN: ICD-10-PCS | Mod: ,,, | Performed by: INTERNAL MEDICINE

## 2023-03-30 PROCEDURE — 99213 OFFICE O/P EST LOW 20 MIN: CPT | Mod: ,,, | Performed by: INTERNAL MEDICINE

## 2023-03-30 PROCEDURE — 1159F MED LIST DOCD IN RCRD: CPT | Mod: CPTII,,, | Performed by: INTERNAL MEDICINE

## 2023-03-30 PROCEDURE — 1101F PT FALLS ASSESS-DOCD LE1/YR: CPT | Mod: CPTII,,, | Performed by: INTERNAL MEDICINE

## 2023-03-30 PROCEDURE — 1101F PR PT FALLS ASSESS DOC 0-1 FALLS W/OUT INJ PAST YR: ICD-10-PCS | Mod: CPTII,,, | Performed by: INTERNAL MEDICINE

## 2023-03-30 PROCEDURE — 1159F PR MEDICATION LIST DOCUMENTED IN MEDICAL RECORD: ICD-10-PCS | Mod: CPTII,,, | Performed by: INTERNAL MEDICINE

## 2023-03-30 PROCEDURE — 3288F FALL RISK ASSESSMENT DOCD: CPT | Mod: CPTII,,, | Performed by: INTERNAL MEDICINE

## 2023-03-30 PROCEDURE — 1160F RVW MEDS BY RX/DR IN RCRD: CPT | Mod: CPTII,,, | Performed by: INTERNAL MEDICINE

## 2023-03-30 RX ORDER — EPINEPHRINE 0.3 MG/.3ML
INJECTION SUBCUTANEOUS
COMMUNITY
Start: 2023-02-24

## 2023-03-30 NOTE — PROGRESS NOTES
Subjective:      Patient ID: Letha Campbell is a 63 y.o. female.    Chief Complaint: Weight Loss      HPI:  62-year-old obese  female   She had left knee surgery 2020 with Dr. Frank Marks, is in need of right knee replacement but being delayed because her mother's well at current.  [place]; last scope was in 11/2021 with multiple polys  [facility]; Stage IA infiltrating ductal carcinoma of the left breast ; on Femara  Weight down 20lbs  COVID vaccinated; needs flu vaccine  Right knee buckling she was seen by her orthopedist who recommends knee replacement on the right.  I would like to see her 20-25 lb lighter before that happens we discussed weight loss management today and she is open to pharmaceuticals.  Remainder of chronic medical conditions appear to be stable.    Intake she states is minimal  However she is not very active because of her right knee pain.  She is taking care of her mother who is having hip back problems.  Strongly advised patient to increase activity.  Follow up with orthopedist.  Shes supposed to be on Adipex; however she had a tooth infection and then a knee infection  She then had COVID and a sinus infection  Daily log of food discussed; advice given     Past Medical History:  Past Medical History:   Diagnosis Date    Carcinoma of upper-inner quadrant of left female breast     GERD (gastroesophageal reflux disease)     High cholesterol     Hypothyroidism, unspecified     Obesity, unspecified     Personal history of colonic polyps     S/P radiation therapy      Past Surgical History:   Procedure Laterality Date    BLADDER AUGMENTATION      BREAST BIOPSY  03/23/2021    BREAST LUMPECTOMY      COLONOSCOPY  07/01/2019    COLONOSCOPY  11/22/2021    JOINT REPLACEMENT  2/2020    LIPOMA RESECTION      TONSILLECTOMY  1961    TONSILLECTOMY AND ADENOIDECTOMY      TOTAL KNEE ARTHROPLASTY Left     JUDE KELBY    TUBAL LIGATION       Review of patient's allergies indicates:  No Known  Allergies  Current Outpatient Medications on File Prior to Visit   Medication Sig Dispense Refill    azelastine (ASTELIN) 137 mcg (0.1 %) nasal spray       calcium carbonate/vitamin D3 (CALCIUM WITH VITAMIN D ORAL)   0 Refill(s)      coenzyme Q10 100 mg capsule Take 100 mg by mouth.      EPINEPHrine (EPIPEN) 0.3 mg/0.3 mL AtIn Inject into the muscle.      hydrocortisone 2.5 % cream Apply topically.      ibuprofen-famotidine (DUEXIS) 800-26.6 mg Tab Take by mouth.      letrozole (FEMARA) 2.5 mg Tab TAKE ONE TABLET BY MOUTH EVERY DAY 90 tablet 1    levothyroxine (SYNTHROID) 88 MCG tablet Take 1 tablet (88 mcg total) by mouth before breakfast. 90 tablet 3    linaCLOtide (LINZESS) 145 mcg Cap capsule Take 1 capsule (145 mcg total) by mouth before breakfast. PRN constipation 90 capsule 4    multivitamin-minerals-lutein Tab Take 1 tablet by mouth Daily.      rosuvastatin (CRESTOR) 20 MG tablet Take 20 mg by mouth.      [DISCONTINUED] amoxicillin-clavulanate 500-125mg (AUGMENTIN) 500-125 mg Tab Take 1 tablet by mouth every 12 (twelve) hours.      [DISCONTINUED] nitrofurantoin, macrocrystal-monohydrate, (MACROBID) 100 MG capsule Take 1 capsule (100 mg total) by mouth 2 (two) times daily. (Patient not taking: Reported on 2/6/2023) 10 capsule 0    [DISCONTINUED] penicillin v potassium (VEETID) 500 MG tablet Take 4 tablets one hour prior to procedure. (Patient not taking: Reported on 2/6/2023) 4 tablet 0     No current facility-administered medications on file prior to visit.     Social History     Socioeconomic History    Marital status:    Tobacco Use    Smoking status: Never    Smokeless tobacco: Never   Substance and Sexual Activity    Alcohol use: Yes     Comment: Occassionally    Drug use: Never    Sexual activity: Not Currently     Family History   Problem Relation Age of Onset    Hyperlipidemia Mother     Arthritis Mother     Diabetes Mother     Hypertension Father     Diabetes Father     Arthritis Father      Prostate cancer Father     Cancer Father         Prostate Cancer       Review of Systems  A comprehensive review of systems was performed and was negative with exception of what is documented above.     Objective:   There were no vitals taken for this visit.  Physical Exam  General : Alert and oriented, No acute distress, afebrile.Obese  Eye : PERRLA. EOMI. Normal conjunctiva, Sclerae are nonicteric.   Musculoskeletal : Normal range of motion throughout. No muscle tenderness.  Integumentary : Warm, moist, intact.  Neurologic : Alert, Oriented  Psychiatric : Cooperative, Appropriate mood & affect.   Assessment/ Plan:   1. Dysuria  -     Cancel: Urinalysis, Reflex to Urine Culture; Future; Expected date: 03/30/2023    2. Weight loss counseling, encounter for  Assessment & Plan:  Resume Adipex  Month 2 starting today       3. Morbid (severe) obesity due to excess calories             Follow up in about 4 weeks (around 4/27/2023) for weight loss revisit.

## 2023-03-31 ENCOUNTER — PATIENT MESSAGE (OUTPATIENT)
Dept: INTERNAL MEDICINE | Facility: CLINIC | Age: 63
End: 2023-03-31
Payer: COMMERCIAL

## 2023-04-04 RX ORDER — PHENTERMINE HYDROCHLORIDE 37.5 MG/1
37.5 TABLET ORAL
Qty: 30 TABLET | Refills: 0 | Status: SHIPPED | OUTPATIENT
Start: 2023-04-04 | End: 2023-05-04

## 2023-04-28 ENCOUNTER — OFFICE VISIT (OUTPATIENT)
Dept: INTERNAL MEDICINE | Facility: CLINIC | Age: 63
End: 2023-04-28
Payer: COMMERCIAL

## 2023-04-28 VITALS
TEMPERATURE: 97 F | BODY MASS INDEX: 34.15 KG/M2 | RESPIRATION RATE: 16 BRPM | HEART RATE: 85 BPM | WEIGHT: 200 LBS | SYSTOLIC BLOOD PRESSURE: 126 MMHG | OXYGEN SATURATION: 98 % | DIASTOLIC BLOOD PRESSURE: 80 MMHG | HEIGHT: 64 IN

## 2023-04-28 DIAGNOSIS — Z00.00 WELLNESS EXAMINATION: ICD-10-CM

## 2023-04-28 DIAGNOSIS — Z13.29 SCREENING FOR HYPOTHYROIDISM: ICD-10-CM

## 2023-04-28 DIAGNOSIS — E55.9 VITAMIN D DEFICIENCY: ICD-10-CM

## 2023-04-28 DIAGNOSIS — Z71.3 WEIGHT LOSS COUNSELING, ENCOUNTER FOR: Primary | ICD-10-CM

## 2023-04-28 PROCEDURE — 3288F PR FALLS RISK ASSESSMENT DOCUMENTED: ICD-10-PCS | Mod: CPTII,,, | Performed by: INTERNAL MEDICINE

## 2023-04-28 PROCEDURE — 1159F PR MEDICATION LIST DOCUMENTED IN MEDICAL RECORD: ICD-10-PCS | Mod: CPTII,,, | Performed by: INTERNAL MEDICINE

## 2023-04-28 PROCEDURE — 3008F BODY MASS INDEX DOCD: CPT | Mod: CPTII,,, | Performed by: INTERNAL MEDICINE

## 2023-04-28 PROCEDURE — 3074F PR MOST RECENT SYSTOLIC BLOOD PRESSURE < 130 MM HG: ICD-10-PCS | Mod: CPTII,,, | Performed by: INTERNAL MEDICINE

## 2023-04-28 PROCEDURE — 3079F PR MOST RECENT DIASTOLIC BLOOD PRESSURE 80-89 MM HG: ICD-10-PCS | Mod: CPTII,,, | Performed by: INTERNAL MEDICINE

## 2023-04-28 PROCEDURE — 3079F DIAST BP 80-89 MM HG: CPT | Mod: CPTII,,, | Performed by: INTERNAL MEDICINE

## 2023-04-28 PROCEDURE — 3074F SYST BP LT 130 MM HG: CPT | Mod: CPTII,,, | Performed by: INTERNAL MEDICINE

## 2023-04-28 PROCEDURE — 99213 OFFICE O/P EST LOW 20 MIN: CPT | Mod: ,,, | Performed by: INTERNAL MEDICINE

## 2023-04-28 PROCEDURE — 3008F PR BODY MASS INDEX (BMI) DOCUMENTED: ICD-10-PCS | Mod: CPTII,,, | Performed by: INTERNAL MEDICINE

## 2023-04-28 PROCEDURE — 1160F PR REVIEW ALL MEDS BY PRESCRIBER/CLIN PHARMACIST DOCUMENTED: ICD-10-PCS | Mod: CPTII,,, | Performed by: INTERNAL MEDICINE

## 2023-04-28 PROCEDURE — 1101F PR PT FALLS ASSESS DOC 0-1 FALLS W/OUT INJ PAST YR: ICD-10-PCS | Mod: CPTII,,, | Performed by: INTERNAL MEDICINE

## 2023-04-28 PROCEDURE — 99213 PR OFFICE/OUTPT VISIT, EST, LEVL III, 20-29 MIN: ICD-10-PCS | Mod: ,,, | Performed by: INTERNAL MEDICINE

## 2023-04-28 PROCEDURE — 1101F PT FALLS ASSESS-DOCD LE1/YR: CPT | Mod: CPTII,,, | Performed by: INTERNAL MEDICINE

## 2023-04-28 PROCEDURE — 3288F FALL RISK ASSESSMENT DOCD: CPT | Mod: CPTII,,, | Performed by: INTERNAL MEDICINE

## 2023-04-28 PROCEDURE — 1159F MED LIST DOCD IN RCRD: CPT | Mod: CPTII,,, | Performed by: INTERNAL MEDICINE

## 2023-04-28 PROCEDURE — 1160F RVW MEDS BY RX/DR IN RCRD: CPT | Mod: CPTII,,, | Performed by: INTERNAL MEDICINE

## 2023-04-28 RX ORDER — PHENTERMINE HYDROCHLORIDE 37.5 MG/1
37.5 TABLET ORAL
Qty: 30 TABLET | Refills: 0 | Status: SHIPPED | OUTPATIENT
Start: 2023-04-28 | End: 2023-05-28

## 2023-04-28 RX ORDER — NITROFURANTOIN 25; 75 MG/1; MG/1
100 CAPSULE ORAL 2 TIMES DAILY
COMMUNITY
End: 2023-07-27

## 2023-04-28 NOTE — ASSESSMENT & PLAN NOTE
Completing month to of Adipex, strongly encouraged to increase her intake of free water given her little headache I do want her to hold off on the Adipex for the next few days make sure she has adequate intake of fluids makes her headache has resolved.  Check her blood pressure blood pressure looks excellent today.  Will refill her Adipex for 1 month more, advised on taking just half a tablet, she definitely needs to increase her activity and log her food as previously stated.  RTC 3 months with labs, orders placed

## 2023-04-28 NOTE — PROGRESS NOTES
Subjective:      Patient ID: Letha Campbell is a 63 y.o. female.    Chief Complaint: Weight Loss      HPI:  63 year-old obese  female for weight loss reivist  She had left knee surgery 2020 with Dr. Frank Marks, is in need of right knee replacement but being delayed because her mother's well at current.  [facility]; last scope was in 11/2021 with multiple polys  [facility]; Stage IA infiltrating ductal carcinoma of the left breast ; on Femara  Weight down 20lbs  COVID vaccinated; needs flu vaccine  Right knee buckling she was seen by her orthopedist who recommends knee replacement on the right.  I would like to see her 20-25 lb lighter before that happens we discussed weight loss management today and she is open to pharmaceuticals.  Remainder of chronic medical conditions appear to be stable.     Intake she states is minimal  However she is not very active because of her right knee pain.  She is taking care of her mother who is having hip back problems.  Strongly advised patient to increase activity.  Follow up with orthopedist.  Shes supposed to be on Adipex; however she had a tooth infection and then a knee infection  She then had COVID and a sinus infection  Daily log of food discussed; advice given     Adipex month 2  Weight down 2lbs  Has a headache  Not exercising  Not drinking enough water      Past Medical History:  Past Medical History:   Diagnosis Date    Carcinoma of upper-inner quadrant of left female breast     GERD (gastroesophageal reflux disease)     High cholesterol     Hypothyroidism, unspecified     Obesity, unspecified     Personal history of colonic polyps     S/P radiation therapy      Past Surgical History:   Procedure Laterality Date    BLADDER AUGMENTATION      BREAST BIOPSY  03/23/2021    BREAST LUMPECTOMY      COLONOSCOPY  07/01/2019    COLONOSCOPY  11/22/2021    JOINT REPLACEMENT  2/2020    LIPOMA RESECTION      TONSILLECTOMY  1961    TONSILLECTOMY AND ADENOIDECTOMY       TOTAL KNEE ARTHROPLASTY Left     JUDE KELBY    TUBAL LIGATION       Review of patient's allergies indicates:  No Known Allergies  Current Outpatient Medications on File Prior to Visit   Medication Sig Dispense Refill    azelastine (ASTELIN) 137 mcg (0.1 %) nasal spray       calcium carbonate/vitamin D3 (CALCIUM WITH VITAMIN D ORAL)   0 Refill(s)      coenzyme Q10 100 mg capsule Take 100 mg by mouth.      EPINEPHrine (EPIPEN) 0.3 mg/0.3 mL AtIn Inject into the muscle.      hydrocortisone 2.5 % cream Apply topically.      ibuprofen-famotidine (DUEXIS) 800-26.6 mg Tab Take by mouth.      letrozole (FEMARA) 2.5 mg Tab TAKE ONE TABLET BY MOUTH EVERY DAY 90 tablet 1    levothyroxine (SYNTHROID) 88 MCG tablet Take 1 tablet (88 mcg total) by mouth before breakfast. 90 tablet 3    linaCLOtide (LINZESS) 145 mcg Cap capsule Take 1 capsule (145 mcg total) by mouth before breakfast. PRN constipation 90 capsule 4    multivitamin-minerals-lutein Tab Take 1 tablet by mouth Daily.      nitrofurantoin, macrocrystal-monohydrate, (MACROBID) 100 MG capsule Take 100 mg by mouth 2 (two) times daily.      phentermine (ADIPEX-P) 37.5 mg tablet Take 1 tablet (37.5 mg total) by mouth before breakfast. 30 tablet 0    rosuvastatin (CRESTOR) 20 MG tablet Take 20 mg by mouth.       No current facility-administered medications on file prior to visit.     Social History     Socioeconomic History    Marital status:    Tobacco Use    Smoking status: Never    Smokeless tobacco: Never   Substance and Sexual Activity    Alcohol use: Yes     Comment: Occassionally    Drug use: Never    Sexual activity: Not Currently     Family History   Problem Relation Age of Onset    Hyperlipidemia Mother     Arthritis Mother     Diabetes Mother     Hypertension Father     Diabetes Father     Arthritis Father     Prostate cancer Father     Cancer Father         Prostate Cancer       Review of Systems  A comprehensive review of systems was performed and was  "negative with exception of what is documented above.     Objective:   /80 (BP Location: Right arm, Patient Position: Sitting, BP Method: Medium (Manual))   Pulse 85   Temp 97.4 °F (36.3 °C) (Temporal)   Resp 16   Ht 5' 4" (1.626 m)   Wt 90.7 kg (200 lb)   SpO2 98%   BMI 34.33 kg/m²   Physical Exam  General : Alert and oriented, No acute distress, afebrile.  Eye : PERRLA. EOMI. Normal conjunctiva, Sclerae are nonicteric.   Musculoskeletal : Normal range of motion throughout. No muscle tenderness.  Integumentary : Warm, moist, intact.  Neurologic : Alert, Oriented  Psychiatric : Cooperative, Appropriate mood & affect.   Assessment/ Plan:   1. Weight loss counseling, encounter for  Assessment & Plan:  Completing month to of Adipex, strongly encouraged to increase her intake of free water given her little headache I do want her to hold off on the Adipex for the next few days make sure she has adequate intake of fluids makes her headache has resolved.  Check her blood pressure blood pressure looks excellent today.  Will refill her Adipex for 1 month more, advised on taking just half a tablet, she definitely needs to increase her activity and log her food as previously stated.  RTC 3 months for routine revisit.  No labs      2. Wellness examination  -     CBC Auto Differential; Future; Expected date: 04/28/2023  -     Lipid Panel; Future; Expected date: 04/28/2023  -     TSH; Future; Expected date: 04/28/2023  -     Hemoglobin A1C; Future; Expected date: 04/28/2023  -     Urinalysis, Reflex to Urine Culture; Future; Expected date: 04/28/2023  -     Vitamin D; Future; Expected date: 04/28/2023    3. Screening for hypothyroidism  -     CBC Auto Differential; Future; Expected date: 04/28/2023  -     Lipid Panel; Future; Expected date: 04/28/2023  -     TSH; Future; Expected date: 04/28/2023  -     Hemoglobin A1C; Future; Expected date: 04/28/2023  -     Urinalysis, Reflex to Urine Culture; Future; Expected date: " 04/28/2023  -     Vitamin D; Future; Expected date: 04/28/2023    4. Vitamin D deficiency  -     CBC Auto Differential; Future; Expected date: 04/28/2023  -     Lipid Panel; Future; Expected date: 04/28/2023  -     TSH; Future; Expected date: 04/28/2023  -     Hemoglobin A1C; Future; Expected date: 04/28/2023  -     Urinalysis, Reflex to Urine Culture; Future; Expected date: 04/28/2023  -     Vitamin D; Future; Expected date: 04/28/2023    Other orders  -     phentermine (ADIPEX-P) 37.5 mg tablet; Take 1 tablet (37.5 mg total) by mouth before breakfast.  Dispense: 30 tablet; Refill: 0             Follow up in about 3 months (around 7/28/2023) for WEIGHT LOSS REVISIT.

## 2023-05-11 ENCOUNTER — OFFICE VISIT (OUTPATIENT)
Dept: ORTHOPEDICS | Facility: CLINIC | Age: 63
End: 2023-05-11
Payer: COMMERCIAL

## 2023-05-11 VITALS
HEART RATE: 82 BPM | DIASTOLIC BLOOD PRESSURE: 88 MMHG | WEIGHT: 200 LBS | HEIGHT: 64 IN | BODY MASS INDEX: 34.15 KG/M2 | SYSTOLIC BLOOD PRESSURE: 130 MMHG

## 2023-05-11 DIAGNOSIS — M17.11 PRIMARY OSTEOARTHRITIS OF RIGHT KNEE: Primary | ICD-10-CM

## 2023-05-11 PROCEDURE — 1160F PR REVIEW ALL MEDS BY PRESCRIBER/CLIN PHARMACIST DOCUMENTED: ICD-10-PCS | Mod: CPTII,,, | Performed by: ORTHOPAEDIC SURGERY

## 2023-05-11 PROCEDURE — 99213 PR OFFICE/OUTPT VISIT, EST, LEVL III, 20-29 MIN: ICD-10-PCS | Mod: ,,, | Performed by: ORTHOPAEDIC SURGERY

## 2023-05-11 PROCEDURE — 3075F SYST BP GE 130 - 139MM HG: CPT | Mod: CPTII,,, | Performed by: ORTHOPAEDIC SURGERY

## 2023-05-11 PROCEDURE — 3075F PR MOST RECENT SYSTOLIC BLOOD PRESS GE 130-139MM HG: ICD-10-PCS | Mod: CPTII,,, | Performed by: ORTHOPAEDIC SURGERY

## 2023-05-11 PROCEDURE — 3079F DIAST BP 80-89 MM HG: CPT | Mod: CPTII,,, | Performed by: ORTHOPAEDIC SURGERY

## 2023-05-11 PROCEDURE — 1159F MED LIST DOCD IN RCRD: CPT | Mod: CPTII,,, | Performed by: ORTHOPAEDIC SURGERY

## 2023-05-11 PROCEDURE — 3008F BODY MASS INDEX DOCD: CPT | Mod: CPTII,,, | Performed by: ORTHOPAEDIC SURGERY

## 2023-05-11 PROCEDURE — 99213 OFFICE O/P EST LOW 20 MIN: CPT | Mod: ,,, | Performed by: ORTHOPAEDIC SURGERY

## 2023-05-11 PROCEDURE — 3008F PR BODY MASS INDEX (BMI) DOCUMENTED: ICD-10-PCS | Mod: CPTII,,, | Performed by: ORTHOPAEDIC SURGERY

## 2023-05-11 PROCEDURE — 1160F RVW MEDS BY RX/DR IN RCRD: CPT | Mod: CPTII,,, | Performed by: ORTHOPAEDIC SURGERY

## 2023-05-11 PROCEDURE — 3079F PR MOST RECENT DIASTOLIC BLOOD PRESSURE 80-89 MM HG: ICD-10-PCS | Mod: CPTII,,, | Performed by: ORTHOPAEDIC SURGERY

## 2023-05-11 PROCEDURE — 1159F PR MEDICATION LIST DOCUMENTED IN MEDICAL RECORD: ICD-10-PCS | Mod: CPTII,,, | Performed by: ORTHOPAEDIC SURGERY

## 2023-05-11 NOTE — PROGRESS NOTES
"Subjective:    CC: Follow-up (R knee had inject 2/6/23 with relief. states it started to wear off about a month ago. states knee brace does help. would like to discuss other options other then injection. )       HPI:  Patient returns today for repeat exam.  Patient continues to have worsening pain about her right knee especially long standing walking bending.  She is ready to proceed with her knee replacement, though has bone grafting and dental implants set up in July, and states it takes approximately 4 months to heal.  He continues to use her brace, she denies any other complaints    ROS: Refer to HPI for pertinent ROS. All other 12 point systems negative.    Objective:  Vitals:    05/11/23 0913   BP: 130/88   Pulse: 82   Weight: 90.7 kg (200 lb)   Height: 5' 4" (1.626 m)        Physical Exam:  The patient is well-nourished, well-developed and in no apparent distress, pleasant and cooperative. Examination of the right lower extremity compartments are soft and warm. Skin is intact. There are no signs or symptoms of DVT or infection. There is no obvious joint effusion. There is no erythema. Tender to palpation along the medial joint line and patellofemoral joint , right knee range of motion is 5-85. The knee is stable to exam with varus and valgus stressing. Negative anterior and posterior drawer. Negative Lachman´s.  Negative Pepe's test. Patella grind is positive, Negative for apprehension. Neurovascularly intact distally.    Images: . Images Reviewed and discussed with patient.    Assessment:  1. Primary osteoarthritis of right knee        Plan:  At this time we discussed her physical exam and previous imaging findings.  She will continue low-impact activities, knee brace as needed.  We have discussed a total knee arthroplasty when she is medically cleared from her bone grafting and dental implants.  I would like see her back around November if not sooner when she has been cleared.    Follow UP: No " follow-ups on file.

## 2023-06-23 ENCOUNTER — HOSPITAL ENCOUNTER (OUTPATIENT)
Dept: RADIOLOGY | Facility: HOSPITAL | Age: 63
Discharge: HOME | End: 2023-06-23
Attending: INTERNAL MEDICINE
Payer: COMMERCIAL

## 2023-06-23 DIAGNOSIS — M85.80 OSTEOPENIA, UNSPECIFIED LOCATION: ICD-10-CM

## 2023-06-23 DIAGNOSIS — Z79.811 LONG TERM (CURRENT) USE OF AROMATASE INHIBITORS: ICD-10-CM

## 2023-06-23 PROCEDURE — 77080 DXA BONE DENSITY AXIAL: CPT | Mod: TC

## 2023-06-23 PROCEDURE — 77080 DXA BONE DENSITY AXIAL: CPT | Mod: 26,,, | Performed by: STUDENT IN AN ORGANIZED HEALTH CARE EDUCATION/TRAINING PROGRAM

## 2023-06-23 PROCEDURE — 77080 DEXA BONE DENSITY SPINE HIP: ICD-10-PCS | Mod: 26,,, | Performed by: STUDENT IN AN ORGANIZED HEALTH CARE EDUCATION/TRAINING PROGRAM

## 2023-06-26 ENCOUNTER — PATIENT MESSAGE (OUTPATIENT)
Dept: INTERNAL MEDICINE | Facility: CLINIC | Age: 63
End: 2023-06-26
Payer: COMMERCIAL

## 2023-06-26 NOTE — PROGRESS NOTES
DEXA Results: Please inform patient of DEXA results, which show OSTEOPENIA. This is a mild thinning of the bone. Encourage Calcium 600mg + Vitamin D 800units BID plus weight bearing exercise at least 3 days per week. Repeat in 2 years.

## 2023-07-19 ENCOUNTER — OFFICE VISIT (OUTPATIENT)
Dept: ORTHOPEDICS | Facility: CLINIC | Age: 63
End: 2023-07-19
Payer: COMMERCIAL

## 2023-07-19 VITALS — HEIGHT: 64 IN | BODY MASS INDEX: 34.13 KG/M2 | WEIGHT: 199.94 LBS

## 2023-07-19 DIAGNOSIS — M77.51 RIGHT ANKLE TENDONITIS: ICD-10-CM

## 2023-07-19 DIAGNOSIS — M25.571 RIGHT ANKLE PAIN, UNSPECIFIED CHRONICITY: Primary | ICD-10-CM

## 2023-07-19 PROCEDURE — 1101F PR PT FALLS ASSESS DOC 0-1 FALLS W/OUT INJ PAST YR: ICD-10-PCS | Mod: CPTII,,, | Performed by: ORTHOPAEDIC SURGERY

## 2023-07-19 PROCEDURE — 1159F MED LIST DOCD IN RCRD: CPT | Mod: CPTII,,, | Performed by: ORTHOPAEDIC SURGERY

## 2023-07-19 PROCEDURE — 1101F PT FALLS ASSESS-DOCD LE1/YR: CPT | Mod: CPTII,,, | Performed by: ORTHOPAEDIC SURGERY

## 2023-07-19 PROCEDURE — 99214 PR OFFICE/OUTPT VISIT, EST, LEVL IV, 30-39 MIN: ICD-10-PCS | Mod: ,,, | Performed by: ORTHOPAEDIC SURGERY

## 2023-07-19 PROCEDURE — 3008F BODY MASS INDEX DOCD: CPT | Mod: CPTII,,, | Performed by: ORTHOPAEDIC SURGERY

## 2023-07-19 PROCEDURE — 3008F PR BODY MASS INDEX (BMI) DOCUMENTED: ICD-10-PCS | Mod: CPTII,,, | Performed by: ORTHOPAEDIC SURGERY

## 2023-07-19 PROCEDURE — 1160F RVW MEDS BY RX/DR IN RCRD: CPT | Mod: CPTII,,, | Performed by: ORTHOPAEDIC SURGERY

## 2023-07-19 PROCEDURE — 3288F PR FALLS RISK ASSESSMENT DOCUMENTED: ICD-10-PCS | Mod: CPTII,,, | Performed by: ORTHOPAEDIC SURGERY

## 2023-07-19 PROCEDURE — 1159F PR MEDICATION LIST DOCUMENTED IN MEDICAL RECORD: ICD-10-PCS | Mod: CPTII,,, | Performed by: ORTHOPAEDIC SURGERY

## 2023-07-19 PROCEDURE — 3288F FALL RISK ASSESSMENT DOCD: CPT | Mod: CPTII,,, | Performed by: ORTHOPAEDIC SURGERY

## 2023-07-19 PROCEDURE — 99214 OFFICE O/P EST MOD 30 MIN: CPT | Mod: ,,, | Performed by: ORTHOPAEDIC SURGERY

## 2023-07-19 PROCEDURE — 1160F PR REVIEW ALL MEDS BY PRESCRIBER/CLIN PHARMACIST DOCUMENTED: ICD-10-PCS | Mod: CPTII,,, | Performed by: ORTHOPAEDIC SURGERY

## 2023-07-19 NOTE — PROGRESS NOTES
"Subjective:    CC: Pain of the Right Ankle and Ankle Pain (Pt is here with pain on top of her right foot, been dealing with this for a couple weeks gradually getting worse, no prior sx or injury, otc for pain prn, xr today)       HPI:  Patient comes in today complaining of right ankle pain.  Patient states over the last couple of weeks she is noted increasing pain in her right foot when she takes off her shoe.  She denies any trauma numbness or tingling she denies other complaints.    ROS: Refer to HPI for pertinent ROS. All other 12 point systems negative.    Objective:  Vitals:    07/19/23 1429   Weight: 90.7 kg (199 lb 15.3 oz)   Height: 5' 4" (1.626 m)        Physical Exam:  Right foot compartment soft and warm.  Skin is intact.  There is no signs symptoms of DVT or infection.  She is tender along the dorsal aspect of the right foot and ankle.  There is no joint effusion there is no swelling or erythema.  She does have pain with resisted dorsiflexion of the ankle joint.  She is tender along the extensor tendon stable to stressing, neurovascular intact distally.    Images:  X-rays three views right foot demonstrate no obvious fracture or dislocation. Images Reviewed and discussed with patient.    Assessment:  1. Right ankle pain, unspecified chronicity  - X-Ray Ankle Complete Right; Future    2. Right ankle tendonitis        Plan:  At this time we discussed her physical exam and x-ray findings.  We have discussed anti-inflammatories with appropriate precautions, appropriate shoe wear, Ace wrap as needed.  She does have some tendonitis.  I would like see back with any problems or difficulties.    Follow UP: No follow-ups on file.              "

## 2023-07-25 NOTE — PROGRESS NOTES
Heme/Onc Progress Note    PATIENT: Letha Campbell  MRN: [id number]  DATE: 7/27/2023  Chief Complaint: Fatigue    Current Treatment: Femara    Oncology History   Malignant neoplasm of female breast   3/23/2021 Cancer Staged    Staging form: Breast, AJCC 8th Edition  - Pathologic stage from 3/23/2021: Stage IA (pT1a, pN0, cM0, G2, ER+, NJ+, HER2-)     5/6/2021 Surgery    Left breast lumpectomy sentinel lymph node biopsy  Pathology: Well differentiated tumor grade 1, tumor size 5 mm, all margins negative, DCIS, deep margin reresection negative, 1 sentinel lymph node negative for malignancy, pathological staging T1 a N0 M0  Dr. Chairez      - 7/2021 Radiation Therapy    Completed XRT     6/2/2021 -  Hormone Therapy    Femara         07/27/2023  6m follow up. Repeat bone density in June showed some worsening osteopenia  We discussed importance of calcium and vitamin D. She will have some dental work in the near future and will likely start Prolia. She is compliant with her AI. She does have hot flashes and fatigue. She has some arthritic pain in her knees and plans knee replacement.   Past Medical History:   Diagnosis Date    Carcinoma of upper-inner quadrant of left female breast     GERD (gastroesophageal reflux disease)     High cholesterol     Hypothyroidism, unspecified     Obesity, unspecified     Personal history of colonic polyps     S/P radiation therapy         Current Outpatient Medications:     azelastine (ASTELIN) 137 mcg (0.1 %) nasal spray, , Disp: , Rfl:     calcium carbonate/vitamin D3 (CALCIUM WITH VITAMIN D ORAL),  0 Refill(s), Disp: , Rfl:     coenzyme Q10 100 mg capsule, Take 100 mg by mouth., Disp: , Rfl:     EPINEPHrine (EPIPEN) 0.3 mg/0.3 mL AtIn, Inject into the muscle., Disp: , Rfl:     hydrocortisone 2.5 % cream, Apply topically., Disp: , Rfl:     ibuprofen-famotidine (DUEXIS) 800-26.6 mg Tab, Take by mouth., Disp: , Rfl:     letrozole (FEMARA) 2.5 mg Tab, TAKE ONE TABLET BY MOUTH  EVERY DAY, Disp: 90 tablet, Rfl: 1    levothyroxine (SYNTHROID) 88 MCG tablet, Take 1 tablet (88 mcg total) by mouth before breakfast., Disp: 90 tablet, Rfl: 3    linaCLOtide (LINZESS) 145 mcg Cap capsule, Take 1 capsule (145 mcg total) by mouth before breakfast. PRN constipation, Disp: 90 capsule, Rfl: 4    multivitamin-minerals-lutein Tab, Take 1 tablet by mouth Daily., Disp: , Rfl:     rosuvastatin (CRESTOR) 20 MG tablet, Take 20 mg by mouth., Disp: , Rfl:      Review of Systems:   Review of Systems   Constitutional:  Negative for appetite change and unexpected weight change.   HENT:  Negative for mouth sores.    Eyes:  Negative for visual disturbance.   Respiratory:  Negative for cough and shortness of breath.    Cardiovascular:  Negative for chest pain.   Gastrointestinal:  Negative for abdominal pain and diarrhea.   Genitourinary:  Negative for frequency.   Musculoskeletal:  Negative for back pain.   Skin:  Negative for rash.   Neurological:  Negative for headaches.   Hematological:  Negative for adenopathy.   Psychiatric/Behavioral:  The patient is not nervous/anxious.         Objective:     Vitals:    07/27/23 1110   BP: 122/84   Pulse: 61   Temp: 97.9 °F (36.6 °C)         Physical Exam  Constitutional:       Appearance: Normal appearance.   HENT:      Head: Normocephalic and atraumatic.      Nose: Nose normal.      Mouth/Throat:      Mouth: Mucous membranes are moist.   Eyes:      Extraocular Movements: Extraocular movements intact.      Conjunctiva/sclera: Conjunctivae normal.      Pupils: Pupils are equal, round, and reactive to light.   Cardiovascular:      Rate and Rhythm: Normal rate and regular rhythm.      Pulses: Normal pulses.   Pulmonary:      Effort: Pulmonary effort is normal.      Breath sounds: Normal breath sounds.   Chest:   Breasts:     Left: No bleeding.      Comments: Recent CBE with Petra at Dr. Lester office  Abdominal:      General: Bowel sounds are normal.      Palpations: Abdomen  [FreeTextEntry1] : Patient is 27 years old para 0-0-0-0 last menstrual period April 6, 2023.\par She is doing well on oral contraceptives in the form of Cryselle and notes improvement of painful and heavy menstrual periods on oral contraceptives.  She would like to continue with present oral contraceptives is soft.   Musculoskeletal:      Cervical back: Normal range of motion and neck supple.   Neurological:      General: No focal deficit present.      Mental Status: She is alert and oriented to person, place, and time. Mental status is at baseline.   Psychiatric:         Mood and Affect: Mood normal.         Behavior: Behavior normal.       ECOG SCORE            Assessment and Plan   Stage IA breast cancer  Osteopenia  Long term use of aromatase inhibitor       PLAN:  Continue Femara X 5 years (until 6/2026)  Consider prolia after she completes dental work  Repeat B/L Mg 4/2024, alternating with MRI per Dr. Chairez  Repeat Bone Density 6/2025  Calcium + Vitamin D BID      Follow up in about 6 months (around 1/27/2024), or OV with Nivia - no labs.

## 2023-07-27 ENCOUNTER — OFFICE VISIT (OUTPATIENT)
Dept: HEMATOLOGY/ONCOLOGY | Facility: CLINIC | Age: 63
End: 2023-07-27
Payer: COMMERCIAL

## 2023-07-27 VITALS
TEMPERATURE: 98 F | HEIGHT: 64 IN | SYSTOLIC BLOOD PRESSURE: 122 MMHG | WEIGHT: 206.38 LBS | HEART RATE: 61 BPM | DIASTOLIC BLOOD PRESSURE: 84 MMHG | BODY MASS INDEX: 35.23 KG/M2 | OXYGEN SATURATION: 97 %

## 2023-07-27 DIAGNOSIS — M85.80 OSTEOPENIA, UNSPECIFIED LOCATION: ICD-10-CM

## 2023-07-27 DIAGNOSIS — C50.912 MALIGNANT NEOPLASM OF LEFT BREAST IN FEMALE, ESTROGEN RECEPTOR POSITIVE, UNSPECIFIED SITE OF BREAST: Primary | ICD-10-CM

## 2023-07-27 DIAGNOSIS — Z17.0 MALIGNANT NEOPLASM OF LEFT BREAST IN FEMALE, ESTROGEN RECEPTOR POSITIVE, UNSPECIFIED SITE OF BREAST: Primary | ICD-10-CM

## 2023-07-27 DIAGNOSIS — E53.8 LOW SERUM VITAMIN B12: ICD-10-CM

## 2023-07-27 DIAGNOSIS — Z79.811 LONG TERM (CURRENT) USE OF AROMATASE INHIBITORS: ICD-10-CM

## 2023-07-27 PROCEDURE — 1160F RVW MEDS BY RX/DR IN RCRD: CPT | Mod: CPTII,S$GLB,, | Performed by: NURSE PRACTITIONER

## 2023-07-27 PROCEDURE — 3079F PR MOST RECENT DIASTOLIC BLOOD PRESSURE 80-89 MM HG: ICD-10-PCS | Mod: CPTII,S$GLB,, | Performed by: NURSE PRACTITIONER

## 2023-07-27 PROCEDURE — 3288F FALL RISK ASSESSMENT DOCD: CPT | Mod: CPTII,S$GLB,, | Performed by: NURSE PRACTITIONER

## 2023-07-27 PROCEDURE — 1101F PR PT FALLS ASSESS DOC 0-1 FALLS W/OUT INJ PAST YR: ICD-10-PCS | Mod: CPTII,S$GLB,, | Performed by: NURSE PRACTITIONER

## 2023-07-27 PROCEDURE — 1159F PR MEDICATION LIST DOCUMENTED IN MEDICAL RECORD: ICD-10-PCS | Mod: CPTII,S$GLB,, | Performed by: NURSE PRACTITIONER

## 2023-07-27 PROCEDURE — 99999 PR PBB SHADOW E&M-EST. PATIENT-LVL IV: ICD-10-PCS | Mod: PBBFAC,,, | Performed by: NURSE PRACTITIONER

## 2023-07-27 PROCEDURE — 3079F DIAST BP 80-89 MM HG: CPT | Mod: CPTII,S$GLB,, | Performed by: NURSE PRACTITIONER

## 2023-07-27 PROCEDURE — 3008F PR BODY MASS INDEX (BMI) DOCUMENTED: ICD-10-PCS | Mod: CPTII,S$GLB,, | Performed by: NURSE PRACTITIONER

## 2023-07-27 PROCEDURE — 1101F PT FALLS ASSESS-DOCD LE1/YR: CPT | Mod: CPTII,S$GLB,, | Performed by: NURSE PRACTITIONER

## 2023-07-27 PROCEDURE — 1159F MED LIST DOCD IN RCRD: CPT | Mod: CPTII,S$GLB,, | Performed by: NURSE PRACTITIONER

## 2023-07-27 PROCEDURE — 99214 OFFICE O/P EST MOD 30 MIN: CPT | Mod: S$GLB,,, | Performed by: NURSE PRACTITIONER

## 2023-07-27 PROCEDURE — 99999 PR PBB SHADOW E&M-EST. PATIENT-LVL IV: CPT | Mod: PBBFAC,,, | Performed by: NURSE PRACTITIONER

## 2023-07-27 PROCEDURE — 3044F HG A1C LEVEL LT 7.0%: CPT | Mod: CPTII,S$GLB,, | Performed by: NURSE PRACTITIONER

## 2023-07-27 PROCEDURE — 3288F PR FALLS RISK ASSESSMENT DOCUMENTED: ICD-10-PCS | Mod: CPTII,S$GLB,, | Performed by: NURSE PRACTITIONER

## 2023-07-27 PROCEDURE — 3074F PR MOST RECENT SYSTOLIC BLOOD PRESSURE < 130 MM HG: ICD-10-PCS | Mod: CPTII,S$GLB,, | Performed by: NURSE PRACTITIONER

## 2023-07-27 PROCEDURE — 3008F BODY MASS INDEX DOCD: CPT | Mod: CPTII,S$GLB,, | Performed by: NURSE PRACTITIONER

## 2023-07-27 PROCEDURE — 99214 PR OFFICE/OUTPT VISIT, EST, LEVL IV, 30-39 MIN: ICD-10-PCS | Mod: S$GLB,,, | Performed by: NURSE PRACTITIONER

## 2023-07-27 PROCEDURE — 3074F SYST BP LT 130 MM HG: CPT | Mod: CPTII,S$GLB,, | Performed by: NURSE PRACTITIONER

## 2023-07-27 PROCEDURE — 3044F PR MOST RECENT HEMOGLOBIN A1C LEVEL <7.0%: ICD-10-PCS | Mod: CPTII,S$GLB,, | Performed by: NURSE PRACTITIONER

## 2023-07-27 PROCEDURE — 1160F PR REVIEW ALL MEDS BY PRESCRIBER/CLIN PHARMACIST DOCUMENTED: ICD-10-PCS | Mod: CPTII,S$GLB,, | Performed by: NURSE PRACTITIONER

## 2023-08-03 ENCOUNTER — OFFICE VISIT (OUTPATIENT)
Dept: INTERNAL MEDICINE | Facility: CLINIC | Age: 63
End: 2023-08-03
Payer: COMMERCIAL

## 2023-08-03 VITALS
WEIGHT: 206 LBS | HEART RATE: 72 BPM | HEIGHT: 64 IN | BODY MASS INDEX: 35.17 KG/M2 | RESPIRATION RATE: 16 BRPM | TEMPERATURE: 97 F | SYSTOLIC BLOOD PRESSURE: 120 MMHG | OXYGEN SATURATION: 99 % | DIASTOLIC BLOOD PRESSURE: 78 MMHG

## 2023-08-03 DIAGNOSIS — E66.01 MORBID (SEVERE) OBESITY DUE TO EXCESS CALORIES: ICD-10-CM

## 2023-08-03 DIAGNOSIS — Z71.3 WEIGHT LOSS COUNSELING, ENCOUNTER FOR: ICD-10-CM

## 2023-08-03 DIAGNOSIS — Z00.00 ANNUAL PHYSICAL EXAM: Primary | ICD-10-CM

## 2023-08-03 DIAGNOSIS — F43.9 STRESS: ICD-10-CM

## 2023-08-03 DIAGNOSIS — I83.90 VARICOSE VEINS OF LOWER EXTREMITY, UNSPECIFIED LATERALITY, UNSPECIFIED WHETHER COMPLICATED: ICD-10-CM

## 2023-08-03 PROCEDURE — 3288F PR FALLS RISK ASSESSMENT DOCUMENTED: ICD-10-PCS | Mod: CPTII,,, | Performed by: INTERNAL MEDICINE

## 2023-08-03 PROCEDURE — 3044F PR MOST RECENT HEMOGLOBIN A1C LEVEL <7.0%: ICD-10-PCS | Mod: CPTII,,, | Performed by: INTERNAL MEDICINE

## 2023-08-03 PROCEDURE — 1159F PR MEDICATION LIST DOCUMENTED IN MEDICAL RECORD: ICD-10-PCS | Mod: CPTII,,, | Performed by: INTERNAL MEDICINE

## 2023-08-03 PROCEDURE — 1101F PT FALLS ASSESS-DOCD LE1/YR: CPT | Mod: CPTII,,, | Performed by: INTERNAL MEDICINE

## 2023-08-03 PROCEDURE — 3044F HG A1C LEVEL LT 7.0%: CPT | Mod: CPTII,,, | Performed by: INTERNAL MEDICINE

## 2023-08-03 PROCEDURE — 3008F PR BODY MASS INDEX (BMI) DOCUMENTED: ICD-10-PCS | Mod: CPTII,,, | Performed by: INTERNAL MEDICINE

## 2023-08-03 PROCEDURE — 99214 OFFICE O/P EST MOD 30 MIN: CPT | Mod: ,,, | Performed by: INTERNAL MEDICINE

## 2023-08-03 PROCEDURE — 3078F PR MOST RECENT DIASTOLIC BLOOD PRESSURE < 80 MM HG: ICD-10-PCS | Mod: CPTII,,, | Performed by: INTERNAL MEDICINE

## 2023-08-03 PROCEDURE — 3288F FALL RISK ASSESSMENT DOCD: CPT | Mod: CPTII,,, | Performed by: INTERNAL MEDICINE

## 2023-08-03 PROCEDURE — 3074F SYST BP LT 130 MM HG: CPT | Mod: CPTII,,, | Performed by: INTERNAL MEDICINE

## 2023-08-03 PROCEDURE — 1101F PR PT FALLS ASSESS DOC 0-1 FALLS W/OUT INJ PAST YR: ICD-10-PCS | Mod: CPTII,,, | Performed by: INTERNAL MEDICINE

## 2023-08-03 PROCEDURE — 1159F MED LIST DOCD IN RCRD: CPT | Mod: CPTII,,, | Performed by: INTERNAL MEDICINE

## 2023-08-03 PROCEDURE — 99214 PR OFFICE/OUTPT VISIT, EST, LEVL IV, 30-39 MIN: ICD-10-PCS | Mod: ,,, | Performed by: INTERNAL MEDICINE

## 2023-08-03 PROCEDURE — 3078F DIAST BP <80 MM HG: CPT | Mod: CPTII,,, | Performed by: INTERNAL MEDICINE

## 2023-08-03 PROCEDURE — 3008F BODY MASS INDEX DOCD: CPT | Mod: CPTII,,, | Performed by: INTERNAL MEDICINE

## 2023-08-03 PROCEDURE — 3074F PR MOST RECENT SYSTOLIC BLOOD PRESSURE < 130 MM HG: ICD-10-PCS | Mod: CPTII,,, | Performed by: INTERNAL MEDICINE

## 2023-08-03 RX ORDER — SEMAGLUTIDE 0.25 MG/.5ML
0.25 INJECTION, SOLUTION SUBCUTANEOUS WEEKLY
Qty: 0.5 ML | Refills: 0 | Status: SHIPPED | OUTPATIENT
Start: 2023-08-03 | End: 2023-09-19

## 2023-08-03 NOTE — PROGRESS NOTES
Subjective:      Patient ID: Letha Campbell is a 63 y.o. female.  Chief Complaint: wellness    HPI:  63 year-old obese  female for wellness  [facility]; last scope was in 11/2021 with multiple polys  [facility]; Stage IA infiltrating ductal carcinoma of the left breast ; on Femara  Osteopenia; no need for bisphosphonate  Intake she states is minimal  However she is not very active because of her right knee pain.  She is taking care of her mother who is having hip back problems.  She has a large varicosity to her right leg as well, heavy with activity   Strongly advised patient to increase activity.    Daily log of food discussed; advice given   Not exercising  Not drinking enough water    Past Medical History:  Past Medical History:   Diagnosis Date    Carcinoma of upper-inner quadrant of left female breast     GERD (gastroesophageal reflux disease)     High cholesterol     Hypothyroidism, unspecified     Obesity, unspecified     Personal history of colonic polyps     S/P radiation therapy      Past Surgical History:   Procedure Laterality Date    BLADDER AUGMENTATION      BREAST BIOPSY  03/23/2021    BREAST LUMPECTOMY      COLONOSCOPY  07/01/2019    COLONOSCOPY  11/22/2021    JOINT REPLACEMENT  2/2020    LIPOMA RESECTION      TONSILLECTOMY  1961    TONSILLECTOMY AND ADENOIDECTOMY      TOTAL KNEE ARTHROPLASTY Left     JUDE KELBY    TUBAL LIGATION       Review of patient's allergies indicates:  No Known Allergies  Current Outpatient Medications on File Prior to Visit   Medication Sig Dispense Refill    azelastine (ASTELIN) 137 mcg (0.1 %) nasal spray       calcium carbonate/vitamin D3 (CALCIUM WITH VITAMIN D ORAL)   0 Refill(s)      coenzyme Q10 100 mg capsule Take 100 mg by mouth.      EPINEPHrine (EPIPEN) 0.3 mg/0.3 mL AtIn Inject into the muscle.      hydrocortisone 2.5 % cream Apply topically.      letrozole (FEMARA) 2.5 mg Tab TAKE ONE TABLET BY MOUTH EVERY DAY 90 tablet 1    levothyroxine  "(SYNTHROID) 88 MCG tablet Take 1 tablet (88 mcg total) by mouth before breakfast. 90 tablet 3    linaCLOtide (LINZESS) 145 mcg Cap capsule Take 1 capsule (145 mcg total) by mouth before breakfast. PRN constipation 90 capsule 4    multivitamin-minerals-lutein Tab Take 1 tablet by mouth Daily.      rosuvastatin (CRESTOR) 20 MG tablet Take 20 mg by mouth.       No current facility-administered medications on file prior to visit.     Social History     Socioeconomic History    Marital status:    Tobacco Use    Smoking status: Never    Smokeless tobacco: Never   Substance and Sexual Activity    Alcohol use: Yes     Comment: Occassionally    Drug use: Never    Sexual activity: Not Currently     Family History   Problem Relation Age of Onset    Hyperlipidemia Mother     Arthritis Mother     Diabetes Mother     Hypertension Father     Diabetes Father     Arthritis Father     Prostate cancer Father     Cancer Father         Prostate Cancer       Review of Systems  A comprehensive review of systems was performed and was negative with exception of what is documented above.     Objective:   /78 (BP Location: Right arm, Patient Position: Sitting, BP Method: Medium (Manual))   Pulse 72   Temp 97.3 °F (36.3 °C) (Temporal)   Resp 16   Ht 5' 4" (1.626 m)   Wt 93.4 kg (206 lb)   SpO2 99%   BMI 35.36 kg/m²   Physical Exam  General : Alert and oriented, No acute distress, afebrile. Obese  Eye : PERRLA. EOMI. Normal conjunctiva, Sclerae are nonicteric.   Respiratory : Respirations are non-labored and clear to auscultation bilaterally. Symmetrical air entry bilaterally, no crackles, no wheezes, no rhonchi. No cyanosis, no clubbing.  Cardiovascular : Normal rate, Regular rhythm. No murmurs, rubs, or gallops. Pulses are 2+ throughout. No JVD. No Edema. Large varicosity LLE  Gastrointestinal : Soft, nontender, non-distended, bowel sounds are present in all quadrants, no organomegaly, no guarding, no " rebound.  Musculoskeletal : Normal range of motion throughout. No muscle tenderness.  Integumentary : Warm, moist, intact.  Neurologic : Alert, Oriented  Psychiatric : Cooperative, Appropriate mood & affect.   Assessment/ Plan:   1. Annual physical exam    2. Varicose veins of lower extremity, unspecified laterality, unspecified whether complicated  -     Ambulatory referral/consult to Vascular Surgery; Future; Expected date: 08/10/2023    3. Weight loss counseling, encounter for  Assessment & Plan:  RX Wegovy  RTC 4 weeks    Body mass index is 35.36 kg/m².  Goal BMI <30.  Exercise 5 times a week for 30 minutes per day.  Avoid soda, simple sugars, excessive rice, potatoes or bread. Limit fast foods and fried foods.  Choose complex carbs in moderation (example: green vegetables, beans, oatmeal). Eat plenty of fresh fruits and vegetables with lean meats daily.  Do not skip meals. Eat a balanced portion size.  Avoid fad diets. Consider permanent healthy life style changes.       4. Stress  Assessment & Plan:  Ashwaganda gummies 2 at bedtime      5. Morbid (severe) obesity due to excess calories  Assessment & Plan:  Body mass index is 35.36 kg/m².  Goal BMI <30.  Exercise 5 times a week for 30 minutes per day.  Avoid soda, simple sugars, excessive rice, potatoes or bread. Limit fast foods and fried foods.  Choose complex carbs in moderation (example: green vegetables, beans, oatmeal). Eat plenty of fresh fruits and vegetables with lean meats daily.  Do not skip meals. Eat a balanced portion size.  Avoid fad diets. Consider permanent healthy life style changes.       Other orders  -     semaglutide, weight loss, (WEGOVY) 0.25 mg/0.5 mL PnIj; Inject 0.25 mg into the skin once a week. Call MD after 4 weeks to increase the dose  Dispense: 0.5 mL; Refill: 0             Follow up in about 4 weeks (around 8/31/2023) for WEIGHT LOSS REVISIT.

## 2023-08-03 NOTE — ASSESSMENT & PLAN NOTE
RX Wegovy  RTC 4 weeks    Body mass index is 35.36 kg/m².  Goal BMI <30.  Exercise 5 times a week for 30 minutes per day.  Avoid soda, simple sugars, excessive rice, potatoes or bread. Limit fast foods and fried foods.  Choose complex carbs in moderation (example: green vegetables, beans, oatmeal). Eat plenty of fresh fruits and vegetables with lean meats daily.  Do not skip meals. Eat a balanced portion size.  Avoid fad diets. Consider permanent healthy life style changes.

## 2023-08-04 ENCOUNTER — PATIENT MESSAGE (OUTPATIENT)
Dept: INTERNAL MEDICINE | Facility: CLINIC | Age: 63
End: 2023-08-04
Payer: COMMERCIAL

## 2023-08-04 ENCOUNTER — TELEPHONE (OUTPATIENT)
Dept: INTERNAL MEDICINE | Facility: CLINIC | Age: 63
End: 2023-08-04
Payer: COMMERCIAL

## 2023-08-04 NOTE — TELEPHONE ENCOUNTER
----- Message from Jasper Hagan MD sent at 8/4/2023  8:03 AM CDT -----  Regarding: RE:  Please inform patient  Babe her call her insurance and see if she has any obesity meds on her plan and please get us a list  ----- Message -----  From: Juan Daniel Freire MA  Sent: 8/3/2023   4:15 PM CDT  To: Jasper Hagan MD    PA denied for Wegovy. Plan exclusion

## 2023-08-04 NOTE — TELEPHONE ENCOUNTER
Spoke to pt. Pt Verbally confirmed understanding.  She stated that she will call her insurance and let us know what they will allow.

## 2023-08-09 ENCOUNTER — PATIENT MESSAGE (OUTPATIENT)
Dept: INTERNAL MEDICINE | Facility: CLINIC | Age: 63
End: 2023-08-09
Payer: COMMERCIAL

## 2023-08-09 ENCOUNTER — TELEPHONE (OUTPATIENT)
Dept: INTERNAL MEDICINE | Facility: CLINIC | Age: 63
End: 2023-08-09
Payer: COMMERCIAL

## 2023-08-09 PROBLEM — Z00.00 ANNUAL PHYSICAL EXAM: Status: ACTIVE | Noted: 2023-08-09

## 2023-08-09 NOTE — TELEPHONE ENCOUNTER
----- Message from Jasper Hagan MD sent at 8/9/2023  3:19 PM CDT -----  done  ----- Message -----  From: Aleksandra Cleveland  Sent: 8/9/2023   3:03 PM CDT  To: Jasper Hagan MD    Pt came on 8.3.23 for an apt, and thought she could have had that apt for her Wellness also since her last Wellness was 11.18.2021.    Pt's Ins. Billed her b/c apt for 8.3.23 was coded as a Wt. Loss r/v. (Ins. won't pay)    Pt did Wellness labs before apt on 7.27.23, and wanted to know if we could code the visit on 8.3.23 as a Wellness

## 2023-08-09 NOTE — ASSESSMENT & PLAN NOTE

## 2023-08-24 ENCOUNTER — PATIENT MESSAGE (OUTPATIENT)
Dept: INTERNAL MEDICINE | Facility: CLINIC | Age: 63
End: 2023-08-24
Payer: MEDICARE

## 2023-09-08 RX ORDER — ACETAMINOPHEN AND CODEINE PHOSPHATE 300; 30 MG/1; MG/1
TABLET ORAL
COMMUNITY
Start: 2023-08-10

## 2023-09-08 RX ORDER — CHLORHEXIDINE GLUCONATE ORAL RINSE 1.2 MG/ML
SOLUTION DENTAL
COMMUNITY
Start: 2023-08-10 | End: 2023-12-12

## 2023-09-08 RX ORDER — ONDANSETRON 4 MG/1
4 TABLET, FILM COATED ORAL EVERY 8 HOURS PRN
COMMUNITY
Start: 2023-08-10 | End: 2023-12-12

## 2023-09-12 NOTE — PROGRESS NOTES
"    [facility] Vascular - Clinic Note  Rajendra Zarate MD      Patient Name: eLtha Campbell                   : 1960      MRN: [id number]   Visit Date: 2023       History Present Illness     Reason for Visit: Varicose Veins       Ms. Campbell presents to the clinic for evaluation of right leg varicose vein. She describes heaviness with activity. She reports varicose veins bilaterally, but more prominent in her right leg.   Here symptoms are more significant in the left leg with focal pain in the posterolateral popliteal area.  She reports intermittent heaviness bilaterally when she ambulates. She reports pain to her left knee on the back side of her leg. She states her symptoms are worse in the evening.  Her discomfort started after her knee replacement in .   The pain is focal to the site of her visible varicosity.   She denies compression stockings, but she reports recently ordering some.    She denies history of deep vein thrombosis or injury to her right leg. She reports pregnancies x 2.    REVIEW OF SYSTEMS:  ROS  12 point review of systems conducted, negative except as stated in the history of present illness. See HPI for details.        Physical Exam      Visit Vitals  /80 (BP Location: Right arm)   Pulse 90   Ht 5' 4" (1.626 m)   Wt 95.3 kg (210 lb)   BMI 36.05 kg/m²         General: well-nourished, no acute distress, and healthy appearing, ambulating normally, alert, pleasant, conversant, and oriented  Neurologic: cranial nerves are grossly intact, no neurologic deficits  HEENT: grossly normal and no scleral icterus  Neck/Chest: normal  and soft without lymphadenopathy  Respiratory: breathing easily and without respiratory distress  Abdomen: normal and soft  Cardiology: regular rate and rhythm    Upper Extremity Arterial Exam:   Right - radial is palpable  Left - radial is palpable    Lower Extremity Arterial Exam:  Right - dorsalis pedis is palpable  Left - dorsalis pedis is " palpable    Musculoskeletal:   Lower Extremity: right lower extremity has pitting edema and left lower extremity has pitting edema    Skin: has no obvious skin abnormality to lower extremities    Varicose Veins:  Right Lower Extremity - small varicosity to medial thigh, spider veins to lower leg  Left Lower Extremity - posterior/lateral leg at knee level that extends, spider veins to medial aspect of thigh and lower leg    Measurements: 13 inches (ankle), 19 inches (calf), 24.5 inches (thigh)                          Assessment and Plan     Ms. Campbell is a 63 y.o. woman with symptomatic varicose veins.  The most symptomatic site is associated with the varicosity that is posterolateral to the knee. Her knee pain may ultimately be related to previous knee surgery but that is hard to discern at this time. We discussed the basic concepts of venous disease.  Recommend obtaining venous duplex for evaluation of venous insufficiency. Measured her legs today with guidance on use of graduated compression stockings.  Advised ocassional breaks for leg elevation through the day (while in supine position).  Will have her follow up in 2 months for evaluation of her symptoms.         1. Varicose veins of both lower extremities with inflammation  - Ambulatory referral/consult to Vascular Surgery  - CV US Lower Extremity Veins Bilateral Insufficiency; Future           Imaging Obtained/Reviewed   Study: none  Date:           Medical History     Past Medical History:   Diagnosis Date    Carcinoma of upper-inner quadrant of left female breast     GERD (gastroesophageal reflux disease)     High cholesterol     Hypothyroidism, unspecified     Obesity, unspecified     Personal history of colonic polyps     S/P radiation therapy      Past Surgical History:   Procedure Laterality Date    BLADDER AUGMENTATION      BREAST BIOPSY  03/23/2021    BREAST LUMPECTOMY      COLONOSCOPY  07/01/2019    COLONOSCOPY  11/22/2021    JOINT REPLACEMENT  2/2020     LIPOMA RESECTION      TONSILLECTOMY  1961    TONSILLECTOMY AND ADENOIDECTOMY      TOTAL KNEE ARTHROPLASTY Left     JUDE LAMA    TUBAL LIGATION       Family History   Problem Relation Age of Onset    Hyperlipidemia Mother     Arthritis Mother     Diabetes Mother     Hypertension Father     Diabetes Father     Arthritis Father     Prostate cancer Father     Cancer Father         Prostate Cancer     Social History     Socioeconomic History    Marital status:    Tobacco Use    Smoking status: Never    Smokeless tobacco: Never   Substance and Sexual Activity    Alcohol use: Yes     Comment: Occassionally    Drug use: Never    Sexual activity: Not Currently     Current Outpatient Medications   Medication Instructions    acetaminophen-codeine 300-30mg (TYLENOL #3) 300-30 mg Tab Oral    azelastine (ASTELIN) 137 mcg (0.1 %) nasal spray No dose, route, or frequency recorded.    calcium carbonate/vitamin D3 (CALCIUM WITH VITAMIN D ORAL)   0 Refill(s)    chlorhexidine (PERIDEX) 0.12 % solution SMARTSIG:By Mouth    coenzyme Q10 100 mg, Oral    EPINEPHrine (EPIPEN) 0.3 mg/0.3 mL AtIn Intramuscular    hydrocortisone 2.5 % cream Topical    letrozole (FEMARA) 2.5 mg Tab TAKE ONE TABLET BY MOUTH EVERY DAY    levothyroxine (SYNTHROID) 88 mcg, Oral, Before breakfast    linaCLOtide (LINZESS) 145 mcg, Oral, Before breakfast, PRN constipation    multivitamin-minerals-lutein Tab 1 tablet, Oral, Daily    ondansetron (ZOFRAN) 4 mg, Oral, Every 8 hours PRN    rosuvastatin (CRESTOR) 20 mg, Oral     Review of patient's allergies indicates:  No Known Allergies    Patient Care Team:  Jasper Hagan MD as PCP - General (Internal Medicine)  Turner Bear MD as Consulting Physician (Gastroenterology)        No follow-ups on file. In addition to their scheduled follow up, the patient has also been instructed to follow up on as needed basis.     Future Appointments   Date Time Provider Department Center   10/19/2023  3:00 PM CV  [facility] VASCULAR SURGERY  [address] [facility]   11/28/2023 10:40 AM Rajendra Zarate MD [facility]   12/7/2023  9:15 AM Frank Marks MD [facility]   1/30/2024 11:00 AM N[facility] Macdonald FNP [facility]   8/8/2024 10:20 AM Jasper Hagan MD [place] [id number]

## 2023-09-19 ENCOUNTER — OFFICE VISIT (OUTPATIENT)
Dept: VASCULAR SURGERY | Facility: CLINIC | Age: 63
End: 2023-09-19
Payer: COMMERCIAL

## 2023-09-19 VITALS
HEIGHT: 64 IN | BODY MASS INDEX: 35.85 KG/M2 | DIASTOLIC BLOOD PRESSURE: 80 MMHG | WEIGHT: 210 LBS | HEART RATE: 90 BPM | SYSTOLIC BLOOD PRESSURE: 110 MMHG

## 2023-09-19 DIAGNOSIS — I83.11 VARICOSE VEINS OF BOTH LOWER EXTREMITIES WITH INFLAMMATION: ICD-10-CM

## 2023-09-19 DIAGNOSIS — I83.12 VARICOSE VEINS OF BOTH LOWER EXTREMITIES WITH INFLAMMATION: ICD-10-CM

## 2023-09-19 PROCEDURE — 1101F PT FALLS ASSESS-DOCD LE1/YR: CPT | Mod: CPTII,,, | Performed by: SPECIALIST

## 2023-09-19 PROCEDURE — 3008F PR BODY MASS INDEX (BMI) DOCUMENTED: ICD-10-PCS | Mod: CPTII,,, | Performed by: SPECIALIST

## 2023-09-19 PROCEDURE — 1160F RVW MEDS BY RX/DR IN RCRD: CPT | Mod: CPTII,,, | Performed by: SPECIALIST

## 2023-09-19 PROCEDURE — 3079F PR MOST RECENT DIASTOLIC BLOOD PRESSURE 80-89 MM HG: ICD-10-PCS | Mod: CPTII,,, | Performed by: SPECIALIST

## 2023-09-19 PROCEDURE — 3288F PR FALLS RISK ASSESSMENT DOCUMENTED: ICD-10-PCS | Mod: CPTII,,, | Performed by: SPECIALIST

## 2023-09-19 PROCEDURE — 1101F PR PT FALLS ASSESS DOC 0-1 FALLS W/OUT INJ PAST YR: ICD-10-PCS | Mod: CPTII,,, | Performed by: SPECIALIST

## 2023-09-19 PROCEDURE — 1160F PR REVIEW ALL MEDS BY PRESCRIBER/CLIN PHARMACIST DOCUMENTED: ICD-10-PCS | Mod: CPTII,,, | Performed by: SPECIALIST

## 2023-09-19 PROCEDURE — 3288F FALL RISK ASSESSMENT DOCD: CPT | Mod: CPTII,,, | Performed by: SPECIALIST

## 2023-09-19 PROCEDURE — 3008F BODY MASS INDEX DOCD: CPT | Mod: CPTII,,, | Performed by: SPECIALIST

## 2023-09-19 PROCEDURE — 3074F SYST BP LT 130 MM HG: CPT | Mod: CPTII,,, | Performed by: SPECIALIST

## 2023-09-19 PROCEDURE — 1159F MED LIST DOCD IN RCRD: CPT | Mod: CPTII,,, | Performed by: SPECIALIST

## 2023-09-19 PROCEDURE — 1159F PR MEDICATION LIST DOCUMENTED IN MEDICAL RECORD: ICD-10-PCS | Mod: CPTII,,, | Performed by: SPECIALIST

## 2023-09-19 PROCEDURE — 99203 PR OFFICE/OUTPT VISIT, NEW, LEVL III, 30-44 MIN: ICD-10-PCS | Mod: ,,, | Performed by: SPECIALIST

## 2023-09-19 PROCEDURE — 99203 OFFICE O/P NEW LOW 30 MIN: CPT | Mod: ,,, | Performed by: SPECIALIST

## 2023-09-19 PROCEDURE — 3074F PR MOST RECENT SYSTOLIC BLOOD PRESSURE < 130 MM HG: ICD-10-PCS | Mod: CPTII,,, | Performed by: SPECIALIST

## 2023-09-19 PROCEDURE — 3044F PR MOST RECENT HEMOGLOBIN A1C LEVEL <7.0%: ICD-10-PCS | Mod: CPTII,,, | Performed by: SPECIALIST

## 2023-09-19 PROCEDURE — 3079F DIAST BP 80-89 MM HG: CPT | Mod: CPTII,,, | Performed by: SPECIALIST

## 2023-09-19 PROCEDURE — 3044F HG A1C LEVEL LT 7.0%: CPT | Mod: CPTII,,, | Performed by: SPECIALIST

## 2023-10-09 ENCOUNTER — PATIENT MESSAGE (OUTPATIENT)
Dept: ORTHOPEDICS | Facility: CLINIC | Age: 63
End: 2023-10-09
Payer: MEDICARE

## 2023-10-09 ENCOUNTER — PATIENT MESSAGE (OUTPATIENT)
Dept: INTERNAL MEDICINE | Facility: CLINIC | Age: 63
End: 2023-10-09
Payer: MEDICARE

## 2023-10-10 ENCOUNTER — CLINICAL SUPPORT (OUTPATIENT)
Dept: INTERNAL MEDICINE | Facility: CLINIC | Age: 63
End: 2023-10-10
Payer: COMMERCIAL

## 2023-10-10 DIAGNOSIS — Z23 NEED FOR VACCINATION: Primary | ICD-10-CM

## 2023-10-10 PROCEDURE — 90471 FLU VACCINE - QUADRIVALENT - ADJUVANTED: ICD-10-PCS | Mod: ,,, | Performed by: INTERNAL MEDICINE

## 2023-10-10 PROCEDURE — 90471 IMMUNIZATION ADMIN: CPT | Mod: ,,, | Performed by: INTERNAL MEDICINE

## 2023-10-10 PROCEDURE — 90694 FLU VACCINE - QUADRIVALENT - ADJUVANTED: ICD-10-PCS | Mod: ,,, | Performed by: INTERNAL MEDICINE

## 2023-10-10 PROCEDURE — 90694 VACC AIIV4 NO PRSRV 0.5ML IM: CPT | Mod: ,,, | Performed by: INTERNAL MEDICINE

## 2023-10-10 NOTE — PROGRESS NOTES
Pt came into the office today for high dose flu.  Pt tolerated well.  Vaccine given in left  deltoid.

## 2023-10-16 ENCOUNTER — TELEPHONE (OUTPATIENT)
Dept: INTERNAL MEDICINE | Facility: CLINIC | Age: 63
End: 2023-10-16
Payer: MEDICARE

## 2023-10-16 DIAGNOSIS — K59.09 OTHER CONSTIPATION: Primary | ICD-10-CM

## 2023-10-16 NOTE — TELEPHONE ENCOUNTER
----- Message from Shruthi Carballo sent at 10/16/2023  7:33 AM CDT -----  Linzess 145 mcg cap  Qty 90  [facility] - [address] [facility] .

## 2023-10-26 LAB — BCS RECOMMENDATION EXT: NORMAL

## 2023-11-02 ENCOUNTER — PATIENT OUTREACH (OUTPATIENT)
Dept: ADMINISTRATIVE | Facility: HOSPITAL | Age: 63
End: 2023-11-02
Payer: MEDICARE

## 2023-11-02 NOTE — PROGRESS NOTES
Population Health Outreach.   The following record(s)  below were uploaded for Health Maintenance .    Breast MRI  10/26/23

## 2023-11-13 PROBLEM — Z00.00 ANNUAL PHYSICAL EXAM: Status: RESOLVED | Noted: 2023-08-09 | Resolved: 2023-11-13

## 2023-12-07 NOTE — PROGRESS NOTES
"    [facility] Vascular - Clinic Note  Rajendra Zarate MD      Patient Name: Letha Campbell                   : 1960      MRN: [id number]   Visit Date: 2023       History Present Illness     Reason for Visit: Varicose Veins    Ms. Campbell presents to the clinic for 3 month follow up evaluation of her lower extremity symptoms associated with her varicose veins. Venous duplex was obtained 10/19/23. She has attempted multiple forms of compression (knee high, thigh high and panty hose style).   She had some improvement when wearing compression but was unable to tolerate the compression stockings no matter what style she bought (she feels this was secondary to leg shape/size)  She notes no change in her symptoms to varicosities (pain, discomfort).  Also has some right knee pain.    Prev: most symptomatic site is at varicosity posterolateral to the L knee.     REVIEW OF SYSTEMS:  ROS  12 point review of systems conducted, negative except as stated in the history of present illness. See HPI for details.        Physical Exam      Vitals:    23 0823   BP: 122/85   BP Location: Right arm   Pulse: 71   Weight: 95.3 kg (210 lb)   Height: 5' 4" (1.626 m)          General: well-nourished, no acute distress, and healthy appearing, ambulating normally, alert, pleasant, conversant, and oriented  Neurologic: cranial nerves are grossly intact, no neurologic deficits, no motor deficits, and no sensory deficits  HEENT: grossly normal and no scleral icterus  Neck/Chest: normal   Respiratory: breathing easily and without respiratory distress  Cardiology: regular rate and rhythm    Upper Extremity Arterial Exam:   Right - radial is palpable    Musculoskeletal:   Lower Extremity: right lower extremity has pitting edema and left lower extremity has pitting edema    Skin: has no obvious skin abnormality to lower extremities    Varicose Veins:  Right Lower Extremity - small varicosity to medial thigh, spider veins to lower " leg, right posterolateral thigh/popliteal area with varicose vein network.  Left Lower Extremity - posterior/lateral leg at knee level that extends, spider veins to medial aspect of thigh and lower leg                          Assessment and Plan     Ms. Campbell is a 63 y.o. woman with symptomatic varicose veins with the most significant symptoms focal to the varicosity that is posterolateral to her left knee. Venous duplex obtained in October is negative for deep vein thrombosis or insufficiency.   She had some improvement with compression stocking use but couldn't tolerate them well because the the top band was too constrictive to tolerate.  She has tried multiple types of compression without finding a sustainable solution.  Discussed options of stab phlebectomies to the large varicosity of her left leg versus continued compression with a custom compression stocking. There are some areas bilaterally that would benefit from sclerotherapy. She will continue to monitor her symptoms and attempt custom compression.   She will contact us is she needs further assistance or decides to purse procedural intervention.       1. Varicose veins of left lower extremity with pain    2. Asymptomatic varicose veins of right lower extremity          Imaging Obtained/Reviewed   Study: bilateral lower extremity venous duplex  Date:   10/19/23        Medical History     Past Medical History:   Diagnosis Date    Arthritis     Asymptomatic varicose veins of unspecified lower extremity     Carcinoma of upper-inner quadrant of left female breast     GERD (gastroesophageal reflux disease)     High cholesterol     HLD (hyperlipidemia)     HTN (hypertension)     Hypothyroidism, unspecified     Obesity, unspecified     Personal history of colonic polyps     S/P radiation therapy     Shingles      Past Surgical History:   Procedure Laterality Date    BLADDER AUGMENTATION      BREAST BIOPSY  03/23/2021    BREAST LUMPECTOMY      COLONOSCOPY   07/01/2019    COLONOSCOPY  11/22/2021    LIPOMA RESECTION      SHOULDER    LIPOMA RESECTION      shoulder    TONSILLECTOMY  1961    TONSILLECTOMY AND ADENOIDECTOMY      TOTAL KNEE ARTHROPLASTY Left     JUDE KELBY    TUBAL LIGATION       Family History   Problem Relation Age of Onset    Hyperlipidemia Mother     Arthritis Mother     Diabetes Mother     Hypertension Father     Diabetes Father     Arthritis Father     Prostate cancer Father     Cancer Father         Prostate Cancer     Social History     Socioeconomic History    Marital status:    Tobacco Use    Smoking status: Never    Smokeless tobacco: Never   Substance and Sexual Activity    Alcohol use: Yes     Comment: Occassionally    Drug use: Never    Sexual activity: Not Currently     Current Outpatient Medications   Medication Instructions    acetaminophen-codeine 300-30mg (TYLENOL #3) 300-30 mg Tab Oral    azelastine (ASTELIN) 137 mcg (0.1 %) nasal spray No dose, route, or frequency recorded.    calcium carbonate/vitamin D3 (CALCIUM WITH VITAMIN D ORAL)   0 Refill(s)    coenzyme Q10 100 mg, Oral    EPINEPHrine (EPIPEN) 0.3 mg/0.3 mL AtIn Intramuscular    hydrocortisone 2.5 % cream Topical    letrozole (FEMARA) 2.5 mg Tab TAKE ONE TABLET BY MOUTH EVERY DAY    levothyroxine (SYNTHROID) 88 mcg, Oral, Before breakfast    linaCLOtide (LINZESS) 145 mcg, Oral, Before breakfast, PRN constipation    multivitamin-minerals-lutein Tab 1 tablet, Oral, Daily    rosuvastatin (CRESTOR) 20 mg, Oral     Review of patient's allergies indicates:  No Known Allergies    Patient Care Team:  Jasper Hagan MD as PCP - General (Internal Medicine)  Turner Bear MD as Consulting Physician (Gastroenterology)  [facility] [place] AL IV, MD as Consulting Physician (Surgical Oncology)  JOSE MANUEL Person Jr., MD as Consulting Physician (Gynecology)        No follow-ups on file. In addition to their scheduled follow up, the patient  has also been instructed to follow up on as needed basis.     Future Appointments   Date Time Provider Department Center   1/11/2024 10:15 AM Frank Marks MD [facility] [place]   1/30/2024 11:00 AM [facility]   8/8/2024 10:20 AM Jasper Hagan MD [id number]

## 2023-12-12 ENCOUNTER — OFFICE VISIT (OUTPATIENT)
Dept: VASCULAR SURGERY | Facility: CLINIC | Age: 63
End: 2023-12-12
Attending: SPECIALIST
Payer: COMMERCIAL

## 2023-12-12 VITALS
DIASTOLIC BLOOD PRESSURE: 85 MMHG | WEIGHT: 210 LBS | HEIGHT: 64 IN | SYSTOLIC BLOOD PRESSURE: 122 MMHG | BODY MASS INDEX: 35.85 KG/M2 | HEART RATE: 71 BPM

## 2023-12-12 DIAGNOSIS — I83.812 VARICOSE VEINS OF LEFT LOWER EXTREMITY WITH PAIN: Primary | ICD-10-CM

## 2023-12-12 DIAGNOSIS — I83.91 ASYMPTOMATIC VARICOSE VEINS OF RIGHT LOWER EXTREMITY: ICD-10-CM

## 2023-12-12 PROCEDURE — 99213 OFFICE O/P EST LOW 20 MIN: CPT | Mod: ,,, | Performed by: SPECIALIST

## 2023-12-12 PROCEDURE — 3079F DIAST BP 80-89 MM HG: CPT | Mod: CPTII,,, | Performed by: SPECIALIST

## 2023-12-12 PROCEDURE — 1160F RVW MEDS BY RX/DR IN RCRD: CPT | Mod: CPTII,,, | Performed by: SPECIALIST

## 2023-12-12 PROCEDURE — 3044F PR MOST RECENT HEMOGLOBIN A1C LEVEL <7.0%: ICD-10-PCS | Mod: CPTII,,, | Performed by: SPECIALIST

## 2023-12-12 PROCEDURE — 3288F FALL RISK ASSESSMENT DOCD: CPT | Mod: CPTII,,, | Performed by: SPECIALIST

## 2023-12-12 PROCEDURE — 3074F SYST BP LT 130 MM HG: CPT | Mod: CPTII,,, | Performed by: SPECIALIST

## 2023-12-12 PROCEDURE — 3288F PR FALLS RISK ASSESSMENT DOCUMENTED: ICD-10-PCS | Mod: CPTII,,, | Performed by: SPECIALIST

## 2023-12-12 PROCEDURE — 3008F PR BODY MASS INDEX (BMI) DOCUMENTED: ICD-10-PCS | Mod: CPTII,,, | Performed by: SPECIALIST

## 2023-12-12 PROCEDURE — 1101F PR PT FALLS ASSESS DOC 0-1 FALLS W/OUT INJ PAST YR: ICD-10-PCS | Mod: CPTII,,, | Performed by: SPECIALIST

## 2023-12-12 PROCEDURE — 3008F BODY MASS INDEX DOCD: CPT | Mod: CPTII,,, | Performed by: SPECIALIST

## 2023-12-12 PROCEDURE — 1160F PR REVIEW ALL MEDS BY PRESCRIBER/CLIN PHARMACIST DOCUMENTED: ICD-10-PCS | Mod: CPTII,,, | Performed by: SPECIALIST

## 2023-12-12 PROCEDURE — 3044F HG A1C LEVEL LT 7.0%: CPT | Mod: CPTII,,, | Performed by: SPECIALIST

## 2023-12-12 PROCEDURE — 99213 PR OFFICE/OUTPT VISIT, EST, LEVL III, 20-29 MIN: ICD-10-PCS | Mod: ,,, | Performed by: SPECIALIST

## 2023-12-12 PROCEDURE — 1159F PR MEDICATION LIST DOCUMENTED IN MEDICAL RECORD: ICD-10-PCS | Mod: CPTII,,, | Performed by: SPECIALIST

## 2023-12-12 PROCEDURE — 1159F MED LIST DOCD IN RCRD: CPT | Mod: CPTII,,, | Performed by: SPECIALIST

## 2023-12-12 PROCEDURE — 1101F PT FALLS ASSESS-DOCD LE1/YR: CPT | Mod: CPTII,,, | Performed by: SPECIALIST

## 2023-12-12 PROCEDURE — 3074F PR MOST RECENT SYSTOLIC BLOOD PRESSURE < 130 MM HG: ICD-10-PCS | Mod: CPTII,,, | Performed by: SPECIALIST

## 2023-12-12 PROCEDURE — 3079F PR MOST RECENT DIASTOLIC BLOOD PRESSURE 80-89 MM HG: ICD-10-PCS | Mod: CPTII,,, | Performed by: SPECIALIST

## 2023-12-21 ENCOUNTER — APPOINTMENT (OUTPATIENT)
Dept: LAB | Facility: HOSPITAL | Age: 63
End: 2023-12-21
Attending: UROLOGY
Payer: MEDICARE

## 2023-12-21 DIAGNOSIS — N30.20 BLADDER INFECTION, CHRONIC: Primary | ICD-10-CM

## 2023-12-21 LAB
APPEARANCE UR: CLEAR
BACTERIA #/AREA URNS AUTO: NORMAL /HPF
BILIRUB UR QL STRIP.AUTO: NEGATIVE
COLOR UR AUTO: YELLOW
GLUCOSE UR QL STRIP.AUTO: NEGATIVE
KETONES UR QL STRIP.AUTO: NEGATIVE
LEUKOCYTE ESTERASE UR QL STRIP.AUTO: ABNORMAL
NITRITE UR QL STRIP.AUTO: NEGATIVE
PH UR STRIP.AUTO: 6.5 [PH]
PROT UR QL STRIP.AUTO: NEGATIVE
RBC #/AREA URNS AUTO: NORMAL /HPF
RBC UR QL AUTO: ABNORMAL
SP GR UR STRIP.AUTO: >=1.03 (ref 1–1.03)
SQUAMOUS #/AREA URNS AUTO: NORMAL /HPF
UROBILINOGEN UR STRIP-ACNC: 0.2
WBC #/AREA URNS AUTO: NORMAL /HPF

## 2023-12-21 PROCEDURE — 81001 URINALYSIS AUTO W/SCOPE: CPT

## 2023-12-21 PROCEDURE — 87186 SC STD MICRODIL/AGAR DIL: CPT

## 2023-12-21 PROCEDURE — 87086 URINE CULTURE/COLONY COUNT: CPT

## 2023-12-23 LAB — BACTERIA UR CULT: ABNORMAL

## 2024-02-07 NOTE — PROGRESS NOTES
Heme/Onc Progress Note    PATIENT: Letha Campbell  MRN: [id number]  DATE: 2/8/2024  Chief Complaint: Follow-up (Patient is here for her 6 month visit)    Current Treatment: Femara    Oncology History   Malignant neoplasm of female breast   3/23/2021 Cancer Staged    Staging form: Breast, AJCC 8th Edition  - Pathologic stage from 3/23/2021: Stage IA (pT1a, pN0, cM0, G2, ER+, IA+, HER2-)     5/6/2021 Surgery    Left breast lumpectomy sentinel lymph node biopsy  Pathology: Well differentiated tumor grade 1, tumor size 5 mm, all margins negative, DCIS, deep margin reresection negative, 1 sentinel lymph node negative for malignancy, pathological staging T1 a N0 M0  Dr. Chairez      - 7/2021 Radiation Therapy    Completed XRT     6/2/2021 -  Hormone Therapy    Femara         02/08/2024  6m follow up. Repeat bone density in June showed some worsening osteopenia  We discussed importance of calcium and vitamin D. She will have some dental work in the near future and will likely start Prolia. She is compliant with her AI. She does have hot flashes and fatigue. She has some arthritic pain in her knees and plans knee replacement.   Past Medical History:   Diagnosis Date    Arthritis     Asymptomatic varicose veins of unspecified lower extremity     Carcinoma of upper-inner quadrant of left female breast     GERD (gastroesophageal reflux disease)     High cholesterol     HLD (hyperlipidemia)     HTN (hypertension)     Hypothyroidism, unspecified     Obesity, unspecified     Personal history of colonic polyps     S/P radiation therapy     Shingles         Current Outpatient Medications:     azelastine (ASTELIN) 137 mcg (0.1 %) nasal spray, , Disp: , Rfl:     calcium carbonate/vitamin D3 (CALCIUM WITH VITAMIN D ORAL),  0 Refill(s), Disp: , Rfl:     coenzyme Q10 100 mg capsule, Take 100 mg by mouth., Disp: , Rfl:     EPINEPHrine (EPIPEN) 0.3 mg/0.3 mL AtIn, Inject into the muscle., Disp: , Rfl:     hydrocortisone 2.5 %  cream, Apply topically., Disp: , Rfl:     letrozole (FEMARA) 2.5 mg Tab, TAKE ONE TABLET BY MOUTH EVERY DAY, Disp: 90 tablet, Rfl: 1    levothyroxine (SYNTHROID) 88 MCG tablet, Take 1 tablet (88 mcg total) by mouth before breakfast., Disp: 90 tablet, Rfl: 3    linaCLOtide (LINZESS) 145 mcg Cap capsule, Take 1 capsule (145 mcg total) by mouth before breakfast. PRN constipation, Disp: 90 capsule, Rfl: 4    multivitamin-minerals-lutein Tab, Take 1 tablet by mouth Daily., Disp: , Rfl:     rosuvastatin (CRESTOR) 20 MG tablet, Take 20 mg by mouth., Disp: , Rfl:     acetaminophen-codeine 300-30mg (TYLENOL #3) 300-30 mg Tab, Take by mouth., Disp: , Rfl:      Review of Systems:   Review of Systems   Constitutional:  Negative for appetite change and unexpected weight change.   HENT:  Negative for mouth sores.    Eyes:  Negative for visual disturbance.   Respiratory:  Negative for cough and shortness of breath.    Cardiovascular:  Negative for chest pain.   Gastrointestinal:  Negative for abdominal pain and diarrhea.   Genitourinary:  Negative for frequency.   Musculoskeletal:  Negative for back pain.   Skin:  Negative for rash.   Neurological:  Negative for headaches.   Hematological:  Negative for adenopathy.   Psychiatric/Behavioral:  The patient is not nervous/anxious.           Objective:     Vitals:    02/08/24 0933   BP: 130/83   Pulse: 65   Temp: 97.7 °F (36.5 °C)           Physical Exam  Constitutional:       Appearance: Normal appearance.   HENT:      Head: Normocephalic and atraumatic.      Nose: Nose normal.      Mouth/Throat:      Mouth: Mucous membranes are moist.   Eyes:      Extraocular Movements: Extraocular movements intact.      Conjunctiva/sclera: Conjunctivae normal.      Pupils: Pupils are equal, round, and reactive to light.   Cardiovascular:      Rate and Rhythm: Normal rate and regular rhythm.      Pulses: Normal pulses.   Pulmonary:      Effort: Pulmonary effort is normal.      Breath sounds: Normal  breath sounds.   Chest:   Breasts:     Left: No bleeding.      Comments: Recent CBE with Petra at Dr. Lester office  Abdominal:      General: Bowel sounds are normal.      Palpations: Abdomen is soft.   Musculoskeletal:      Cervical back: Normal range of motion and neck supple.   Neurological:      General: No focal deficit present.      Mental Status: She is alert and oriented to person, place, and time. Mental status is at baseline.   Psychiatric:         Mood and Affect: Mood normal.         Behavior: Behavior normal.         ECOG SCORE            Assessment and Plan   Stage IA breast cancer  Osteopenia  Long term use of aromatase inhibitor       PLAN:  Continue Femara X 5 years (until 6/2026)  Consider prolia after she completes dental work  Repeat B/L Mg 4/2024, alternating with MRI per Dr. Chairez  Repeat Bone Density 6/2025  Calcium + Vitamin D BID      Follow up in about 6 months (around 8/8/2024) for OV, labs with PCP.

## 2024-02-08 ENCOUNTER — OFFICE VISIT (OUTPATIENT)
Dept: HEMATOLOGY/ONCOLOGY | Facility: CLINIC | Age: 64
End: 2024-02-08
Payer: MEDICARE

## 2024-02-08 VITALS
BODY MASS INDEX: 36.55 KG/M2 | DIASTOLIC BLOOD PRESSURE: 83 MMHG | OXYGEN SATURATION: 97 % | HEIGHT: 64 IN | SYSTOLIC BLOOD PRESSURE: 130 MMHG | TEMPERATURE: 98 F | HEART RATE: 65 BPM | WEIGHT: 214.06 LBS

## 2024-02-08 DIAGNOSIS — C50.912 MALIGNANT NEOPLASM OF LEFT BREAST IN FEMALE, ESTROGEN RECEPTOR POSITIVE, UNSPECIFIED SITE OF BREAST: Primary | ICD-10-CM

## 2024-02-08 DIAGNOSIS — M85.80 OSTEOPENIA, UNSPECIFIED LOCATION: ICD-10-CM

## 2024-02-08 DIAGNOSIS — Z79.811 LONG TERM (CURRENT) USE OF AROMATASE INHIBITORS: ICD-10-CM

## 2024-02-08 DIAGNOSIS — Z17.0 MALIGNANT NEOPLASM OF LEFT BREAST IN FEMALE, ESTROGEN RECEPTOR POSITIVE, UNSPECIFIED SITE OF BREAST: Primary | ICD-10-CM

## 2024-02-08 PROCEDURE — 99214 OFFICE O/P EST MOD 30 MIN: CPT | Mod: S$PBB,,, | Performed by: NURSE PRACTITIONER

## 2024-02-08 PROCEDURE — 99999 PR PBB SHADOW E&M-EST. PATIENT-LVL IV: CPT | Mod: PBBFAC,,, | Performed by: NURSE PRACTITIONER

## 2024-02-08 PROCEDURE — 99214 OFFICE O/P EST MOD 30 MIN: CPT | Mod: PBBFAC | Performed by: NURSE PRACTITIONER

## 2024-02-19 ENCOUNTER — TELEPHONE (OUTPATIENT)
Dept: ORTHOPEDICS | Facility: CLINIC | Age: 64
End: 2024-02-19
Payer: MEDICARE

## 2024-02-19 NOTE — TELEPHONE ENCOUNTER
Patient called for total joint abx for dental work. TKA done in 2020. Called patient to let her know that she did not need antibiotics. Left vm.

## 2024-04-24 DIAGNOSIS — C50.912 MALIGNANT NEOPLASM OF LEFT BREAST IN FEMALE, ESTROGEN RECEPTOR POSITIVE, UNSPECIFIED SITE OF BREAST: ICD-10-CM

## 2024-04-24 DIAGNOSIS — Z17.0 MALIGNANT NEOPLASM OF LEFT BREAST IN FEMALE, ESTROGEN RECEPTOR POSITIVE, UNSPECIFIED SITE OF BREAST: ICD-10-CM

## 2024-04-24 RX ORDER — LETROZOLE 2.5 MG/1
2.5 TABLET, FILM COATED ORAL DAILY
Qty: 90 TABLET | Refills: 1 | Status: SHIPPED | OUTPATIENT
Start: 2024-04-24

## 2024-05-21 LAB — CRC RECOMMENDATION EXT: NORMAL

## 2024-06-04 ENCOUNTER — DOCUMENTATION ONLY (OUTPATIENT)
Dept: INTERNAL MEDICINE | Facility: CLINIC | Age: 64
End: 2024-06-04
Payer: MEDICARE

## 2024-07-25 DIAGNOSIS — E05.90 HYPERTHYROIDISM: ICD-10-CM

## 2024-07-25 DIAGNOSIS — E55.9 VITAMIN D DEFICIENCY: ICD-10-CM

## 2024-07-25 DIAGNOSIS — R73.09 ELEVATED GLUCOSE: ICD-10-CM

## 2024-07-25 DIAGNOSIS — Z00.00 WELLNESS EXAMINATION: Primary | ICD-10-CM

## 2024-07-25 DIAGNOSIS — E53.8 LOW SERUM VITAMIN B12: ICD-10-CM

## 2024-07-25 DIAGNOSIS — E78.2 MIXED HYPERLIPIDEMIA: ICD-10-CM

## 2024-07-26 ENCOUNTER — PATIENT MESSAGE (OUTPATIENT)
Dept: HEMATOLOGY/ONCOLOGY | Facility: CLINIC | Age: 64
End: 2024-07-26
Payer: MEDICARE

## 2024-07-26 ENCOUNTER — TELEPHONE (OUTPATIENT)
Dept: INTERNAL MEDICINE | Facility: CLINIC | Age: 64
End: 2024-07-26
Payer: MEDICARE

## 2024-07-26 NOTE — TELEPHONE ENCOUNTER
----- Message from Juan Daniel Freire MA sent at 7/25/2024  8:30 AM CDT -----  Regarding: PV 8/8/24 @ 10:20 Dr. Casiano  1. Are there any outstanding tasks in the patient's chart? Yes, fasting labs    2. Is there any documentation in the chart? No    3.Has patient been seen in an ER, Urgent care clinic, or been admitted since last visit?  If yes, When, where, and why    4. Has patient seen any other healthcare providers since last visit?  If yes, when, where, and why    5. Has patient had any bloodwork or XR done since last visit?    6. Is patient signed up for patient portal?

## 2024-08-01 ENCOUNTER — LAB VISIT (OUTPATIENT)
Dept: LAB | Facility: HOSPITAL | Age: 64
End: 2024-08-01
Attending: INTERNAL MEDICINE
Payer: MEDICARE

## 2024-08-01 ENCOUNTER — TELEPHONE (OUTPATIENT)
Dept: INTERNAL MEDICINE | Facility: CLINIC | Age: 64
End: 2024-08-01
Payer: MEDICARE

## 2024-08-01 DIAGNOSIS — E05.90 HYPERTHYROIDISM: ICD-10-CM

## 2024-08-01 DIAGNOSIS — E55.9 VITAMIN D DEFICIENCY: ICD-10-CM

## 2024-08-01 DIAGNOSIS — R73.09 ELEVATED GLUCOSE: ICD-10-CM

## 2024-08-01 DIAGNOSIS — I45.10 RBBB: Primary | ICD-10-CM

## 2024-08-01 DIAGNOSIS — Z00.00 WELLNESS EXAMINATION: ICD-10-CM

## 2024-08-01 DIAGNOSIS — E53.8 LOW SERUM VITAMIN B12: ICD-10-CM

## 2024-08-01 DIAGNOSIS — E78.2 MIXED HYPERLIPIDEMIA: ICD-10-CM

## 2024-08-01 LAB
25(OH)D3+25(OH)D2 SERPL-MCNC: 42 NG/ML (ref 30–80)
ALBUMIN SERPL-MCNC: 4 G/DL (ref 3.4–4.8)
ALBUMIN/GLOB SERPL: 1.7 RATIO (ref 1.1–2)
ALP SERPL-CCNC: 65 UNIT/L (ref 40–150)
ALT SERPL-CCNC: 44 UNIT/L (ref 0–55)
ANION GAP SERPL CALC-SCNC: 6 MEQ/L
AST SERPL-CCNC: 25 UNIT/L (ref 5–34)
BACTERIA #/AREA URNS AUTO: ABNORMAL /HPF
BASOPHILS # BLD AUTO: 0.01 X10(3)/MCL
BASOPHILS NFR BLD AUTO: 0.3 %
BILIRUB SERPL-MCNC: 0.9 MG/DL
BILIRUB UR QL STRIP.AUTO: NEGATIVE
BUN SERPL-MCNC: 17.3 MG/DL (ref 9.8–20.1)
CALCIUM SERPL-MCNC: 9.5 MG/DL (ref 8.4–10.2)
CHLORIDE SERPL-SCNC: 108 MMOL/L (ref 98–107)
CHOLEST SERPL-MCNC: 152 MG/DL
CHOLEST/HDLC SERPL: 3 {RATIO} (ref 0–5)
CLARITY UR: CLEAR
CO2 SERPL-SCNC: 27 MMOL/L (ref 23–31)
COLOR UR AUTO: ABNORMAL
CREAT SERPL-MCNC: 0.82 MG/DL (ref 0.55–1.02)
CREAT UR-MCNC: 61.2 MG/DL (ref 45–106)
CREAT/UREA NIT SERPL: 21
EOSINOPHIL # BLD AUTO: 0.07 X10(3)/MCL (ref 0–0.9)
EOSINOPHIL NFR BLD AUTO: 1.9 %
ERYTHROCYTE [DISTWIDTH] IN BLOOD BY AUTOMATED COUNT: 13.2 % (ref 11.5–17)
EST. AVERAGE GLUCOSE BLD GHB EST-MCNC: 116.9 MG/DL
GFR SERPLBLD CREATININE-BSD FMLA CKD-EPI: >60 ML/MIN/1.73/M2
GLOBULIN SER-MCNC: 2.3 GM/DL (ref 2.4–3.5)
GLUCOSE SERPL-MCNC: 106 MG/DL (ref 82–115)
GLUCOSE UR QL STRIP: NORMAL
HBA1C MFR BLD: 5.7 %
HCT VFR BLD AUTO: 41.2 % (ref 37–47)
HDLC SERPL-MCNC: 47 MG/DL (ref 35–60)
HGB BLD-MCNC: 13.6 G/DL (ref 12–16)
HGB UR QL STRIP: ABNORMAL
IMM GRANULOCYTES # BLD AUTO: 0.01 X10(3)/MCL (ref 0–0.04)
IMM GRANULOCYTES NFR BLD AUTO: 0.3 %
KETONES UR QL STRIP: NEGATIVE
LDLC SERPL CALC-MCNC: 93 MG/DL (ref 50–140)
LEUKOCYTE ESTERASE UR QL STRIP: 75
LYMPHOCYTES # BLD AUTO: 1.49 X10(3)/MCL (ref 0.6–4.6)
LYMPHOCYTES NFR BLD AUTO: 39.9 %
MCH RBC QN AUTO: 32.1 PG (ref 27–31)
MCHC RBC AUTO-ENTMCNC: 33 G/DL (ref 33–36)
MCV RBC AUTO: 97.2 FL (ref 80–94)
MICROALBUMIN UR-MCNC: 7.5 UG/ML
MICROALBUMIN/CREAT RATIO PNL UR: 12.3 MG/GM CR (ref 0–30)
MONOCYTES # BLD AUTO: 0.27 X10(3)/MCL (ref 0.1–1.3)
MONOCYTES NFR BLD AUTO: 7.2 %
MUCOUS THREADS URNS QL MICRO: ABNORMAL /LPF
NEUTROPHILS # BLD AUTO: 1.88 X10(3)/MCL (ref 2.1–9.2)
NEUTROPHILS NFR BLD AUTO: 50.4 %
NITRITE UR QL STRIP: NEGATIVE
NRBC BLD AUTO-RTO: 0 %
OHS QRS DURATION: 110 MS
OHS QTC CALCULATION: 426 MS
PH UR STRIP: 6.5 [PH]
PLATELET # BLD AUTO: 132 X10(3)/MCL (ref 130–400)
PLATELETS.RETICULATED NFR BLD AUTO: 2.4 % (ref 0.9–11.2)
PMV BLD AUTO: 10.2 FL (ref 7.4–10.4)
POTASSIUM SERPL-SCNC: 4.5 MMOL/L (ref 3.5–5.1)
PROT SERPL-MCNC: 6.3 GM/DL (ref 5.8–7.6)
PROT UR QL STRIP: NEGATIVE
RBC # BLD AUTO: 4.24 X10(6)/MCL (ref 4.2–5.4)
RBC #/AREA URNS AUTO: ABNORMAL /HPF
SODIUM SERPL-SCNC: 141 MMOL/L (ref 136–145)
SP GR UR STRIP.AUTO: 1.01 (ref 1–1.03)
SQUAMOUS #/AREA URNS LPF: ABNORMAL /HPF
TRIGL SERPL-MCNC: 62 MG/DL (ref 37–140)
TSH SERPL-ACNC: 6.27 UIU/ML (ref 0.35–4.94)
UROBILINOGEN UR STRIP-ACNC: NORMAL
VIT B12 SERPL-MCNC: 512 PG/ML (ref 213–816)
VLDLC SERPL CALC-MCNC: 12 MG/DL
WBC # BLD AUTO: 3.73 X10(3)/MCL (ref 4.5–11.5)
WBC #/AREA URNS AUTO: ABNORMAL /HPF

## 2024-08-01 PROCEDURE — 80053 COMPREHEN METABOLIC PANEL: CPT

## 2024-08-01 PROCEDURE — 82607 VITAMIN B-12: CPT

## 2024-08-01 PROCEDURE — 81015 MICROSCOPIC EXAM OF URINE: CPT

## 2024-08-01 PROCEDURE — 93005 ELECTROCARDIOGRAM TRACING: CPT

## 2024-08-01 PROCEDURE — 36415 COLL VENOUS BLD VENIPUNCTURE: CPT

## 2024-08-01 PROCEDURE — 82043 UR ALBUMIN QUANTITATIVE: CPT

## 2024-08-01 PROCEDURE — 93010 ELECTROCARDIOGRAM REPORT: CPT | Mod: ,,, | Performed by: INTERNAL MEDICINE

## 2024-08-01 PROCEDURE — 82306 VITAMIN D 25 HYDROXY: CPT

## 2024-08-01 PROCEDURE — 83036 HEMOGLOBIN GLYCOSYLATED A1C: CPT

## 2024-08-01 PROCEDURE — 80061 LIPID PANEL: CPT

## 2024-08-01 PROCEDURE — 84443 ASSAY THYROID STIM HORMONE: CPT

## 2024-08-01 PROCEDURE — 81001 URINALYSIS AUTO W/SCOPE: CPT

## 2024-08-01 PROCEDURE — 82570 ASSAY OF URINE CREATININE: CPT

## 2024-08-01 PROCEDURE — 85025 COMPLETE CBC W/AUTO DIFF WBC: CPT

## 2024-08-01 NOTE — TELEPHONE ENCOUNTER
----- Message from Jasper Hagan MD sent at 8/1/2024  4:39 PM CDT -----  EKG reviewed  Some changes noted from previous I do not see a recent echo us go ahead and get an echo  Dx: RBBB

## 2024-08-08 ENCOUNTER — OFFICE VISIT (OUTPATIENT)
Dept: INTERNAL MEDICINE | Facility: CLINIC | Age: 64
End: 2024-08-08
Payer: MEDICARE

## 2024-08-08 VITALS
HEIGHT: 64 IN | OXYGEN SATURATION: 98 % | WEIGHT: 207 LBS | SYSTOLIC BLOOD PRESSURE: 126 MMHG | TEMPERATURE: 97 F | RESPIRATION RATE: 16 BRPM | HEART RATE: 73 BPM | DIASTOLIC BLOOD PRESSURE: 82 MMHG | BODY MASS INDEX: 35.34 KG/M2

## 2024-08-08 DIAGNOSIS — R73.9 HYPERGLYCEMIA: ICD-10-CM

## 2024-08-08 DIAGNOSIS — E66.01 MORBID (SEVERE) OBESITY DUE TO EXCESS CALORIES: ICD-10-CM

## 2024-08-08 DIAGNOSIS — C50.919 MALIGNANT NEOPLASM OF FEMALE BREAST, UNSPECIFIED ESTROGEN RECEPTOR STATUS, UNSPECIFIED LATERALITY, UNSPECIFIED SITE OF BREAST: ICD-10-CM

## 2024-08-08 DIAGNOSIS — M85.80 OSTEOPENIA, UNSPECIFIED LOCATION: ICD-10-CM

## 2024-08-08 DIAGNOSIS — Z00.00 WELCOME TO MEDICARE PREVENTIVE VISIT: Primary | ICD-10-CM

## 2024-08-08 DIAGNOSIS — R94.31 ABNORMAL EKG: ICD-10-CM

## 2024-08-08 DIAGNOSIS — E03.9 HYPOTHYROIDISM, UNSPECIFIED TYPE: ICD-10-CM

## 2024-08-08 RX ORDER — CLOTRIMAZOLE AND BETAMETHASONE DIPROPIONATE 10; .64 MG/G; MG/G
CREAM TOPICAL 2 TIMES DAILY
Qty: 15 G | Refills: 1 | Status: SHIPPED | OUTPATIENT
Start: 2024-08-08

## 2024-08-08 RX ORDER — DICLOFENAC SODIUM 10 MG/G
2 GEL TOPICAL
COMMUNITY

## 2024-08-09 ENCOUNTER — HOSPITAL ENCOUNTER (OUTPATIENT)
Dept: CARDIOLOGY | Facility: HOSPITAL | Age: 64
Discharge: HOME | End: 2024-08-09
Attending: INTERNAL MEDICINE
Payer: MEDICARE

## 2024-08-09 ENCOUNTER — CLINICAL SUPPORT (OUTPATIENT)
Dept: INTERNAL MEDICINE | Facility: CLINIC | Age: 64
End: 2024-08-09
Payer: MEDICARE

## 2024-08-09 DIAGNOSIS — I45.10 RBBB: ICD-10-CM

## 2024-08-09 DIAGNOSIS — Z23 NEED FOR VACCINATION: Primary | ICD-10-CM

## 2024-08-09 LAB
APICAL FOUR CHAMBER EJECTION FRACTION: 64 %
APICAL TWO CHAMBER EJECTION FRACTION: 64 %
AV INDEX (PROSTH): 0.76
AV MEAN GRADIENT: 4 MMHG
AV PEAK GRADIENT: 7 MMHG
AV VALVE AREA BY VELOCITY RATIO: 2.31 CM²
AV VALVE AREA: 2.38 CM²
AV VELOCITY RATIO: 0.74
CV ECHO LV RWT: 0.44 CM
DOP CALC AO PEAK VEL: 1.36 M/S
DOP CALC AO VTI: 30.4 CM
DOP CALC LVOT AREA: 3.1 CM2
DOP CALC LVOT DIAMETER: 2 CM
DOP CALC LVOT PEAK VEL: 1 M/S
DOP CALC LVOT STROKE VOLUME: 72.22 CM3
DOP CALC MV VTI: 44.5 CM
DOP CALCLVOT PEAK VEL VTI: 23 CM
E WAVE DECELERATION TIME: 240 MSEC
E/A RATIO: 0.74
E/E' RATIO: 8.42 M/S
ECHO LV POSTERIOR WALL: 1 CM (ref 0.6–1.1)
FRACTIONAL SHORTENING: 29 % (ref 28–44)
INTERVENTRICULAR SEPTUM: 1.2 CM (ref 0.6–1.1)
LEFT ATRIUM AREA SYSTOLIC (APICAL 2 CHAMBER): 15.6 CM2
LEFT ATRIUM AREA SYSTOLIC (APICAL 4 CHAMBER): 18.8 CM2
LEFT ATRIUM SIZE: 3.2 CM
LEFT ATRIUM VOLUME MOD: 48.4 CM3
LEFT INTERNAL DIMENSION IN SYSTOLE: 3.2 CM (ref 2.1–4)
LEFT VENTRICLE DIASTOLIC VOLUME: 92.45 ML
LEFT VENTRICLE END DIASTOLIC VOLUME APICAL 2 CHAMBER: 79.8 ML
LEFT VENTRICLE END DIASTOLIC VOLUME APICAL 4 CHAMBER: 64.8 ML
LEFT VENTRICLE END SYSTOLIC VOLUME APICAL 2 CHAMBER: 41.2 ML
LEFT VENTRICLE END SYSTOLIC VOLUME APICAL 4 CHAMBER: 50.2 ML
LEFT VENTRICLE SYSTOLIC VOLUME: 40.96 ML
LEFT VENTRICULAR INTERNAL DIMENSION IN DIASTOLE: 4.5 CM (ref 3.5–6)
LEFT VENTRICULAR MASS: 175.02 G
LV LATERAL E/E' RATIO: 7.27 M/S
LV SEPTAL E/E' RATIO: 10 M/S
LVED V (TEICH): 92.45 ML
LVES V (TEICH): 40.96 ML
LVOT MG: 2 MMHG
LVOT MV: 0.68 CM/S
MV MEAN GRADIENT: 3 MMHG
MV PEAK A VEL: 1.08 M/S
MV PEAK E VEL: 0.8 M/S
MV PEAK GRADIENT: 7 MMHG
MV STENOSIS PRESSURE HALF TIME: 80 MS
MV VALVE AREA BY CONTINUITY EQUATION: 1.62 CM2
MV VALVE AREA P 1/2 METHOD: 2.75 CM2
OHS CV RV/LV RATIO: 0.29 CM
OHS LV EJECTION FRACTION SIMPSONS BIPLANE MOD: 64 %
PV PEAK GRADIENT: 4 MMHG
PV PEAK VELOCITY: 1.03 M/S
RIGHT VENTRICULAR END-DIASTOLIC DIMENSION: 1.3 CM
TDI LATERAL: 0.11 M/S
TDI SEPTAL: 0.08 M/S
TDI: 0.1 M/S
TRICUSPID ANNULAR PLANE SYSTOLIC EXCURSION: 1.97 CM

## 2024-08-09 PROCEDURE — 93306 TTE W/DOPPLER COMPLETE: CPT

## 2024-08-09 PROCEDURE — 93306 TTE W/DOPPLER COMPLETE: CPT | Mod: 26,,, | Performed by: INTERNAL MEDICINE

## 2024-08-12 ENCOUNTER — TELEPHONE (OUTPATIENT)
Dept: INTERNAL MEDICINE | Facility: CLINIC | Age: 64
End: 2024-08-12
Payer: MEDICARE

## 2024-08-12 NOTE — TELEPHONE ENCOUNTER
----- Message from Jasper Hagan MD sent at 8/12/2024  8:32 AM CDT -----  Echo reviewed with no evidence of wall motion abnormality  In light of dyspnea and/ or fatigue recommend follow up with Cardiology for stress testing

## 2024-08-12 NOTE — TELEPHONE ENCOUNTER
Mary Ramos Staff  Caller: Unspecified (Today, 10:38 AM)  .Type:  Patient Returning Call    Who Called: pt    Who Left Message for Patient: Mirlande    Does the patient know what this is regarding?: Echo results    Would the patient rather a call back or a response via zervedner?      Best Call Back Number: [phone]    Additional Information:  pt returning a missed call please advise thanks

## 2024-08-12 NOTE — PROGRESS NOTES
Echo reviewed with no evidence of wall motion abnormality  In light of dyspnea and/ or fatigue recommend follow up with Cardiology for stress testing

## 2024-08-12 NOTE — TELEPHONE ENCOUNTER
Jasper Hagan MD [place], [place], [place]  Caller: Unspecified (Today, 10:28 AM)  Yes  Discussed with patient that likely endogenous form of thyroid function is lost because she has been on exogenous thyroid hormone for so long and she should continue to take it

## 2024-08-13 NOTE — PROGRESS NOTES
Heme/Onc Progress Note    PATIENT: Letha Campbell  MRN: [id number]  DATE: 8/14/2024  Chief Complaint: Follow-up (Patient is here for her 6 month visit)    Current Treatment: Femara    Oncology History   Malignant neoplasm of female breast   3/23/2021 Cancer Staged    Staging form: Breast, AJCC 8th Edition  - Pathologic stage from 3/23/2021: Stage IA (pT1a, pN0, cM0, G2, ER+, DC+, HER2-)     5/6/2021 Surgery    Left breast lumpectomy sentinel lymph node biopsy  Pathology: Well differentiated tumor grade 1, tumor size 5 mm, all margins negative, DCIS, deep margin reresection negative, 1 sentinel lymph node negative for malignancy, pathological staging T1 a N0 M0  Dr. Chairez      - 7/2021 Radiation Therapy    Completed XRT     6/2/2021 -  Hormone Therapy    Femara         08/14/2024  6m follow up. Repeat bone density in June showed some worsening osteopenia  We discussed importance of calcium and vitamin D. She will have some dental work in the near future and will likely start Prolia. She is compliant with her AI. She does have hot flashes and fatigue. She has some arthritic pain in her knees and plans knee replacement.   Past Medical History:   Diagnosis Date    Arthritis     Asymptomatic varicose veins of unspecified lower extremity     Carcinoma of upper-inner quadrant of left female breast     GERD (gastroesophageal reflux disease)     High cholesterol     HLD (hyperlipidemia)     HTN (hypertension)     Hypothyroidism, unspecified     Obesity, unspecified     Personal history of colonic polyps     S/P radiation therapy     Shingles         Current Outpatient Medications:     azelastine (ASTELIN) 137 mcg (0.1 %) nasal spray, , Disp: , Rfl:     calcium carbonate/vitamin D3 (CALCIUM WITH VITAMIN D ORAL),  0 Refill(s), Disp: , Rfl:     coenzyme Q10 100 mg capsule, Take 100 mg by mouth., Disp: , Rfl:     diclofenac sodium (VOLTAREN ARTHRITIS PAIN) 1 % Gel, Apply 2 g topically as needed., Disp: , Rfl:      EPINEPHrine (EPIPEN) 0.3 mg/0.3 mL AtIn, Inject into the muscle., Disp: , Rfl:     hydrocortisone 2.5 % cream, Apply topically., Disp: , Rfl:     letrozole (FEMARA) 2.5 mg Tab, Take 1 tablet (2.5 mg total) by mouth once daily., Disp: 90 tablet, Rfl: 1    levothyroxine (SYNTHROID) 88 MCG tablet, Take 1 tablet (88 mcg total) by mouth before breakfast., Disp: 90 tablet, Rfl: 3    linaCLOtide (LINZESS) 145 mcg Cap capsule, Take 1 capsule (145 mcg total) by mouth before breakfast. PRN constipation, Disp: 90 capsule, Rfl: 4    rosuvastatin (CRESTOR) 20 MG tablet, Take 20 mg by mouth., Disp: , Rfl:     clotrimazole-betamethasone 1-0.05% (LOTRISONE) cream, Apply topically 2 (two) times daily., Disp: 15 g, Rfl: 1     Review of Systems:   Review of Systems   Constitutional:  Negative for appetite change and unexpected weight change.   HENT:  Negative for mouth sores.    Eyes:  Negative for visual disturbance.   Respiratory:  Negative for cough and shortness of breath.    Cardiovascular:  Negative for chest pain.   Gastrointestinal:  Negative for abdominal pain and diarrhea.   Genitourinary:  Negative for frequency.   Musculoskeletal:  Negative for back pain.   Skin:  Negative for rash.   Neurological:  Negative for headaches.   Hematological:  Negative for adenopathy.   Psychiatric/Behavioral:  The patient is not nervous/anxious.           Objective:     Vitals:    08/14/24 1105   BP: 127/84   Pulse: 65   Temp: 97.5 °F (36.4 °C)             Physical Exam  Constitutional:       Appearance: Normal appearance.   HENT:      Head: Normocephalic and atraumatic.      Nose: Nose normal.      Mouth/Throat:      Mouth: Mucous membranes are moist.   Eyes:      Extraocular Movements: Extraocular movements intact.      Conjunctiva/sclera: Conjunctivae normal.      Pupils: Pupils are equal, round, and reactive to light.   Cardiovascular:      Rate and Rhythm: Normal rate and regular rhythm.      Pulses: Normal pulses.   Pulmonary:       Effort: Pulmonary effort is normal.      Breath sounds: Normal breath sounds.   Chest:   Breasts:     Left: No bleeding.      Comments: Recent CBE with MARKEL He  Abdominal:      General: Bowel sounds are normal.      Palpations: Abdomen is soft.   Musculoskeletal:      Cervical back: Normal range of motion and neck supple.   Neurological:      General: No focal deficit present.      Mental Status: She is alert and oriented to person, place, and time. Mental status is at baseline.   Psychiatric:         Mood and Affect: Mood normal.         Behavior: Behavior normal.         ECOG SCORE            Assessment and Plan   Stage IA breast cancer  Osteopenia  Long term use of aromatase inhibitor       PLAN:  Continue Femara X 5 years (until 6/2026)  Consider prolia after she completes dental work  Repeat B/L Mg 4/2024, alternating with MRI per Dr. Chairez  Repeat Bone Density 6/2025  Calcium + Vitamin D BID      Follow up in about 6 months (around 2/14/2025) for OV with Nivia, labs with PCP.

## 2024-08-14 ENCOUNTER — OFFICE VISIT (OUTPATIENT)
Dept: HEMATOLOGY/ONCOLOGY | Facility: CLINIC | Age: 64
End: 2024-08-14
Payer: MEDICARE

## 2024-08-14 VITALS
SYSTOLIC BLOOD PRESSURE: 127 MMHG | DIASTOLIC BLOOD PRESSURE: 84 MMHG | WEIGHT: 209.69 LBS | HEART RATE: 65 BPM | OXYGEN SATURATION: 96 % | BODY MASS INDEX: 35.8 KG/M2 | TEMPERATURE: 98 F | HEIGHT: 64 IN

## 2024-08-14 DIAGNOSIS — Z79.811 LONG TERM (CURRENT) USE OF AROMATASE INHIBITORS: ICD-10-CM

## 2024-08-14 DIAGNOSIS — M85.80 OSTEOPENIA, UNSPECIFIED LOCATION: ICD-10-CM

## 2024-08-14 DIAGNOSIS — Z17.0 MALIGNANT NEOPLASM OF LEFT BREAST IN FEMALE, ESTROGEN RECEPTOR POSITIVE, UNSPECIFIED SITE OF BREAST: Primary | ICD-10-CM

## 2024-08-14 DIAGNOSIS — C50.912 MALIGNANT NEOPLASM OF LEFT BREAST IN FEMALE, ESTROGEN RECEPTOR POSITIVE, UNSPECIFIED SITE OF BREAST: Primary | ICD-10-CM

## 2024-08-14 PROCEDURE — 99999 PR PBB SHADOW E&M-EST. PATIENT-LVL IV: CPT | Mod: PBBFAC,,, | Performed by: NURSE PRACTITIONER

## 2024-08-14 PROCEDURE — 99214 OFFICE O/P EST MOD 30 MIN: CPT | Mod: PBBFAC | Performed by: NURSE PRACTITIONER

## 2024-08-21 ENCOUNTER — PATIENT MESSAGE (OUTPATIENT)
Dept: ADMINISTRATIVE | Facility: HOSPITAL | Age: 64
End: 2024-08-21
Payer: MEDICARE

## 2024-08-21 DIAGNOSIS — E05.90 HYPERTHYROIDISM: ICD-10-CM

## 2024-08-21 RX ORDER — LEVOTHYROXINE SODIUM 88 UG/1
88 TABLET ORAL
Qty: 90 TABLET | Refills: 3 | Status: SHIPPED | OUTPATIENT
Start: 2024-08-21

## 2024-08-27 ENCOUNTER — E-VISIT (OUTPATIENT)
Dept: INTERNAL MEDICINE | Facility: CLINIC | Age: 64
End: 2024-08-27
Payer: MEDICARE

## 2024-08-27 ENCOUNTER — TELEPHONE (OUTPATIENT)
Dept: INTERNAL MEDICINE | Facility: CLINIC | Age: 64
End: 2024-08-27
Payer: MEDICARE

## 2024-08-27 DIAGNOSIS — U07.1 COVID-19: Primary | ICD-10-CM

## 2024-08-27 RX ORDER — CHLOPHEDIANOL HCL AND PYRILAMINE MALEATE 12.5; 12.5 MG/5ML; MG/5ML
5 SOLUTION ORAL EVERY 8 HOURS PRN
Qty: 250 ML | Refills: 0 | Status: SHIPPED | OUTPATIENT
Start: 2024-08-27

## 2024-08-27 NOTE — TELEPHONE ENCOUNTER
Spoke to pt who stated that she did test positive for COVID and so did mother. Mother was tested at Urgent care on Saturday and given Paxlovid. Sent pt E-visit to fill out. Pt confirmed understanding about E-visit.

## 2024-08-27 NOTE — PROGRESS NOTES
Patient ID: Letha Campbell is a 64 y.o. female.    Chief Complaint: General Illness (Entered automatically based on patient selection in [email or web address].)    The patient initiated a request through [email or web address] on 2024 for evaluation and management with a chief complaint of General Illness (Entered automatically based on patient selection in [email or web address].)     I evaluated the questionnaire submission on 24.    [facility] E[facility]-Cough And Cold    2024  9:06 AM CDT - Filed by Patient   What do you need help with? Covid 19   Do you agree to participate in an E-Visit? Yes   If you have any of the following symptoms, go to your local emergency room or call 911: I acknowledge   What is the main issue you would like addressed today? Bad cough, runny nose, sore throat, possible covid   Do you think you might have COVID or the Flu? Yes COVID   Have you tested positive for COVID or Flu? Yes COVID   What symptoms do you have? Chills;  Cough;  Headache;  Nasal congestion;  Muscle or body aches;  Nausea;  Runny nose;  Sore throat   When did your symptoms first appear? 2024   List what you have done or taken to help your symptoms. Tylenol, azelastine nose spray   Provide any additional information you feel is important. I did an at home , lol, covid test and have attached a picture   Please attach any relevant images or files    Are you able to take your vital signs? Yes   Systolic Blood Pressure: 126   Diastolic Blood Pressure: 81   Weight: 205.5   Height: 64   Pulse: 93   Temperature: 97.5   Respiration rate:    Pulse Oxygen: 100         Encounter Diagnosis   Name Primary?    COVID-19 Yes        No orders of the defined types were placed in this encounter.     Medications Ordered This Encounter   Medications    nirmatrelvir-ritonavir 300 mg (150 mg x 2)-100 mg copackaged tablets (EUA)     Sig: Take 3 tablets by mouth 2 (two) times daily for 5 days. Each dose contains 2 nirmatrelvir (pink tablets) and 1  ritonavir (white tablet). Take all 3 tablets together     Dispense:  30 tablet     Refill:  0      Do not take Crestor while on this medication  Supportive care; adequate intake of fluids including water and electrolytes  IBU and or Tylenol as needed for fever, nasal congestion,etc  Feel better      E-Visit Time Trackin minutes

## 2024-08-27 NOTE — TELEPHONE ENCOUNTER
----- Message from Rhys Woodard sent at 8/27/2024  8:14 AM CDT -----  .Type:  Needs Medical Advice    Who Called: Letha   Symptoms (please be specific):    How long has patient had these symptoms:    Pharmacy name and phone #:  [facility] [address] in [place]  Would the patient rather a call back or a response via [facility]?   Best Call Back Number: [phone]  Additional Information: Patient requested to speak with Ms. Juan Daniel, please call her back thanks. Re: she thinks she has covid and needs some advice.

## 2024-09-13 ENCOUNTER — CLINICAL SUPPORT (OUTPATIENT)
Dept: INTERNAL MEDICINE | Facility: CLINIC | Age: 64
End: 2024-09-13
Payer: MEDICARE

## 2024-09-13 DIAGNOSIS — Z23 NEED FOR VACCINATION: Primary | ICD-10-CM

## 2024-09-13 PROCEDURE — 90656 IIV3 VACC NO PRSV 0.5 ML IM: CPT | Mod: ,,, | Performed by: INTERNAL MEDICINE

## 2024-09-13 PROCEDURE — G0008 ADMIN INFLUENZA VIRUS VAC: HCPCS | Mod: ,,, | Performed by: INTERNAL MEDICINE

## 2024-09-16 NOTE — PROGRESS NOTES
Pt came into office today for Influenza vaccine.   Pt tolerated immunization well with little/no discomfort.  Influenza was given in left Deltoid.

## 2024-09-26 ENCOUNTER — TELEPHONE (OUTPATIENT)
Dept: INTERNAL MEDICINE | Facility: CLINIC | Age: 64
End: 2024-09-26
Payer: MEDICARE

## 2024-09-26 DIAGNOSIS — C50.912 MALIGNANT NEOPLASM OF LEFT BREAST IN FEMALE, ESTROGEN RECEPTOR POSITIVE, UNSPECIFIED SITE OF BREAST: Primary | ICD-10-CM

## 2024-09-26 DIAGNOSIS — Z17.0 MALIGNANT NEOPLASM OF LEFT BREAST IN FEMALE, ESTROGEN RECEPTOR POSITIVE, UNSPECIFIED SITE OF BREAST: Primary | ICD-10-CM

## 2024-09-26 NOTE — TELEPHONE ENCOUNTER
----- Message from Jasper Hagan MD sent at 9/26/2024  4:06 PM CDT -----  Regarding: RE: incorrect orders.  Order like suggested  ----- Message -----  From: Mirlande Palomo CMA  Sent: 9/26/2024   3:52 PM CDT  To: Jasper Hagan MD  Subject: FW: incorrect orders.                            Do you want the test done as ordered or can I order it like they suggest?  ----- Message -----  From: Carey Johnson  Sent: 9/26/2024  12:38 PM CDT  To: Duane Hutchinson Staff  Subject: incorrect orders.                                Good afternoon,     The attached pt has orders for breast mri that is worded incorrectly for scheduling. Could someone please correct the orders to read as MRI BREAST W/WO CONTRAST, W/CAD, BILATERAL. [facility] doesn't allow us to do just a w/o  contrast mris. If someone could please correct the orders so that we can move forward with her scheduling. Thank you

## 2024-10-17 DIAGNOSIS — C50.912 MALIGNANT NEOPLASM OF LEFT BREAST IN FEMALE, ESTROGEN RECEPTOR POSITIVE, UNSPECIFIED SITE OF BREAST: ICD-10-CM

## 2024-10-17 DIAGNOSIS — Z17.0 MALIGNANT NEOPLASM OF LEFT BREAST IN FEMALE, ESTROGEN RECEPTOR POSITIVE, UNSPECIFIED SITE OF BREAST: ICD-10-CM

## 2024-10-17 RX ORDER — LETROZOLE 2.5 MG/1
TABLET, FILM COATED ORAL
Qty: 90 TABLET | Refills: 1 | Status: SHIPPED | OUTPATIENT
Start: 2024-10-17

## 2024-11-06 ENCOUNTER — APPOINTMENT (OUTPATIENT)
Dept: RADIOLOGY | Facility: HOSPITAL | Age: 64
End: 2024-11-06
Attending: INTERNAL MEDICINE
Payer: MEDICARE

## 2024-11-06 DIAGNOSIS — C50.912 MALIGNANT NEOPLASM OF LEFT BREAST IN FEMALE, ESTROGEN RECEPTOR POSITIVE, UNSPECIFIED SITE OF BREAST: ICD-10-CM

## 2024-11-06 DIAGNOSIS — Z17.0 MALIGNANT NEOPLASM OF LEFT BREAST IN FEMALE, ESTROGEN RECEPTOR POSITIVE, UNSPECIFIED SITE OF BREAST: ICD-10-CM

## 2024-11-06 PROCEDURE — C8937 CAD BREAST MRI: HCPCS | Mod: TC

## 2024-11-06 PROCEDURE — 77049 MRI BREAST C-+ W/CAD BI: CPT | Mod: 26,,, | Performed by: STUDENT IN AN ORGANIZED HEALTH CARE EDUCATION/TRAINING PROGRAM

## 2024-11-06 PROCEDURE — 25500020 PHARM REV CODE 255: Performed by: INTERNAL MEDICINE

## 2024-11-06 PROCEDURE — A9577 INJ MULTIHANCE: HCPCS | Performed by: INTERNAL MEDICINE

## 2024-11-06 RX ADMIN — GADOBENATE DIMEGLUMINE 18 ML: 529 INJECTION, SOLUTION INTRAVENOUS at 01:11

## 2025-01-22 ENCOUNTER — PATIENT MESSAGE (OUTPATIENT)
Dept: INTERNAL MEDICINE | Facility: CLINIC | Age: 65
End: 2025-01-22
Payer: MEDICARE

## 2025-01-23 ENCOUNTER — ON-DEMAND VIRTUAL (OUTPATIENT)
Dept: URGENT CARE | Facility: CLINIC | Age: 65
End: 2025-01-23
Payer: MEDICARE

## 2025-01-23 DIAGNOSIS — R05.9 COUGH, UNSPECIFIED TYPE: ICD-10-CM

## 2025-01-23 DIAGNOSIS — J06.9 UPPER RESPIRATORY TRACT INFECTION, UNSPECIFIED TYPE: Primary | ICD-10-CM

## 2025-01-23 PROCEDURE — 98006 SYNCH AUDIO-VIDEO EST MOD 30: CPT | Mod: 95,,, | Performed by: NURSE PRACTITIONER

## 2025-01-23 RX ORDER — BENZONATATE 100 MG/1
100 CAPSULE ORAL 3 TIMES DAILY PRN
Qty: 60 CAPSULE | Refills: 0 | Status: SHIPPED | OUTPATIENT
Start: 2025-01-23 | End: 2025-02-12

## 2025-01-23 RX ORDER — PROMETHAZINE HYDROCHLORIDE AND DEXTROMETHORPHAN HYDROBROMIDE 6.25; 15 MG/5ML; MG/5ML
5 SYRUP ORAL NIGHTLY PRN
Qty: 35 ML | Refills: 0 | Status: SHIPPED | OUTPATIENT
Start: 2025-01-23 | End: 2025-01-30

## 2025-01-23 NOTE — PROGRESS NOTES
Subjective:      Patient ID: Letha Campbell is a 64 y.o. female.    Vitals:  vitals were not taken for this visit.     Chief Complaint: Cough and Fever      Visit Type: TELE AUDIOVISUAL    Patient Location: Home     Present with the patient at the time of consultation: TELEMED PRESENT WITH PATIENT: None    Past Medical History:   Diagnosis Date    Arthritis     Asymptomatic varicose veins of unspecified lower extremity     Carcinoma of upper-inner quadrant of left female breast     GERD (gastroesophageal reflux disease)     High cholesterol     HLD (hyperlipidemia)     HTN (hypertension)     Hypothyroidism, unspecified     Obesity, unspecified     Personal history of colonic polyps     S/P radiation therapy     Shingles      Past Surgical History:   Procedure Laterality Date    BLADDER AUGMENTATION      BREAST BIOPSY  03/23/2021    BREAST LUMPECTOMY      COLONOSCOPY  07/01/2019    COLONOSCOPY  11/22/2021    LIPOMA RESECTION      SHOULDER    LIPOMA RESECTION      shoulder    TONSILLECTOMY  1961    TONSILLECTOMY AND ADENOIDECTOMY      TOTAL KNEE ARTHROPLASTY Left     [facility]    TUBAL LIGATION       Review of patient's allergies indicates:  No Known Allergies  Current Outpatient Medications on File Prior to Visit   Medication Sig Dispense Refill    azelastine (ASTELIN) 137 mcg (0.1 %) nasal spray       calcium carbonate/vitamin D3 (CALCIUM WITH VITAMIN D ORAL)   0 Refill(s)      clotrimazole-betamethasone 1-0.05% (LOTRISONE) cream Apply topically 2 (two) times daily. 15 g 1    coenzyme Q10 100 mg capsule Take 100 mg by mouth.      diclofenac sodium (VOLTAREN ARTHRITIS PAIN) 1 % Gel Apply 2 g topically as needed.      EPINEPHrine (EPIPEN) 0.3 mg/0.3 mL AtIn Inject into the muscle.      hydrocortisone 2.5 % cream Apply topically.      letrozole (FEMARA) 2.5 mg Tab TAKE 1 TABLET(2.5 MG) BY MOUTH DAILY 90 tablet 1    levothyroxine (SYNTHROID) 88 MCG tablet Take 1 tablet (88 mcg total) by mouth before breakfast. 90  tablet 3    linaCLOtide (LINZESS) 145 mcg Cap capsule Take 1 capsule (145 mcg total) by mouth before breakfast. PRN constipation 90 capsule 4    pyrilamine-chlophedianoL (NINJACOF) 12.5-12.5 mg/5 mL Liqd Take 5 mLs by mouth every 8 (eight) hours as needed (cough). 250 mL 0    rosuvastatin (CRESTOR) 20 MG tablet Take 20 mg by mouth.       No current facility-administered medications on file prior to visit.     Family History   Problem Relation Name Age of Onset    Hyperlipidemia Mother Yudy Campbell     Arthritis Mother Yudy Campbell     Diabetes Mother Yudy Campbell     Hypertension Father NATHALY CAMPBELL     Diabetes Father NATHALY CAMPBELL     Arthritis Father NATHALY CAMPBELL     Prostate cancer Father NATHALY CAMPBELL     Cancer Father NATHALY CAMPBELL         Prostate Cancer       Medications Ordered                [id number] at [place] [address] [facility] [address]   [address] [facility], [facility] [address]    Telephone: [phone]   Fax: [phone]   Hours: Not open 24 hours                         E-Prescribed (2 of 2)              benzonatate (TESSALON) 100 MG capsule    Sig: Take 1 capsule (100 mg total) by mouth 3 (three) times daily as needed for Cough. May take 1-2 caps 3 times daily as needed.       Start: 1/23/25     Quantity: 60 capsule Refills: 0                         promethazine-dextromethorphan (PROMETHAZINE-DM) 6.25-15 mg/5 mL Syrp    Sig: Take 5 mLs by mouth nightly as needed (cough).       Start: 1/23/25     Quantity: 35 mL Refills: 0                           [facility] Peq Odvv Intake    1/23/2025  8:02 AM CST - Filed by Patient   What is your current physical address in the event of a medical emergency? [address] , [place], [place]   Are you able to take your vital signs? Yes   Systolic Blood Pressure: 124   Diastolic Blood Pressure: 89   Weight: 201.5   Height: 64   Pulse: 90   Temperature: 99.4   Respiration rate:    Pulse Oxygen: 94   Please attach any relevant images or files     Is your employer contracted with [facility] [employer]? No         Recent travel. URI symptoms. Monday started with fever(102 highest). +cough, congestion and ear fullness. COVID negative yesterday. No relief with OTC meds and Ninjacof. Seeking further treatment options.    Cough  Associated symptoms include a fever, myalgias and a sore throat. Pertinent negatives include no ear pain, headaches, shortness of breath or wheezing.   Fever   Associated symptoms include congestion, coughing and a sore throat. Pertinent negatives include no diarrhea, ear pain, headaches, nausea, vomiting or wheezing.       Constitution: Positive for fever.   HENT:  Positive for congestion and sore throat. Negative for ear pain, trouble swallowing and voice change.    Respiratory:  Positive for cough and sputum production (mild). Negative for shortness of breath and wheezing.    Gastrointestinal:  Negative for nausea, vomiting and diarrhea.   Musculoskeletal:  Positive for muscle ache.   Neurological:  Negative for headaches.        Objective:   The physical exam was conducted virtually.  Physical Exam   Constitutional: She is oriented to person, place, and time. She does not appear ill. No distress.   HENT:   Head: Normocephalic and atraumatic.   Nose: Nose normal.   Eyes: Extraocular movement intact   Pulmonary/Chest: Effort normal.   Abdominal: Normal appearance.   Musculoskeletal: Normal range of motion.         General: Normal range of motion.   Neurological: no focal deficit. She is alert and oriented to person, place, and time.   Psychiatric: Her behavior is normal. Mood normal.   Vitals reviewed.      Assessment:     1. Upper respiratory tract infection, unspecified type    2. Cough, unspecified type        Plan:   Patient encouraged to monitor symptoms closely and instructed to follow-up for new or worsening symptoms. Further, in-person, evaluation may be necessary for continued treatment. Please follow up with your primary care  doctor or specialist as needed. Verbally discussed plan. Patient confirms understanding and is in agreement with treatment and plan.     You must understand that you've received a Virtual Care evaluation only and that you may be released before all your medical problems are known or treated. You, the patient, will arrange for follow up care as instructed.      Upper respiratory tract infection, unspecified type    Cough, unspecified type  -     benzonatate (TESSALON) 100 MG capsule; Take 1 capsule (100 mg total) by mouth 3 (three) times daily as needed for Cough. May take 1-2 caps 3 times daily as needed.  Dispense: 60 capsule; Refill: 0  -     promethazine-dextromethorphan (PROMETHAZINE-DM) 6.25-15 mg/5 mL Syrp; Take 5 mLs by mouth nightly as needed (cough).  Dispense: 35 mL; Refill: 0             Patient Instructions   OVER THE COUNTER RECOMMENDATIONS/SUGGESTIONS (IF NO CONTRAINDICATIONS).     ·         Make sure to stay well hydrated.     ·         Use Nasal Saline to mechanically move any post nasal drip from your eustachian tube or from the back of your throat.     ·         Use warm saltwater gargles to ease your throat pain. Warm saltwater gargles as needed for sore throat-  1/2 tsp salt to 1 cup warm water, gargle as desired. Warm fluids tend to relieve a sore throat.     .         Throat lozenges, Chloraseptic spray or other over the counter treatments are ok to use as well. Use as directed.     ·         Use an antihistamine such as Claritin, Zyrtec or Allegra to dry you out.     ·         Use pseudoephedrine (behind the counter) to decongest. Pseudoephedrine  30 mg up to 240 mg /day. It can raise your blood pressure and give you palpitations.     ·         Use Mucinex (guaifenesin) to break up mucous up to 2400mg/day to loosen any mucous.     ·         The Mucinex DM pill has a cough suppressant that can be sedating. It can be used at night to stop the tickle at the back of your throat.     ·         You  can use Mucinex D (it has guaifenesin and a high dose of pseudoephedrine) in the mornings to help decongest.     ·         Use Afrin (oxymetazoline) in each nare for no longer than 3 days, as it is addictive. It can also dry out your mucous membranes and cause elevated blood pressure. This is especially useful if you are flying.     ·         Use Flonase 1-2 sprays/nostril per day. It is a local acting steroid nasal spray, if you develop a bloody nose, stop using the medication immediately.     ·         Sometimes Nyquil at night is beneficial to help you get some rest, however it is sedating, and it does have an antihistamine, and Tylenol.     ·         Honey is a natural cough suppressant that can be used.     ·         Tylenol up to 4,000 mg a day is safe for short periods and can be used for body aches, pain, and fever. However, in high doses and prolonged use it can cause liver irritation.     ·         Ibuprofen is a non-steroidal anti-inflammatory that can be used for body aches, pain, and fever. However, it can also cause stomach irritation if overused.

## 2025-01-27 ENCOUNTER — TELEPHONE (OUTPATIENT)
Dept: INTERNAL MEDICINE | Facility: CLINIC | Age: 65
End: 2025-01-27
Payer: MEDICARE

## 2025-01-27 ENCOUNTER — HOSPITAL ENCOUNTER (OUTPATIENT)
Dept: RADIOLOGY | Facility: HOSPITAL | Age: 65
Discharge: HOME | End: 2025-01-27
Attending: INTERNAL MEDICINE
Payer: MEDICARE

## 2025-01-27 DIAGNOSIS — J69.0 ASPIRATION PNEUMONIA, UNSPECIFIED ASPIRATION PNEUMONIA TYPE, UNSPECIFIED LATERALITY, UNSPECIFIED PART OF LUNG: ICD-10-CM

## 2025-01-27 DIAGNOSIS — J69.0 ASPIRATION PNEUMONIA, UNSPECIFIED ASPIRATION PNEUMONIA TYPE, UNSPECIFIED LATERALITY, UNSPECIFIED PART OF LUNG: Primary | ICD-10-CM

## 2025-01-27 PROCEDURE — 71046 X-RAY EXAM CHEST 2 VIEWS: CPT | Mod: TC

## 2025-01-27 RX ORDER — CEFUROXIME AXETIL 500 MG/1
500 TABLET ORAL 2 TIMES DAILY
Qty: 14 TABLET | Refills: 0 | Status: SHIPPED | OUTPATIENT
Start: 2025-01-27 | End: 2025-02-03

## 2025-01-27 RX ORDER — ALBUTEROL SULFATE 90 UG/1
2 INHALANT RESPIRATORY (INHALATION) EVERY 4 HOURS PRN
COMMUNITY
Start: 2025-01-24

## 2025-01-27 NOTE — TELEPHONE ENCOUNTER
"Spoke to pt who stated that she is feeling a little better, and she is not coughing as much and not spitting anything up. She would like to knw is she needs to add any other medications. Pt stated that they did not do a Chest Xray at all. "The provider came into the room listened to my lungs, said you have pneumonia, and then walked out." Pt would like to get an XR   "

## 2025-01-27 NOTE — PROGRESS NOTES
Patient with an infiltrate in the right lung base continue azithromycin Rx cefuroxime also sent to pharmacy  Will need repeat chest x-ray in 2 weeks to document clearance

## 2025-01-27 NOTE — TELEPHONE ENCOUNTER
Pt was diagnosed with pneumonia on Friday at urgent care.  Pt stated that she is having ear pain and is concerned. Pt stated that she was given an inhaler, cough syrup, and azithromycin for 5 days. Pt is asking how will she know she's getting better? Please advise

## 2025-01-27 NOTE — TELEPHONE ENCOUNTER
----- Message from Jasper Hagan MD sent at 1/27/2025  5:04 PM CST -----  Patient with an infiltrate in the right lung base continue azithromycin Rx cefuroxime also sent to pharmacy  Will need repeat chest x-ray in 2 weeks to document clearance

## 2025-01-27 NOTE — TELEPHONE ENCOUNTER
So you can't diagnose pneumonia without a chest xray; the only thing you can diagnose is bronchitis. Ok for chest x ray

## 2025-01-27 NOTE — TELEPHONE ENCOUNTER
----- Message from Ashley sent at 1/27/2025  8:47 AM CST -----  Regarding: call back  .Who Called: Letha Audrey Simon    Caller is requesting assistance/information from provider's office.    Symptoms (please be specific):  cough   How long has patient had these symptoms:    List of preferred pharmacies on file (remove unneeded): [unfilled]  If different, enter pharmacy into here including location and phone number:       Preferred Method of Contact: Phone Call  Patient's Preferred Phone Number on File: [phone]   Best Call Back Number, if different:  Additional Information: pt requesting a call back to discuss symptoms

## 2025-01-28 ENCOUNTER — TELEPHONE (OUTPATIENT)
Dept: INTERNAL MEDICINE | Facility: CLINIC | Age: 65
End: 2025-01-28
Payer: MEDICARE

## 2025-01-28 DIAGNOSIS — J69.0 ASPIRATION PNEUMONIA, UNSPECIFIED ASPIRATION PNEUMONIA TYPE, UNSPECIFIED LATERALITY, UNSPECIFIED PART OF LUNG: Primary | ICD-10-CM

## 2025-01-28 NOTE — TELEPHONE ENCOUNTER
----- Message from Adolfo sent at 1/28/2025  9:15 AM CST -----  .Who Called: Letha Audrey Simon    Caller is requesting information on test results.    Name of Test (Lab/Mammo/Etc): x ray  Date of Test: 01/27/25  Where the test was performed:   Ordering Provider:     Preferred Method of Contact: Phone Call  Patient's Preferred Phone Number on File: [phone]   Best Call Back Number, if different:  Additional Information:

## 2025-01-28 NOTE — TELEPHONE ENCOUNTER
Pt informed she is not contagious.     Pt is asking for a nebulizer solution script. Please advise

## 2025-01-30 ENCOUNTER — TELEPHONE (OUTPATIENT)
Dept: INTERNAL MEDICINE | Facility: CLINIC | Age: 65
End: 2025-01-30
Payer: MEDICARE

## 2025-01-30 RX ORDER — IPRATROPIUM BROMIDE AND ALBUTEROL SULFATE 2.5; .5 MG/3ML; MG/3ML
3 SOLUTION RESPIRATORY (INHALATION) EVERY 6 HOURS PRN
Qty: 75 ML | Refills: 0 | Status: SHIPPED | OUTPATIENT
Start: 2025-01-30 | End: 2026-01-30

## 2025-01-30 NOTE — TELEPHONE ENCOUNTER
Note from payer: Denied. IPRATROPIUM-ALBUTEROL Solution is used in a nebulizer. A nebulizer is a piece of durable medical equipment (DME). Drugs used with DME in the home are covered under Medicare Part B. Our records show that you do not live in a long term care (LTC) facility. We cannot pay for drugs under Medicare Part D if they are covered under Medicare Part A or B. We did not decide whether IPRATROPIUM-ALBUTEROL Solution is medically necessary. We made our decision only on the fact that we cannot pay for the drug under Medicare Part D. For more information, talk to your prescriber or call [phone] 130-[phone].

## 2025-01-30 NOTE — TELEPHONE ENCOUNTER
----- Message from Med Assistant Frances sent at 1/30/2025 10:53 AM CST -----  Regarding: PV 2/12/25 @ 11:20 Dr. Casiano  1. Are there any outstanding tasks in the patient's chart? Yes, fasting labs    2. Is there any documentation in the chart? No    3.Has patient been seen in an ER, Urgent care clinic, or been admitted since last visit?  If yes, When, where, and why    4. Has patient seen any other healthcare providers since last visit?  If yes, when, where, and why    5. Has patient had any bloodwork or XR done since last visit?    6. Is patient signed up for patient portal?    7. Does patient have home blood pressure cuff?   If yes, please have patient bring to appointment for validation.

## 2025-01-30 NOTE — TELEPHONE ENCOUNTER
1st ATC pt to let her know that she should be able to give pharmacy her medicare Part B card to have them run the claim through their since covered under part B. LVM to call back

## 2025-02-05 ENCOUNTER — PATIENT MESSAGE (OUTPATIENT)
Dept: INTERNAL MEDICINE | Facility: CLINIC | Age: 65
End: 2025-02-05
Payer: MEDICARE

## 2025-02-06 ENCOUNTER — LAB VISIT (OUTPATIENT)
Dept: LAB | Facility: HOSPITAL | Age: 65
End: 2025-02-06
Attending: INTERNAL MEDICINE
Payer: MEDICARE

## 2025-02-06 ENCOUNTER — TELEPHONE (OUTPATIENT)
Dept: INTERNAL MEDICINE | Facility: CLINIC | Age: 65
End: 2025-02-06
Payer: MEDICARE

## 2025-02-06 DIAGNOSIS — E03.9 HYPOTHYROIDISM, UNSPECIFIED TYPE: ICD-10-CM

## 2025-02-06 DIAGNOSIS — R73.9 HYPERGLYCEMIA: ICD-10-CM

## 2025-02-06 DIAGNOSIS — J69.0 ASPIRATION PNEUMONIA, UNSPECIFIED ASPIRATION PNEUMONIA TYPE, UNSPECIFIED LATERALITY, UNSPECIFIED PART OF LUNG: Primary | ICD-10-CM

## 2025-02-06 LAB
ALBUMIN SERPL-MCNC: 3.8 G/DL (ref 3.4–4.8)
ALBUMIN/GLOB SERPL: 1.4 RATIO (ref 1.1–2)
ALP SERPL-CCNC: 64 UNIT/L (ref 40–150)
ALT SERPL-CCNC: 26 UNIT/L (ref 0–55)
ANION GAP SERPL CALC-SCNC: 6 MEQ/L
AST SERPL-CCNC: 20 UNIT/L (ref 5–34)
BACTERIA #/AREA URNS AUTO: NORMAL /HPF
BASOPHILS # BLD AUTO: 0.01 X10(3)/MCL
BASOPHILS NFR BLD AUTO: 0.3 %
BILIRUB SERPL-MCNC: 0.8 MG/DL
BILIRUB UR QL STRIP.AUTO: NEGATIVE
BUN SERPL-MCNC: 18.1 MG/DL (ref 9.8–20.1)
CALCIUM SERPL-MCNC: 9.1 MG/DL (ref 8.4–10.2)
CHLORIDE SERPL-SCNC: 109 MMOL/L (ref 98–107)
CHOLEST SERPL-MCNC: 152 MG/DL
CHOLEST/HDLC SERPL: 4 {RATIO} (ref 0–5)
CLARITY UR: CLEAR
CO2 SERPL-SCNC: 27 MMOL/L (ref 23–31)
COLOR UR AUTO: ABNORMAL
CREAT SERPL-MCNC: 0.69 MG/DL (ref 0.55–1.02)
CREAT/UREA NIT SERPL: 26
EOSINOPHIL # BLD AUTO: 0.12 X10(3)/MCL (ref 0–0.9)
EOSINOPHIL NFR BLD AUTO: 3.8 %
ERYTHROCYTE [DISTWIDTH] IN BLOOD BY AUTOMATED COUNT: 13 % (ref 11.5–17)
EST. AVERAGE GLUCOSE BLD GHB EST-MCNC: 116.9 MG/DL
GFR SERPLBLD CREATININE-BSD FMLA CKD-EPI: >60 ML/MIN/1.73/M2
GLOBULIN SER-MCNC: 2.8 GM/DL (ref 2.4–3.5)
GLUCOSE SERPL-MCNC: 110 MG/DL (ref 82–115)
GLUCOSE UR QL STRIP: NEGATIVE
HBA1C MFR BLD: 5.7 %
HCT VFR BLD AUTO: 38.4 % (ref 37–47)
HDLC SERPL-MCNC: 39 MG/DL (ref 35–60)
HGB BLD-MCNC: 12.9 G/DL (ref 12–16)
HGB UR QL STRIP: ABNORMAL
IMM GRANULOCYTES # BLD AUTO: 0 X10(3)/MCL (ref 0–0.04)
IMM GRANULOCYTES NFR BLD AUTO: 0 %
KETONES UR QL STRIP: NEGATIVE
LDLC SERPL CALC-MCNC: 95 MG/DL (ref 50–140)
LEUKOCYTE ESTERASE UR QL STRIP: NEGATIVE
LYMPHOCYTES # BLD AUTO: 1.23 X10(3)/MCL (ref 0.6–4.6)
LYMPHOCYTES NFR BLD AUTO: 39 %
MCH RBC QN AUTO: 32.6 PG (ref 27–31)
MCHC RBC AUTO-ENTMCNC: 33.6 G/DL (ref 33–36)
MCV RBC AUTO: 97 FL (ref 80–94)
MONOCYTES # BLD AUTO: 0.24 X10(3)/MCL (ref 0.1–1.3)
MONOCYTES NFR BLD AUTO: 7.6 %
NEUTROPHILS # BLD AUTO: 1.55 X10(3)/MCL (ref 2.1–9.2)
NEUTROPHILS NFR BLD AUTO: 49.3 %
NITRITE UR QL STRIP: NEGATIVE
PH UR STRIP: 6 [PH]
PLATELET # BLD AUTO: 171 X10(3)/MCL (ref 130–400)
PMV BLD AUTO: 9.9 FL (ref 7.4–10.4)
POTASSIUM SERPL-SCNC: 4.2 MMOL/L (ref 3.5–5.1)
PROT SERPL-MCNC: 6.6 GM/DL (ref 5.8–7.6)
PROT UR QL STRIP: NEGATIVE
RBC # BLD AUTO: 3.96 X10(6)/MCL (ref 4.2–5.4)
RBC #/AREA URNS AUTO: NORMAL /HPF
SODIUM SERPL-SCNC: 142 MMOL/L (ref 136–145)
SP GR UR STRIP.AUTO: 1.01 (ref 1–1.03)
SQUAMOUS #/AREA URNS AUTO: NORMAL /HPF
TRIGL SERPL-MCNC: 88 MG/DL (ref 37–140)
TSH SERPL-ACNC: 4.24 UIU/ML (ref 0.35–4.94)
UROBILINOGEN UR STRIP-ACNC: 0.2
VLDLC SERPL CALC-MCNC: 18 MG/DL
WBC # BLD AUTO: 3.15 X10(3)/MCL (ref 4.5–11.5)
WBC #/AREA URNS AUTO: NORMAL /HPF

## 2025-02-06 PROCEDURE — 83036 HEMOGLOBIN GLYCOSYLATED A1C: CPT

## 2025-02-06 PROCEDURE — 81003 URINALYSIS AUTO W/O SCOPE: CPT

## 2025-02-06 PROCEDURE — 80061 LIPID PANEL: CPT

## 2025-02-06 PROCEDURE — 85025 COMPLETE CBC W/AUTO DIFF WBC: CPT

## 2025-02-06 PROCEDURE — 80053 COMPREHEN METABOLIC PANEL: CPT

## 2025-02-06 PROCEDURE — 84443 ASSAY THYROID STIM HORMONE: CPT

## 2025-02-06 PROCEDURE — 36415 COLL VENOUS BLD VENIPUNCTURE: CPT

## 2025-02-06 NOTE — TELEPHONE ENCOUNTER
----- Message from Concha sent at 2/6/2025 12:56 PM CST -----  Type: General Call Back     Name of Caller:pt   Reason pt is asking for her XR orders, come Monday 2/10 would be the two week lydia and she would like to do the XR before Wednesday 2/12  Would the patient rather a call back or a response via [facility]? Call   Best Call Back Number:[phone]   Additional Information:

## 2025-02-10 ENCOUNTER — HOSPITAL ENCOUNTER (OUTPATIENT)
Dept: RADIOLOGY | Facility: HOSPITAL | Age: 65
Discharge: HOME | End: 2025-02-10
Attending: INTERNAL MEDICINE
Payer: MEDICARE

## 2025-02-10 DIAGNOSIS — J69.0 ASPIRATION PNEUMONIA, UNSPECIFIED ASPIRATION PNEUMONIA TYPE, UNSPECIFIED LATERALITY, UNSPECIFIED PART OF LUNG: ICD-10-CM

## 2025-02-10 PROCEDURE — 71046 X-RAY EXAM CHEST 2 VIEWS: CPT | Mod: TC

## 2025-02-11 ENCOUNTER — PATIENT MESSAGE (OUTPATIENT)
Dept: INTERNAL MEDICINE | Facility: CLINIC | Age: 65
End: 2025-02-11
Payer: MEDICARE

## 2025-02-12 ENCOUNTER — OFFICE VISIT (OUTPATIENT)
Dept: INTERNAL MEDICINE | Facility: CLINIC | Age: 65
End: 2025-02-12
Payer: MEDICARE

## 2025-02-12 VITALS
WEIGHT: 200 LBS | OXYGEN SATURATION: 93 % | HEART RATE: 95 BPM | BODY MASS INDEX: 34.15 KG/M2 | TEMPERATURE: 97 F | HEIGHT: 64 IN | RESPIRATION RATE: 16 BRPM | DIASTOLIC BLOOD PRESSURE: 78 MMHG | SYSTOLIC BLOOD PRESSURE: 126 MMHG

## 2025-02-12 DIAGNOSIS — E66.01 MORBID (SEVERE) OBESITY DUE TO EXCESS CALORIES: ICD-10-CM

## 2025-02-12 DIAGNOSIS — E03.9 HYPOTHYROIDISM, UNSPECIFIED TYPE: ICD-10-CM

## 2025-02-12 DIAGNOSIS — Z00.00 MEDICARE ANNUAL WELLNESS VISIT, SUBSEQUENT: Primary | ICD-10-CM

## 2025-02-12 DIAGNOSIS — C50.912 MALIGNANT NEOPLASM OF LEFT BREAST IN FEMALE, ESTROGEN RECEPTOR POSITIVE, UNSPECIFIED SITE OF BREAST: ICD-10-CM

## 2025-02-12 DIAGNOSIS — Z17.0 MALIGNANT NEOPLASM OF LEFT BREAST IN FEMALE, ESTROGEN RECEPTOR POSITIVE, UNSPECIFIED SITE OF BREAST: ICD-10-CM

## 2025-02-12 DIAGNOSIS — M17.11 OSTEOARTHRITIS OF RIGHT KNEE, UNSPECIFIED OSTEOARTHRITIS TYPE: ICD-10-CM

## 2025-02-12 DIAGNOSIS — Z78.0 POSTMENOPAUSAL STATE: ICD-10-CM

## 2025-02-12 DIAGNOSIS — M85.80 OSTEOPENIA, UNSPECIFIED LOCATION: ICD-10-CM

## 2025-02-12 PROBLEM — E53.8 LOW SERUM VITAMIN B12: Status: RESOLVED | Noted: 2022-07-15 | Resolved: 2025-02-12

## 2025-02-12 PROBLEM — F43.9 STRESS: Status: RESOLVED | Noted: 2023-08-03 | Resolved: 2025-02-12

## 2025-02-12 PROBLEM — R30.0 DYSURIA: Status: RESOLVED | Noted: 2023-01-26 | Resolved: 2025-02-12

## 2025-02-12 PROBLEM — M17.12 ARTHRITIS OF LEFT KNEE: Status: RESOLVED | Noted: 2023-01-26 | Resolved: 2025-02-12

## 2025-02-12 PROBLEM — K59.00 CONSTIPATION: Status: RESOLVED | Noted: 2022-05-18 | Resolved: 2025-02-12

## 2025-02-12 PROBLEM — Z71.3 WEIGHT LOSS COUNSELING, ENCOUNTER FOR: Status: RESOLVED | Noted: 2022-05-18 | Resolved: 2025-02-12

## 2025-02-12 PROBLEM — R94.31 ABNORMAL EKG: Status: RESOLVED | Noted: 2024-08-08 | Resolved: 2025-02-12

## 2025-02-12 PROBLEM — R73.9 HYPERGLYCEMIA: Status: RESOLVED | Noted: 2024-08-08 | Resolved: 2025-02-12

## 2025-02-12 PROBLEM — I83.90 VARICOSE VEINS OF LOWER EXTREMITY: Status: RESOLVED | Noted: 2023-08-03 | Resolved: 2025-02-12

## 2025-02-12 NOTE — PROGRESS NOTES
Internal Medicine      Patient ID: [id number]     Chief Complaint: Medicare Annual Wellness     HPI:     Letha Campbell is a 64 y.o. female here today for a Medicare Annual Wellness visit and comprehensive Health Risk Assessment.     Patient is recovering from a recent episode of pneumonia. She reports a cough, particularly with deep breathing, attributed to the re-expansion of previously congested lung tissue, especially when the consolidation is in the basilar segment of the lung.    She is currently on hormone blockade therapy with Femara for cancer treatment, prescribed to continue for 5 years. Her last oncologist appointment with [facility] was on August 14th. She reports increased sweating lately, which appears to be an ongoing symptom.    Her thyroid function has improved since last year but is still not optimal. She admits to inconsistent medication adherence, particularly forgetting to take her thyroid medication on weekends and having not taken it for about a week prior to this visit.    She expresses ongoing concern about her white and RBC counts. She is reassured that while her white blood cell count is low, this is expected following pneumonia, and the differential count of white blood cells is normal.    She discusses an upcoming appointment with an orthopedic surgeon ; She mentions issues with her left knee, which may require surgery, potentially impacting her ability to drive for 5-6 weeks post-operation.    She denies fever, chills, and new onset of night sweats. She denies any bone marrow problems.    TEST RESULTS:  Patient's recent thyroid test showed results at the upper end of normal, which is an improvement from last year. Her recent blood sugar and cholesterol levels were within normal limits. The chloride level was slightly elevated, possibly indicating a free water deficit. Her neutrophil percentage is improving, showing recovery from illness. The white blood cell count was low (leukopenic),  while the platelet count and differential white blood cell count were normal.    IMAGING:  Patient underwent a bone density scan in 2022 and is due for a repeat in June. She also had a mammogram in April.        [facility]; last scope was in 11/2021 with multiple polys  [facility] Oncology; Stage IA infiltrating ductal carcinoma of the left breast ; on Cassidy Spears for breast surgery she is due for breast MRI in November she does not want to do it at the place she had at the last 2 times because it is uncomfortable  Osteopenia; no need for bisphosphonate    Health Maintenance         Date Due Completion Date    Hepatitis C Screening Never done ---    TETANUS VACCINE Never done ---    Shingles Vaccine (1 of 2) Never done ---    RSV Vaccine (Age 60+ and Pregnant patients) (1 - Risk 60-74 years 1-dose series) Never done ---    COVID-19 Vaccine (3 - 2024-25 season) 09/01/2024 11/15/2021    Mammogram 04/16/2025 4/16/2024    DEXA Scan 06/23/2025 6/23/2023    Hemoglobin A1c (Prediabetes) 02/06/2026 2/6/2025    Colorectal Cancer Screening 05/21/2029 5/21/2024    Lipid Panel 02/06/2030 2/6/2025             Past Medical History:   Diagnosis Date    Arthritis     Asymptomatic varicose veins of unspecified lower extremity     Carcinoma of upper-inner quadrant of left female breast     GERD (gastroesophageal reflux disease)     High cholesterol     HLD (hyperlipidemia)     HTN (hypertension)     Hypothyroidism, unspecified     Obesity, unspecified     Personal history of colonic polyps     S/P radiation therapy     Shingles         Past Surgical History:   Procedure Laterality Date    BLADDER AUGMENTATION      BREAST BIOPSY  03/23/2021    BREAST LUMPECTOMY      COLONOSCOPY  07/01/2019    COLONOSCOPY  11/22/2021    LIPOMA RESECTION      SHOULDER    LIPOMA RESECTION      shoulder    TONSILLECTOMY  1961    TONSILLECTOMY AND ADENOIDECTOMY      TOTAL KNEE ARTHROPLASTY Left     JUDE KELBY    TUBAL LIGATION          Social History      Socioeconomic History    Marital status:    Tobacco Use    Smoking status: Never    Smokeless tobacco: Never   Substance and Sexual Activity    Alcohol use: Yes     Comment: Occassionally    Drug use: Never    Sexual activity: Not Currently     Social Drivers of Health     Financial Resource Strain: Low Risk  (2/11/2025)    Overall Financial Resource Strain (CARDIA)     Difficulty of Paying Living Expenses: Not very hard   Food Insecurity: No Food Insecurity (2/11/2025)    Hunger Vital Sign     Worried About Running Out of Food in the Last Year: Never true     Ran Out of Food in the Last Year: Never true   Transportation Needs: No Transportation Needs (2/11/2025)    PRAPARE - Transportation     Lack of Transportation (Medical): No     Lack of Transportation (Non-Medical): No   Physical Activity: Insufficiently Active (2/11/2025)    Exercise Vital Sign     Days of Exercise per Week: 2 days     Minutes of Exercise per Session: 30 min   Stress: No Stress Concern Present (2/11/2025)    [employer] [place] [place] [employer] - Occupational Stress Questionnaire     Feeling of Stress : Not at all   Housing Stability: Low Risk  (2/11/2025)    Housing Stability Vital Sign     Unable to Pay for Housing in the Last Year: No     Number of Times Moved in the Last Year: 0     Homeless in the Last Year: No        Family History   Problem Relation Name Age of Onset    Hyperlipidemia Mother Yudy Campbell     Arthritis Mother Yudy Campbell     Diabetes Mother Yudy Campbell     Hypertension Father NATHALY CAMPBELL     Diabetes Father NATHALY CAMPBELL     Arthritis Father NATHALY CAMPBELL     Prostate cancer Father NATHALY CAMPBELL     Cancer Father NATHALY CAMPBELL         Prostate Cancer        Current Outpatient Medications   Medication Instructions    albuterol (PROVENTIL/VENTOLIN HFA) 90 mcg/actuation inhaler 2 puffs, Every 4 hours PRN    albuterol-ipratropium (DUO-NEB) 2.5 mg-0.5 mg/3 mL nebulizer solution 3 mLs, Nebulization, Every 6 hours PRN,  Rescue    azelastine (ASTELIN) 137 mcg (0.1 %) nasal spray No dose, route, or frequency recorded.    benzonatate (TESSALON) 100 mg, Oral, 3 times daily PRN, May take 1-2 caps 3 times daily as needed.    calcium carbonate/vitamin D3 (CALCIUM WITH VITAMIN D ORAL)   0 Refill(s)    clotrimazole-betamethasone 1-0.05% (LOTRISONE) cream Topical (Top), 2 times daily    coenzyme Q10 100 mg    diclofenac sodium (VOLTAREN ARTHRITIS PAIN) 2 g, As needed (PRN)    EPINEPHrine (EPIPEN) 0.3 mg/0.3 mL AtIn Inject into the muscle.    hydrocortisone 2.5 % cream Apply topically.    letrozole (FEMARA) 2.5 mg Tab TAKE 1 TABLET(2.5 MG) BY MOUTH DAILY    levothyroxine (SYNTHROID) 88 mcg, Oral, Before breakfast    linaCLOtide (LINZESS) 145 mcg, Oral, Before breakfast, PRN constipation    pyrilamine-chlophedianoL (NINJACOF) 12.5-12.5 mg/5 mL Liqd 5 mLs, Oral, Every 8 hours PRN    rosuvastatin (CRESTOR) 20 mg       Review of patient's allergies indicates:  No Known Allergies     Immunization History   Administered Date(s) Administered    COVID-19, MRNA, LN-S, PF (Pfizer) (Purple Cap) 11/15/2021    COVID-19, vector-nr, rS-Ad26, PF (Norma) 04/09/2021    Influenza (FLUAD) - Quadrivalent - Adjuvanted - PF *Preferred* (65+) 10/10/2023    Influenza - Quadrivalent - PF *Preferred* (6 months and older) 11/18/2020, 10/17/2022    Influenza - Trivalent - Afluria, Fluzone MDV 11/18/2020    Influenza - Trivalent - Fluarix, Flulaval, Fluzone, Afluria - PF 12/11/2017, 11/29/2021, 09/13/2024    Pneumococcal Conjugate - 20 Valent 08/09/2024        Patient Care Team:  Jasper Hagan MD as PCP - General (Internal Medicine)  Turner Bear MD as Consulting Physician (Gastroenterology)  [facility] Calvin AL IV, MD as Consulting Physician (Surgical Oncology)  JOSE MANUEL Person Jr., MD as Consulting Physician (Gynecology)  Naila Zavala MD as Consulting Physician (Cardiovascular Disease)    Subjective:  "    Review of Systems    12 point review of systems conducted, negative except as stated in the history of present illness. See HPI for details.    Objective:     Visit Vitals  /78 (BP Location: Right arm, Patient Position: Sitting)   Pulse 95   Temp 97.3 °F (36.3 °C) (Temporal)   Resp 16   Ht 5' 4" (1.626 m)   Wt 90.7 kg (200 lb)   SpO2 (!) 93%   BMI 34.33 kg/m²       Physical Exam  Constitutional:       Appearance: Normal appearance. She is obese.   HENT:      Head: Normocephalic and atraumatic.   Eyes:      Extraocular Movements: Extraocular movements intact.      Pupils: Pupils are equal, round, and reactive to light.   Cardiovascular:      Rate and Rhythm: Normal rate and regular rhythm.   Pulmonary:      Effort: Pulmonary effort is normal.      Breath sounds: Normal breath sounds.   Skin:     General: Skin is warm and dry.   Neurological:      General: No focal deficit present.      Mental Status: She is alert.   Psychiatric:         Mood and Affect: Mood normal.           Labs Reviewed:     Chemistry:  Lab Results   Component Value Date     02/06/2025    K 4.2 02/06/2025    BUN 18.1 02/06/2025    CREATININE 0.69 02/06/2025    EGFRNORACEVR >60 02/06/2025    GLUCOSE 110 02/06/2025    CALCIUM 9.1 02/06/2025    ALKPHOS 64 02/06/2025    LABPROT 6.6 02/06/2025    ALBUMIN 3.8 02/06/2025    BILIDIR 0.3 05/14/2022    IBILI 0.40 05/14/2022    AST 20 02/06/2025    ALT 26 02/06/2025    LEDJAGII09WG 42 08/01/2024    TSH 4.241 02/06/2025    GYAXNF4CMUF 0.92 05/14/2022        Lab Results   Component Value Date    HGBA1C 5.7 02/06/2025        Hematology:  Lab Results   Component Value Date    WBC 3.15 (L) 02/06/2025    HGB 12.9 02/06/2025    HCT 38.4 02/06/2025     02/06/2025       Lipid Panel:  Lab Results   Component Value Date    CHOL 152 02/06/2025    HDL 39 02/06/2025    LDL 95.00 02/06/2025    TRIG 88 02/06/2025    TOTALCHOLEST 4 02/06/2025        Urine:  Lab Results   Component Value Date    " APPEARANCEUA Clear 02/06/2025    SGUA 1.015 02/06/2025    PROTEINUA Negative 02/06/2025    KETONESUA Negative 02/06/2025    LEUKOCYTESUR Negative 02/06/2025    RBCUA 3-5 02/06/2025    WBCUA None Seen 02/06/2025    BACTERIA None Seen 02/06/2025    SQEPUA Trace 08/01/2024    CREATRANDUR 61.2 08/01/2024        Assessment and Plan:     Assessment & Plan    C50.912, Z17.0 Malignant neoplasm of unspecified site of left female breast  E66.01 Morbid (severe) obesity due to excess calories  Z78.0 Postmenopausal state  M85.80 Osteopenia, unspecified location  M17.11 Osteoarthritis of right knee, unspecified osteoarthritis type  E03.9 Hypothyroidism, unspecified type  Z00.00 Medicare annual wellness visit, subsequent    IMPRESSION:   Assessed recent pneumonia recovery, noting improved lung sounds and resolution of consolidation in basilar segment   Reviewed labs, noting improvement in thyroid function but still at upper end of normal range, likely due to inconsistent medication adherence   Evaluated white blood cell count, determining low count is expected post-pneumonia and not indicative of bone marrow issues given normal platelet count and differential   Considered bone health management, deferring decision on Prolia initiation due to patient's age and preference for less aggressive initial approach   Planned to reassess bone density in June before determining course of treatment, potentially initiating Evenity before considering Prolia    C50.912, Z17.0 MALIGNANT NEOPLASM OF UNSPECIFIED SITE OF LEFT FEMALE BREAST:  - Continue Femara (hormone blockade) for ongoing 5-year course as per oncologist's recommendation from last visit on August 14th.  - Monitor patient's response to therapy.  - Evaluate labs, which show improving neutrophil percentage and low white blood cell count with normal differential.  - Schedule mammogram for April and bone density scan for June (last one in 2022).  - Consider initiating Prolia after dental  work completion, or possibly start Evenity first.    M85.80 OSTEOPENIA, UNSPECIFIED LOCATION:  - Bone density scan scheduled for June as part of breast cancer and osteopenia management.    E03.9 HYPOTHYROIDISM, UNSPECIFIED TYPE:  - Continue thyroid medication, emphasizing the importance of consistent daily administration.    Z00.00 MEDICARE ANNUAL WELLNESS VISIT, SUBSEQUENT:  - Explain coughing mechanism related to lung tissue re-expansion post-pneumonia.  - Discuss the relationship between chloride levels and hydration status.  - Clarify that low white blood cell count is a normal post-illness finding and not a cause for concern given normal differential.  - Schedule follow-up visit in 6 months.    E66.01 MORBID (SEVERE) OBESITY DUE TO EXCESS CALORIES:  - Monitor the patient's weight loss progress.          A comprehensive HEALTH RISK ASSESSMENT was completed today. Results are summarized below:                  The patient is NOT A TOBACCO USER.  The patient reports NO SIGNIFICANT ALCOHOL USE.     All Questions regarding food, transportation or housing were not answered today.    The patient was asked and declined the use of a free .    Advance Care Planning   I offered to discuss advance care planning but Letha Campbell is unwilling to engage in a discussion regarding Advance Directives at this time.         Provided patient with a 5-10 year written screening schedule and personal prevention plan. Recommendations were developed using the USPSTF age appropriate recommendations. Education, counseling, and referrals were provided as needed. After Visit Summary printed and given to patient, which includes a list of additional screenings\tests needed.    No follow-ups on file. In addition to their scheduled follow up, the patient has also been instructed to follow up on as needed basis.     Future Appointments   Date Time Provider Department Center   2/19/2025 10:30 AM Nivia Macdonald FNP [facility] HEMONC  [facility]   8/12/2025 10:00 AM Jasper Hagan MD [id number]        Jasper Hagan MD

## 2025-02-19 ENCOUNTER — OFFICE VISIT (OUTPATIENT)
Dept: HEMATOLOGY/ONCOLOGY | Facility: CLINIC | Age: 65
End: 2025-02-19
Payer: MEDICARE

## 2025-02-19 VITALS
HEART RATE: 74 BPM | WEIGHT: 205 LBS | OXYGEN SATURATION: 95 % | HEIGHT: 64 IN | SYSTOLIC BLOOD PRESSURE: 114 MMHG | BODY MASS INDEX: 35 KG/M2 | DIASTOLIC BLOOD PRESSURE: 80 MMHG | TEMPERATURE: 98 F

## 2025-02-19 DIAGNOSIS — Z17.0 MALIGNANT NEOPLASM OF LEFT BREAST IN FEMALE, ESTROGEN RECEPTOR POSITIVE, UNSPECIFIED SITE OF BREAST: ICD-10-CM

## 2025-02-19 DIAGNOSIS — Z86.19 FREQUENT INFECTIONS: ICD-10-CM

## 2025-02-19 DIAGNOSIS — D70.4 CYCLICAL NEUTROPENIA: ICD-10-CM

## 2025-02-19 DIAGNOSIS — M85.80 OSTEOPENIA, UNSPECIFIED LOCATION: ICD-10-CM

## 2025-02-19 DIAGNOSIS — E53.8 LOW SERUM VITAMIN B12: ICD-10-CM

## 2025-02-19 DIAGNOSIS — Z17.0 MALIGNANT NEOPLASM OF LEFT BREAST IN FEMALE, ESTROGEN RECEPTOR POSITIVE, UNSPECIFIED SITE OF BREAST: Primary | ICD-10-CM

## 2025-02-19 DIAGNOSIS — C50.912 MALIGNANT NEOPLASM OF LEFT BREAST IN FEMALE, ESTROGEN RECEPTOR POSITIVE, UNSPECIFIED SITE OF BREAST: Primary | ICD-10-CM

## 2025-02-19 DIAGNOSIS — C50.912 MALIGNANT NEOPLASM OF LEFT BREAST IN FEMALE, ESTROGEN RECEPTOR POSITIVE, UNSPECIFIED SITE OF BREAST: ICD-10-CM

## 2025-02-19 DIAGNOSIS — Z79.811 LONG TERM (CURRENT) USE OF AROMATASE INHIBITORS: ICD-10-CM

## 2025-02-19 PROCEDURE — 99214 OFFICE O/P EST MOD 30 MIN: CPT | Mod: PBBFAC | Performed by: NURSE PRACTITIONER

## 2025-02-19 PROCEDURE — 99999 PR PBB SHADOW E&M-EST. PATIENT-LVL IV: CPT | Mod: PBBFAC,,, | Performed by: NURSE PRACTITIONER

## 2025-02-19 RX ORDER — LETROZOLE 2.5 MG/1
2.5 TABLET, FILM COATED ORAL DAILY
Qty: 90 TABLET | Refills: 3 | Status: SHIPPED | OUTPATIENT
Start: 2025-02-19

## 2025-02-19 NOTE — PROGRESS NOTES
Heme/Onc Progress Note    PATIENT: Letha Campbell  MRN: [id number]  DATE: 2/19/2025  Chief Complaint: Follow-up (Recovered from pneumonia end of January 2025/Wants to discuss blood counts)    Current Treatment: Femara    Oncology History   Malignant neoplasm of female breast   3/23/2021 Cancer Staged    Staging form: Breast, AJCC 8th Edition  - Pathologic stage from 3/23/2021: Stage IA (pT1a, pN0, cM0, G2, ER+, NC+, HER2-)     5/6/2021 Surgery    Left breast lumpectomy sentinel lymph node biopsy  Pathology: Well differentiated tumor grade 1, tumor size 5 mm, all margins negative, DCIS, deep margin reresection negative, 1 sentinel lymph node negative for malignancy, pathological staging T1 a N0 M0  Dr. Chairez      - 7/2021 Radiation Therapy    Completed XRT     6/2/2021 -  Hormone Therapy    Femara         02/19/2025  6m follow up. On femara, tolerating well. Concerned about frequent infections and low white count that has been waxing and waning for years. Covid in 8/2024, Pneumonia 1/2025, however, she was around other sick people.     Past Medical History:   Diagnosis Date    Arthritis     Asymptomatic varicose veins of unspecified lower extremity     Carcinoma of upper-inner quadrant of left female breast     GERD (gastroesophageal reflux disease)     High cholesterol     HLD (hyperlipidemia)     HTN (hypertension)     Hypothyroidism, unspecified     Obesity, unspecified     Personal history of colonic polyps     S/P radiation therapy     Shingles         Current Outpatient Medications:     azelastine (ASTELIN) 137 mcg (0.1 %) nasal spray, as needed., Disp: , Rfl:     calcium carbonate/vitamin D3 (CALCIUM WITH VITAMIN D ORAL),  0 Refill(s), Disp: , Rfl:     clotrimazole-betamethasone 1-0.05% (LOTRISONE) cream, Apply topically 2 (two) times daily. (Patient taking differently: Apply topically 2 (two) times daily.), Disp: 15 g, Rfl: 1    coenzyme Q10 100 mg capsule, Take 100 mg by mouth., Disp: , Rfl:      hydrocortisone 2.5 % cream, Apply topically., Disp: , Rfl:     levothyroxine (SYNTHROID) 88 MCG tablet, Take 1 tablet (88 mcg total) by mouth before breakfast., Disp: 90 tablet, Rfl: 3    rosuvastatin (CRESTOR) 20 MG tablet, Take 20 mg by mouth., Disp: , Rfl:     albuterol (PROVENTIL/VENTOLIN HFA) 90 mcg/actuation inhaler, Inhale 2 puffs into the lungs every 4 (four) hours as needed for Wheezing or Shortness of Breath. (Patient not taking: Reported on 2/19/2025), Disp: , Rfl:     albuterol-ipratropium (DUO-NEB) 2.5 mg-0.5 mg/3 mL nebulizer solution, Take 3 mLs by nebulization every 6 (six) hours as needed for Wheezing. Rescue (Patient not taking: Reported on 2/19/2025), Disp: 75 mL, Rfl: 0    diclofenac sodium (VOLTAREN ARTHRITIS PAIN) 1 % Gel, Apply 2 g topically as needed. (Patient not taking: Reported on 2/19/2025), Disp: , Rfl:     EPINEPHrine (EPIPEN) 0.3 mg/0.3 mL AtIn, Inject into the muscle. (Patient not taking: Reported on 2/19/2025), Disp: , Rfl:     letrozole (FEMARA) 2.5 mg Tab, Take 1 tablet (2.5 mg total) by mouth once daily., Disp: 90 tablet, Rfl: 3    linaCLOtide (LINZESS) 145 mcg Cap capsule, Take 1 capsule (145 mcg total) by mouth before breakfast. PRN constipation (Patient not taking: Reported on 2/19/2025), Disp: 90 capsule, Rfl: 4    pyrilamine-chlophedianoL (NINJACOF) 12.5-12.5 mg/5 mL Liqd, Take 5 mLs by mouth every 8 (eight) hours as needed (cough). (Patient not taking: Reported on 2/19/2025), Disp: 250 mL, Rfl: 0     Review of Systems:   Review of Systems   Constitutional:  Negative for appetite change and unexpected weight change.   HENT:  Negative for mouth sores.    Eyes:  Negative for visual disturbance.   Respiratory:  Negative for cough and shortness of breath.    Cardiovascular:  Negative for chest pain.   Gastrointestinal:  Negative for abdominal pain and diarrhea.   Genitourinary:  Negative for frequency.   Musculoskeletal:  Negative for back pain.   Skin:  Negative for rash.    Neurological:  Negative for headaches.   Hematological:  Negative for adenopathy.   Psychiatric/Behavioral:  The patient is not nervous/anxious.           Objective:     Vitals:    02/19/25 1052   BP: 114/80   Pulse: 74   Temp: 97.5 °F (36.4 °C)          Physical Exam  Constitutional:       Appearance: Normal appearance.   HENT:      Head: Normocephalic and atraumatic.      Nose: Nose normal.      Mouth/Throat:      Mouth: Mucous membranes are moist.   Eyes:      Extraocular Movements: Extraocular movements intact.      Conjunctiva/sclera: Conjunctivae normal.      Pupils: Pupils are equal, round, and reactive to light.   Cardiovascular:      Rate and Rhythm: Normal rate and regular rhythm.      Pulses: Normal pulses.   Pulmonary:      Effort: Pulmonary effort is normal.      Breath sounds: Normal breath sounds.   Chest:   Breasts:     Left: No bleeding.      Comments: Recent CBE with Dr. Person GYN  Abdominal:      General: Bowel sounds are normal.      Palpations: Abdomen is soft.   Musculoskeletal:      Cervical back: Normal range of motion and neck supple.   Neurological:      General: No focal deficit present.      Mental Status: She is alert and oriented to person, place, and time. Mental status is at baseline.   Psychiatric:         Mood and Affect: Mood normal.         Behavior: Behavior normal.         ECOG SCORE            Assessment and Plan   Stage IA breast cancer  Osteopenia  Long term use of aromatase inhibitor   B12 deficiency  Mild chronic neutropenia, macrocytosis due to b12 deficiency  Frequent infections-check QI      PLAN:  Continue Femara X 5 years (until 6/2026)  Consider prolia after next bone density  Repeat B/L Mg 5/2025, alternating with MRI per Dr. Chairez  Repeat Bone Density 6/2025-order by Dr. Casiano  Calcium + Vitamin D BID    She will do labs with Dr. Casiano in August. Will add b12 and QI to that lab      Follow up in about 6 months (around 8/19/2025) for OV/Labs with   [facility] at [facility].

## 2025-04-07 ENCOUNTER — TELEPHONE (OUTPATIENT)
Dept: INTERNAL MEDICINE | Facility: CLINIC | Age: 65
End: 2025-04-07
Payer: MEDICARE

## 2025-04-07 NOTE — TELEPHONE ENCOUNTER
Copied from CRM #[id number]. Topic: Appointments - Appointment Scheduling  >> Apr 7, 2025  2:12 PM Lindsey wrote:  .Type:  Patient Returning Call    Who Called:pt  Who Left Message for Patient:pt  Does the patient know what this is regarding?:Does not want NP appt  Would the patient rather a call back or a response via [facility]?   Best Call Back Number:[phone]  Additional Information: Please have Juan Daniel call back. Patient want a sooner appt with  did not want NP

## 2025-04-15 ENCOUNTER — OFFICE VISIT (OUTPATIENT)
Dept: INTERNAL MEDICINE | Facility: CLINIC | Age: 65
End: 2025-04-15
Payer: MEDICARE

## 2025-04-15 VITALS
HEART RATE: 65 BPM | RESPIRATION RATE: 16 BRPM | TEMPERATURE: 97 F | HEIGHT: 64 IN | WEIGHT: 203 LBS | SYSTOLIC BLOOD PRESSURE: 120 MMHG | DIASTOLIC BLOOD PRESSURE: 82 MMHG | BODY MASS INDEX: 34.66 KG/M2 | OXYGEN SATURATION: 96 %

## 2025-04-15 DIAGNOSIS — Z01.818 PREOP EXAMINATION: Primary | ICD-10-CM

## 2025-04-15 PROCEDURE — 99214 OFFICE O/P EST MOD 30 MIN: CPT | Mod: ,,, | Performed by: INTERNAL MEDICINE

## 2025-04-15 NOTE — PROGRESS NOTES
Internal Medicine    Patient ID: [id number]     Chief Complaint: Pre-op Exam      HPI:     Letha Campbell is a 65 y.o. female here today for preoperative testing for right total knee replacement with Dr. Maged Campos on May 8th.  She is able to perform a met level of more than 4 with no chest pain or shortness a breath.  I do not have an EKG in front of me right now but she has received formal cardiac clearance.  Patient with a revised cardiac risk index score of 0    Bear; last scope was in 11/2021 with multiple polys  [facility] On[facility]; Stage IA infiltrating ductal carcinoma of the left breast ; on Cassidy Spears for breast surgery   Osteopenia; no need for bisphosphonate    Past Medical History:   Diagnosis Date    Arthritis     Asymptomatic varicose veins of unspecified lower extremity     Carcinoma of upper-inner quadrant of left female breast     GERD (gastroesophageal reflux disease)     High cholesterol     HLD (hyperlipidemia)     HTN (hypertension)     Hypothyroidism, unspecified     Obesity, unspecified     Personal history of colonic polyps     S/P radiation therapy     Shingles         Past Surgical History:   Procedure Laterality Date    BLADDER AUGMENTATION      BREAST BIOPSY  03/23/2021    BREAST LUMPECTOMY      COLONOSCOPY  07/01/2019    COLONOSCOPY  11/22/2021    JOINT REPLACEMENT  2/2020    LIPOMA RESECTION      SHOULDER    LIPOMA RESECTION      shoulder    TONSILLECTOMY  1961    TONSILLECTOMY AND ADENOIDECTOMY      TOTAL KNEE ARTHROPLASTY Left     JUDE KELBY    TUBAL LIGATION          Social History     Tobacco Use    Smoking status: Never    Smokeless tobacco: Never   Substance and Sexual Activity    Alcohol use: Yes     Comment: Occassionally    Drug use: Never    Sexual activity: Not Currently        Current Outpatient Medications   Medication Instructions    azelastine (ASTELIN) 137 mcg (0.1 %) nasal spray As needed (PRN)    calcium carbonate/vitamin D3 (CALCIUM WITH VITAMIN D  "ORAL)   0 Refill(s)    coenzyme Q10 100 mg    hydrocortisone 2.5 % cream Apply topically.    letrozole (FEMARA) 2.5 mg, Oral, Daily    levothyroxine (SYNTHROID) 88 mcg, Oral, Before breakfast    rosuvastatin (CRESTOR) 20 mg       Review of patient's allergies indicates:  No Known Allergies     Patient Care Team:  Jasper Hagan MD as PCP - General (Internal Medicine)  Turner Bear MD as Consulting Physician (Gastroenterology)  [facility] [place], Calvin AL IV, MD as Consulting Physician (Surgical Oncology)  JOSE MANUEL Person Jr., MD as Consulting Physician (Gynecology)  Naila Zavala MD as Consulting Physician (Cardiovascular Disease)     Subjective:     Review of Systems    12 point review of systems conducted, negative except as stated in the history of present illness. See HPI for details.    Objective:     Visit Vitals  /82 (BP Location: Right arm, Patient Position: Sitting)   Pulse 65   Temp 97.4 °F (36.3 °C) (Temporal)   Resp 16   Ht 5' 4" (1.626 m)   Wt 92.1 kg (203 lb)   SpO2 96%   BMI 34.84 kg/m²       Physical Exam  Constitutional:       Appearance: Normal appearance. She is obese.   HENT:      Head: Normocephalic and atraumatic.   Eyes:      Extraocular Movements: Extraocular movements intact.      Pupils: Pupils are equal, round, and reactive to light.   Cardiovascular:      Rate and Rhythm: Normal rate and regular rhythm.   Pulmonary:      Effort: Pulmonary effort is normal.      Breath sounds: Normal breath sounds.   Skin:     General: Skin is warm and dry.   Neurological:      General: No focal deficit present.      Mental Status: She is alert.   Psychiatric:         Mood and Affect: Mood normal.         Labs Reviewed:     Chemistry:  Lab Results   Component Value Date     02/06/2025    K 4.2 02/06/2025    BUN 18.1 02/06/2025    CREATININE 0.69 02/06/2025    EGFRNORACEVR >60 02/06/2025    GLUCOSE 110 02/06/2025    CALCIUM 9.1 02/06/2025    " ALKPHOS 64 02/06/2025    LABPROT 6.6 02/06/2025    ALBUMIN 3.8 02/06/2025    BILIDIR 0.3 05/14/2022    IBILI 0.40 05/14/2022    AST 20 02/06/2025    ALT 26 02/06/2025    [id number] 42 08/01/2024    TSH 4.241 02/06/2025    [id number] 0.92 05/14/2022        Lab Results   Component Value Date    HGBA1C 5.7 02/06/2025        Hematology:  Lab Results   Component Value Date    WBC 3.15 (L) 02/06/2025    HGB 12.9 02/06/2025    HCT 38.4 02/06/2025     02/06/2025       Lipid Panel:  Lab Results   Component Value Date    CHOL 152 02/06/2025    HDL 39 02/06/2025    LDL 95.00 02/06/2025    TRIG 88 02/06/2025    TOTALCHOLEST 4 02/06/2025        Urine:  Lab Results   Component Value Date    APPEARANCEUA Clear 02/06/2025    SGUA 1.015 02/06/2025    PROTEINUA Negative 02/06/2025    KETONESUA Negative 02/06/2025    LEUKOCYTESUR Negative 02/06/2025    RBCUA 3-5 02/06/2025    WBCUA None Seen 02/06/2025    BACTERIA None Seen 02/06/2025    SQEPUA Trace 08/01/2024    CREATRANDUR 61.2 08/01/2024        Assessment:       ICD-10-CM ICD-9-CM   1. Preop examination  Z01.818 V72.84        Plan:     1. Preop examination  Assessment & Plan:  Patient able to perform a met level of more than 4 with no chest pain or shortness a breath revised cardiac risk score of 0.  No further preoperative testing needed for low risk patient for low risk procedure.  Does not need to hold any of her current medications.           Future Appointments   Date Time Provider Department Center   6/26/2025 12:30 PM [facility] DEXA1 300 LB LIMIT [facility] XRAY  [facility]    8/12/2025 10:00 AM Jasper Hagan MD [facility] [id number] [id number]   8/20/2025 11:00 AM Nivia Macdonald FNP [facility]        Jasper Hagan MD

## 2025-04-15 NOTE — PROGRESS NOTES
Internal Medicine    Patient ID: [id number]     Chief Complaint: Pre-op Exam      HPI:     Letha Campbell is a 65 y.o. female here today for a follow up.   History of Present Illness             Past Medical History:   Diagnosis Date    Arthritis     Asymptomatic varicose veins of unspecified lower extremity     Carcinoma of upper-inner quadrant of left female breast     GERD (gastroesophageal reflux disease)     High cholesterol     HLD (hyperlipidemia)     HTN (hypertension)     Hypothyroidism, unspecified     Obesity, unspecified     Personal history of colonic polyps     S/P radiation therapy     Shingles         Past Surgical History:   Procedure Laterality Date    BLADDER AUGMENTATION      BREAST BIOPSY  03/23/2021    BREAST LUMPECTOMY      COLONOSCOPY  07/01/2019    COLONOSCOPY  11/22/2021    JOINT REPLACEMENT  2/2020    LIPOMA RESECTION      SHOULDER    LIPOMA RESECTION      shoulder    TONSILLECTOMY  1961    TONSILLECTOMY AND ADENOIDECTOMY      TOTAL KNEE ARTHROPLASTY Left     JUDE KELBY    TUBAL LIGATION          Social History     Tobacco Use    Smoking status: Never    Smokeless tobacco: Never   Substance and Sexual Activity    Alcohol use: Yes     Comment: Occassionally    Drug use: Never    Sexual activity: Not Currently        Current Outpatient Medications   Medication Instructions    albuterol (PROVENTIL/VENTOLIN HFA) 90 mcg/actuation inhaler 2 puffs, Every 4 hours PRN    albuterol-ipratropium (DUO-NEB) 2.5 mg-0.5 mg/3 mL nebulizer solution 3 mLs, Nebulization, Every 6 hours PRN, Rescue    azelastine (ASTELIN) 137 mcg (0.1 %) nasal spray As needed (PRN)    calcium carbonate/vitamin D3 (CALCIUM WITH VITAMIN D ORAL)   0 Refill(s)    clotrimazole-betamethasone 1-0.05% (LOTRISONE) cream Topical (Top), 2 times daily    coenzyme Q10 100 mg    diclofenac sodium (VOLTAREN ARTHRITIS PAIN) 2 g, As needed (PRN)    EPINEPHrine (EPIPEN) 0.3 mg/0.3 mL AtIn Inject into the muscle.    hydrocortisone 2.5 % cream  "Apply topically.    letrozole (FEMARA) 2.5 mg, Oral, Daily    levothyroxine (SYNTHROID) 88 mcg, Oral, Before breakfast    linaCLOtide (LINZESS) 145 mcg, Oral, Before breakfast, PRN constipation    pyrilamine-chlophedianoL (NINJACOF) 12.5-12.5 mg/5 mL Liqd 5 mLs, Oral, Every 8 hours PRN    rosuvastatin (CRESTOR) 20 mg       Review of patient's allergies indicates:  No Known Allergies     Patient Care Team:  Jasper Hagan MD as PCP - General (Internal Medicine)  Turner Bear MD as Consulting Physician (Gastroenterology)  [facility] as Consulting Physician (Surgical Oncology)  JOSE MANUEL Person Jr., MD as Consulting Physician (Gynecology)  Naila Zavala MD as Consulting Physician (Cardiovascular Disease)     Subjective:     Review of Systems    12 point review of systems conducted, negative except as stated in the history of present illness. See HPI for details.    Objective:     Visit Vitals  /82 (BP Location: Right arm, Patient Position: Sitting)   Pulse 65   Temp 97.4 °F (36.3 °C) (Temporal)   Resp 16   Ht 5' 4" (1.626 m)   Wt 92.1 kg (203 lb)   SpO2 96%   BMI 34.84 kg/m²       Physical Exam    Labs Reviewed:     Chemistry:  Lab Results   Component Value Date     02/06/2025    K 4.2 02/06/2025    BUN 18.1 02/06/2025    CREATININE 0.69 02/06/2025    EGFRNORACEVR >60 02/06/2025    GLUCOSE 110 02/06/2025    CALCIUM 9.1 02/06/2025    ALKPHOS 64 02/06/2025    LABPROT 6.6 02/06/2025    ALBUMIN 3.8 02/06/2025    BILIDIR 0.3 05/14/2022    IBILI 0.40 05/14/2022    AST 20 02/06/2025    ALT 26 02/06/2025    [id number] 42 08/01/2024    TSH 4.241 02/06/2025    [id number] 0.92 05/14/2022        Lab Results   Component Value Date    HGBA1C 5.7 02/06/2025        Hematology:  Lab Results   Component Value Date    WBC 3.15 (L) 02/06/2025    HGB 12.9 02/06/2025    HCT 38.4 02/06/2025     02/06/2025       Lipid Panel:  Lab Results "   Component Value Date    CHOL 152 02/06/2025    HDL 39 02/06/2025    LDL 95.00 02/06/2025    TRIG 88 02/06/2025    TOTALCHOLEST 4 02/06/2025        Urine:  Lab Results   Component Value Date    APPEARANCEUA Clear 02/06/2025    SGUA 1.015 02/06/2025    PROTEINUA Negative 02/06/2025    KETONESUA Negative 02/06/2025    LEUKOCYTESUR Negative 02/06/2025    RBCUA 3-5 02/06/2025    WBCUA None Seen 02/06/2025    BACTERIA None Seen 02/06/2025    SQEPUA Trace 08/01/2024    CREATRANDUR 61.2 08/01/2024        Assessment and Plan:     Assessment & Plan               No follow-ups on file. In addition to their scheduled follow up, the patient has also been instructed to follow up on as needed basis.     Future Appointments   Date Time Provider Department Center   6/26/2025 12:30 PM [facility] DEXA1 300 LB LIMIT [facility] XRAY [facility]   8/12/2025 10:00 AM Jasper Hagan MD [id number] [id number]   8/20/2025 11:00 AM Nivia Macdonald, CAITP [facility]EMON[facility]        Jasper Hagan MD

## 2025-04-15 NOTE — ASSESSMENT & PLAN NOTE
Patient able to perform a met level of more than 4 with no chest pain or shortness a breath revised cardiac risk score of 0.  No further preoperative testing needed for low risk patient for low risk procedure.  Does not need to hold any of her current medications.

## 2025-04-16 LAB — BCS RECOMMENDATION EXT: NORMAL

## 2025-06-27 ENCOUNTER — HOSPITAL ENCOUNTER (OUTPATIENT)
Dept: RADIOLOGY | Facility: HOSPITAL | Age: 65
Discharge: HOME | End: 2025-06-27
Attending: INTERNAL MEDICINE
Payer: MEDICARE

## 2025-06-27 DIAGNOSIS — Z78.0 POSTMENOPAUSAL STATE: ICD-10-CM

## 2025-06-27 DIAGNOSIS — C50.912 MALIGNANT NEOPLASM OF LEFT BREAST IN FEMALE, ESTROGEN RECEPTOR POSITIVE, UNSPECIFIED SITE OF BREAST: ICD-10-CM

## 2025-06-27 DIAGNOSIS — Z17.0 MALIGNANT NEOPLASM OF LEFT BREAST IN FEMALE, ESTROGEN RECEPTOR POSITIVE, UNSPECIFIED SITE OF BREAST: ICD-10-CM

## 2025-06-27 PROCEDURE — 77080 DXA BONE DENSITY AXIAL: CPT | Mod: TC

## 2025-06-30 ENCOUNTER — RESULTS FOLLOW-UP (OUTPATIENT)
Dept: HEPATOLOGY | Facility: HOSPITAL | Age: 65
End: 2025-06-30
Payer: MEDICARE

## 2025-07-29 DIAGNOSIS — R73.9 HYPERGLYCEMIA: ICD-10-CM

## 2025-07-29 DIAGNOSIS — E78.2 MIXED HYPERLIPIDEMIA: Primary | ICD-10-CM

## 2025-07-29 DIAGNOSIS — E03.9 HYPOTHYROIDISM, UNSPECIFIED TYPE: ICD-10-CM

## 2025-07-30 ENCOUNTER — TELEPHONE (OUTPATIENT)
Dept: INTERNAL MEDICINE | Facility: CLINIC | Age: 65
End: 2025-07-30
Payer: MEDICARE

## 2025-07-30 NOTE — TELEPHONE ENCOUNTER
----- Message from Med Assistant Frances sent at 7/30/2025 12:18 PM CDT -----  Regarding: SURESH 8/12/25 @ 10:00 Dr. Casiano  1. Are there any outstanding tasks in the patient's chart? Yes, Fasting Labs     2. Does patient have home blood pressure cuff?  [ ] Yes  /   [ ] No  (If yes, please have patient bring to appointment for validation.)    3. Remind patient to bring in a list of medications or bottles of all medications including:   A. All Prescription Medications  B. Over-the-Counter Supplements and/or Vitamins  C. Drops (ear and/or eye)  D. Topical Creams

## 2025-08-08 ENCOUNTER — RESULTS FOLLOW-UP (OUTPATIENT)
Dept: HEPATOLOGY | Facility: HOSPITAL | Age: 65
End: 2025-08-08
Payer: MEDICARE

## 2025-08-08 ENCOUNTER — LAB VISIT (OUTPATIENT)
Dept: LAB | Facility: HOSPITAL | Age: 65
End: 2025-08-08
Attending: INTERNAL MEDICINE
Payer: MEDICARE

## 2025-08-08 DIAGNOSIS — R73.9 HYPERGLYCEMIA: ICD-10-CM

## 2025-08-08 DIAGNOSIS — E03.9 HYPOTHYROIDISM, UNSPECIFIED TYPE: ICD-10-CM

## 2025-08-08 DIAGNOSIS — E78.2 MIXED HYPERLIPIDEMIA: ICD-10-CM

## 2025-08-08 LAB
ALBUMIN SERPL-MCNC: 4 G/DL (ref 3.4–4.8)
ALBUMIN/GLOB SERPL: 1.4 RATIO (ref 1.1–2)
ALP SERPL-CCNC: 76 UNIT/L (ref 40–150)
ALT SERPL-CCNC: 33 UNIT/L (ref 0–55)
ANION GAP SERPL CALC-SCNC: 6 MEQ/L
AST SERPL-CCNC: 25 UNIT/L (ref 11–45)
BACTERIA #/AREA URNS AUTO: NORMAL /HPF
BILIRUB SERPL-MCNC: 0.7 MG/DL
BILIRUB UR QL STRIP.AUTO: NEGATIVE
BUN SERPL-MCNC: 18.7 MG/DL (ref 9.8–20.1)
CALCIUM SERPL-MCNC: 9.3 MG/DL (ref 8.4–10.2)
CHLORIDE SERPL-SCNC: 110 MMOL/L (ref 98–107)
CHOLEST SERPL-MCNC: 149 MG/DL
CHOLEST/HDLC SERPL: 3 {RATIO} (ref 0–5)
CLARITY UR: CLEAR
CO2 SERPL-SCNC: 27 MMOL/L (ref 23–31)
COLOR UR AUTO: ABNORMAL
CREAT SERPL-MCNC: 0.67 MG/DL (ref 0.55–1.02)
CREAT/UREA NIT SERPL: 28
EST. AVERAGE GLUCOSE BLD GHB EST-MCNC: 119.8 MG/DL
GFR SERPLBLD CREATININE-BSD FMLA CKD-EPI: >60 ML/MIN/1.73/M2
GLOBULIN SER-MCNC: 2.9 GM/DL (ref 2.4–3.5)
GLUCOSE SERPL-MCNC: 104 MG/DL (ref 82–115)
GLUCOSE UR QL STRIP: NEGATIVE
HBA1C MFR BLD: 5.8 %
HDLC SERPL-MCNC: 47 MG/DL (ref 35–60)
HGB UR QL STRIP: ABNORMAL
KETONES UR QL STRIP: NEGATIVE
LDLC SERPL CALC-MCNC: 85 MG/DL (ref 50–140)
LEUKOCYTE ESTERASE UR QL STRIP: NEGATIVE
NITRITE UR QL STRIP: NEGATIVE
PH UR STRIP: 5.5 [PH]
POTASSIUM SERPL-SCNC: 4.3 MMOL/L (ref 3.5–5.1)
PROT SERPL-MCNC: 6.9 GM/DL (ref 5.8–7.6)
PROT UR QL STRIP: NEGATIVE
RBC #/AREA URNS AUTO: NORMAL /HPF
SODIUM SERPL-SCNC: 143 MMOL/L (ref 136–145)
SP GR UR STRIP.AUTO: 1.02 (ref 1–1.03)
SQUAMOUS #/AREA URNS AUTO: NORMAL /HPF
TRIGL SERPL-MCNC: 83 MG/DL (ref 37–140)
TSH SERPL-ACNC: 2.67 UIU/ML (ref 0.35–4.94)
UROBILINOGEN UR STRIP-ACNC: 0.2
VLDLC SERPL CALC-MCNC: 17 MG/DL
WBC #/AREA URNS AUTO: NORMAL /HPF

## 2025-08-08 PROCEDURE — 80053 COMPREHEN METABOLIC PANEL: CPT

## 2025-08-08 PROCEDURE — 36415 COLL VENOUS BLD VENIPUNCTURE: CPT

## 2025-08-08 PROCEDURE — 81003 URINALYSIS AUTO W/O SCOPE: CPT

## 2025-08-08 PROCEDURE — 83036 HEMOGLOBIN GLYCOSYLATED A1C: CPT

## 2025-08-08 PROCEDURE — 84443 ASSAY THYROID STIM HORMONE: CPT

## 2025-08-08 PROCEDURE — 80061 LIPID PANEL: CPT

## 2025-08-12 ENCOUNTER — OFFICE VISIT (OUTPATIENT)
Dept: INTERNAL MEDICINE | Facility: CLINIC | Age: 65
End: 2025-08-12
Payer: MEDICARE

## 2025-08-12 VITALS
HEIGHT: 64 IN | RESPIRATION RATE: 16 BRPM | WEIGHT: 203 LBS | BODY MASS INDEX: 34.66 KG/M2 | TEMPERATURE: 97 F | HEART RATE: 78 BPM | OXYGEN SATURATION: 98 %

## 2025-08-12 DIAGNOSIS — E66.01 MORBID (SEVERE) OBESITY DUE TO EXCESS CALORIES: ICD-10-CM

## 2025-08-12 DIAGNOSIS — Z17.0 MALIGNANT NEOPLASM OF LEFT BREAST IN FEMALE, ESTROGEN RECEPTOR POSITIVE, UNSPECIFIED SITE OF BREAST: ICD-10-CM

## 2025-08-12 DIAGNOSIS — Z78.0 POSTMENOPAUSAL STATE: ICD-10-CM

## 2025-08-12 DIAGNOSIS — N95.1 MENOPAUSAL SYMPTOM: ICD-10-CM

## 2025-08-12 DIAGNOSIS — F32.9 CURRENT EPISODE OF MAJOR DEPRESSIVE DISORDER WITHOUT PRIOR EPISODE, UNSPECIFIED DEPRESSION EPISODE SEVERITY: ICD-10-CM

## 2025-08-12 DIAGNOSIS — H93.19 TINNITUS, UNSPECIFIED LATERALITY: ICD-10-CM

## 2025-08-12 DIAGNOSIS — E78.5 HYPERLIPIDEMIA, UNSPECIFIED HYPERLIPIDEMIA TYPE: ICD-10-CM

## 2025-08-12 DIAGNOSIS — E88.819 INSULIN RESISTANCE: ICD-10-CM

## 2025-08-12 DIAGNOSIS — E03.9 HYPOTHYROIDISM, UNSPECIFIED TYPE: Primary | ICD-10-CM

## 2025-08-12 DIAGNOSIS — T88.7XXA MEDICATION SIDE EFFECT: ICD-10-CM

## 2025-08-12 DIAGNOSIS — D72.819 LEUKOPENIA, UNSPECIFIED TYPE: ICD-10-CM

## 2025-08-12 DIAGNOSIS — C50.912 MALIGNANT NEOPLASM OF LEFT BREAST IN FEMALE, ESTROGEN RECEPTOR POSITIVE, UNSPECIFIED SITE OF BREAST: ICD-10-CM

## 2025-08-12 PROBLEM — Z00.00 MEDICARE ANNUAL WELLNESS VISIT, SUBSEQUENT: Status: RESOLVED | Noted: 2025-02-12 | Resolved: 2025-08-12

## 2025-08-12 PROBLEM — Z01.818 PREOP EXAMINATION: Status: RESOLVED | Noted: 2025-04-15 | Resolved: 2025-08-12

## 2025-08-12 PROBLEM — M85.80 OSTEOPENIA: Status: RESOLVED | Noted: 2022-07-15 | Resolved: 2025-08-12

## 2025-08-12 PROBLEM — M17.11 OSTEOARTHRITIS OF RIGHT KNEE: Status: RESOLVED | Noted: 2022-08-23 | Resolved: 2025-08-12

## 2025-08-12 RX ORDER — CIPROFLOXACIN AND DEXAMETHASONE 3; 1 MG/ML; MG/ML
4 SUSPENSION/ DROPS AURICULAR (OTIC) 2 TIMES DAILY
Qty: 7.5 ML | Refills: 0 | Status: SHIPPED | OUTPATIENT
Start: 2025-08-12 | End: 2025-08-17

## 2025-08-12 RX ORDER — BUPROPION HYDROCHLORIDE 150 MG/1
150 TABLET ORAL DAILY
Qty: 30 TABLET | Refills: 11 | Status: SHIPPED | OUTPATIENT
Start: 2025-08-12 | End: 2026-08-12

## 2025-08-18 ENCOUNTER — LAB VISIT (OUTPATIENT)
Dept: LAB | Facility: HOSPITAL | Age: 65
End: 2025-08-18
Attending: INTERNAL MEDICINE
Payer: MEDICARE

## 2025-08-18 DIAGNOSIS — Z86.19 FREQUENT INFECTIONS: ICD-10-CM

## 2025-08-18 DIAGNOSIS — E53.8 LOW SERUM VITAMIN B12: ICD-10-CM

## 2025-08-18 DIAGNOSIS — D72.819 LEUKOPENIA, UNSPECIFIED TYPE: ICD-10-CM

## 2025-08-18 DIAGNOSIS — D70.4 CYCLICAL NEUTROPENIA: ICD-10-CM

## 2025-08-18 LAB
BASOPHILS # BLD AUTO: 0.02 X10(3)/MCL
BASOPHILS NFR BLD AUTO: 0.5 %
EOSINOPHIL # BLD AUTO: 0.09 X10(3)/MCL (ref 0–0.9)
EOSINOPHIL NFR BLD AUTO: 2.2 %
ERYTHROCYTE [DISTWIDTH] IN BLOOD BY AUTOMATED COUNT: 13.2 % (ref 11.5–17)
HCT VFR BLD AUTO: 41.1 % (ref 37–47)
HGB BLD-MCNC: 13.6 G/DL (ref 12–16)
IGA SERPL-MCNC: 190 MG/DL (ref 69–517)
IGG SERPL-MCNC: 816 MG/DL (ref 522–1631)
IGM SERPL-MCNC: 100 MG/DL (ref 33–293)
IMM GRANULOCYTES # BLD AUTO: 0.01 X10(3)/MCL (ref 0–0.04)
IMM GRANULOCYTES NFR BLD AUTO: 0.2 %
LYMPHOCYTES # BLD AUTO: 1.17 X10(3)/MCL (ref 0.6–4.6)
LYMPHOCYTES NFR BLD AUTO: 28.9 %
MCH RBC QN AUTO: 32.9 PG (ref 27–31)
MCHC RBC AUTO-ENTMCNC: 33.1 G/DL (ref 33–36)
MCV RBC AUTO: 99.3 FL (ref 80–94)
MONOCYTES # BLD AUTO: 0.39 X10(3)/MCL (ref 0.1–1.3)
MONOCYTES NFR BLD AUTO: 9.6 %
NEUTROPHILS # BLD AUTO: 2.37 X10(3)/MCL (ref 2.1–9.2)
NEUTROPHILS NFR BLD AUTO: 58.6 %
NRBC BLD AUTO-RTO: 0 %
PLATELET # BLD AUTO: 144 X10(3)/MCL (ref 130–400)
PMV BLD AUTO: 10 FL (ref 7.4–10.4)
RBC # BLD AUTO: 4.14 X10(6)/MCL (ref 4.2–5.4)
VIT B12 SERPL-MCNC: 354 PG/ML (ref 213–816)
WBC # BLD AUTO: 4.05 X10(3)/MCL (ref 4.5–11.5)

## 2025-08-18 PROCEDURE — 82607 VITAMIN B-12: CPT

## 2025-08-18 PROCEDURE — 82784 ASSAY IGA/IGD/IGG/IGM EACH: CPT

## 2025-08-18 PROCEDURE — 85025 COMPLETE CBC W/AUTO DIFF WBC: CPT

## 2025-08-18 PROCEDURE — 36415 COLL VENOUS BLD VENIPUNCTURE: CPT

## 2025-08-19 ENCOUNTER — OFFICE VISIT (OUTPATIENT)
Dept: HEMATOLOGY/ONCOLOGY | Facility: CLINIC | Age: 65
End: 2025-08-19
Payer: MEDICARE

## 2025-08-19 VITALS
DIASTOLIC BLOOD PRESSURE: 83 MMHG | OXYGEN SATURATION: 96 % | HEART RATE: 75 BPM | BODY MASS INDEX: 35.34 KG/M2 | RESPIRATION RATE: 18 BRPM | HEIGHT: 64 IN | SYSTOLIC BLOOD PRESSURE: 137 MMHG | TEMPERATURE: 98 F | WEIGHT: 207 LBS

## 2025-08-19 DIAGNOSIS — Z79.811 LONG TERM (CURRENT) USE OF AROMATASE INHIBITORS: ICD-10-CM

## 2025-08-19 DIAGNOSIS — C50.912 MALIGNANT NEOPLASM OF LEFT BREAST IN FEMALE, ESTROGEN RECEPTOR POSITIVE, UNSPECIFIED SITE OF BREAST: Primary | ICD-10-CM

## 2025-08-19 DIAGNOSIS — D70.4 CYCLICAL NEUTROPENIA: ICD-10-CM

## 2025-08-19 DIAGNOSIS — M85.80 OSTEOPENIA, UNSPECIFIED LOCATION: ICD-10-CM

## 2025-08-19 DIAGNOSIS — Z17.0 MALIGNANT NEOPLASM OF LEFT BREAST IN FEMALE, ESTROGEN RECEPTOR POSITIVE, UNSPECIFIED SITE OF BREAST: Primary | ICD-10-CM

## 2025-08-19 PROCEDURE — 99214 OFFICE O/P EST MOD 30 MIN: CPT | Mod: PBBFAC | Performed by: NURSE PRACTITIONER

## 2025-08-19 PROCEDURE — 99999 PR PBB SHADOW E&M-EST. PATIENT-LVL IV: CPT | Mod: PBBFAC,,, | Performed by: NURSE PRACTITIONER
